# Patient Record
Sex: MALE | Race: WHITE | NOT HISPANIC OR LATINO | Employment: OTHER | URBAN - METROPOLITAN AREA
[De-identification: names, ages, dates, MRNs, and addresses within clinical notes are randomized per-mention and may not be internally consistent; named-entity substitution may affect disease eponyms.]

---

## 2017-12-19 ENCOUNTER — OFFICE VISIT (OUTPATIENT)
Dept: SURGERY | Facility: CLINIC | Age: 57
End: 2017-12-19
Payer: MEDICARE

## 2017-12-19 VITALS
HEIGHT: 67 IN | DIASTOLIC BLOOD PRESSURE: 89 MMHG | BODY MASS INDEX: 36.09 KG/M2 | SYSTOLIC BLOOD PRESSURE: 137 MMHG | WEIGHT: 229.94 LBS

## 2017-12-19 DIAGNOSIS — K40.90 LEFT INGUINAL HERNIA: ICD-10-CM

## 2017-12-19 DIAGNOSIS — K43.2 INCISIONAL HERNIA, WITHOUT OBSTRUCTION OR GANGRENE: Primary | ICD-10-CM

## 2017-12-19 PROCEDURE — 99203 OFFICE O/P NEW LOW 30 MIN: CPT | Mod: ,,, | Performed by: SURGERY

## 2017-12-19 RX ORDER — HYDROCHLOROTHIAZIDE 25 MG/1
TABLET ORAL DAILY
COMMUNITY
Start: 2017-09-12 | End: 2020-01-27 | Stop reason: SDUPTHER

## 2017-12-19 RX ORDER — TAMSULOSIN HYDROCHLORIDE 0.4 MG/1
CAPSULE ORAL
COMMUNITY
Start: 2017-10-06 | End: 2018-07-06 | Stop reason: SDUPTHER

## 2017-12-19 RX ORDER — CYCLOBENZAPRINE HCL 5 MG
5 TABLET ORAL 3 TIMES DAILY PRN
COMMUNITY
Start: 2017-09-12 | End: 2020-09-17 | Stop reason: SDUPTHER

## 2017-12-19 RX ORDER — FLUTICASONE PROPIONATE 50 MCG
1 SPRAY, SUSPENSION (ML) NASAL
COMMUNITY
Start: 2017-09-12 | End: 2021-02-15 | Stop reason: SDUPTHER

## 2017-12-19 RX ORDER — AMLODIPINE BESYLATE 10 MG/1
10 TABLET ORAL DAILY
COMMUNITY
Start: 2017-11-17 | End: 2020-01-30 | Stop reason: SDUPTHER

## 2017-12-19 RX ORDER — FINASTERIDE 5 MG/1
TABLET, FILM COATED ORAL
COMMUNITY
Start: 2017-12-06 | End: 2018-09-11

## 2017-12-19 RX ORDER — ALPRAZOLAM 1 MG/1
TABLET ORAL DAILY
COMMUNITY
Start: 2017-12-15 | End: 2019-11-15 | Stop reason: SDUPTHER

## 2017-12-19 NOTE — LETTER
December 21, 2017      Jonathan Chacon MD  [address]  [address]  [facility] [address]           [facility]  [address]  [address]  [facility] [address]  Phone: [phone]  Fax: [phone]          Patient: Patrick Wilson   MR Number: [id number]   YOB: 1960   Date of Visit: 12/19/2017       Dear Dr. Jonathan Chacon:    Thank you for referring Patrick Wilson to me for evaluation. Attached you will find relevant portions of my assessment and plan of care.    If you have questions, please do not hesitate to call me. I look forward to following Patrick Wilson along with you.    Sincerely,    Vladimir Multani III, MD    Enclosure  CC:  No Recipients    If you would like to receive this communication electronically, please contact [email or web address] or [phone] to request more information on [employer] Link access.    For providers and/or their staff who would like to refer a patient to [facility], please contact us through our one-stop-shop provider referral line, [facility], at [phone].    If you feel you have received this communication in error or would no longer like to receive these types of communications, please e-mail [email or web address]

## 2017-12-19 NOTE — PROGRESS NOTES
Subjective:       Patient ID: Patrick Wilson is a 57 y.o. male.    Chief Complaint: Other (Referred by Dr Chacon to Evaluate Bilateral Hernia)      HPI:  Patient presents to the office with recurrent incisional hernia and also left inguinal. He has had multiple back surgeries through what appears to be an anterior retroperitoneal approach.  All the surgeries performed at outside facility and records are not available. The incision is in the left lateral abdomen. He states he had hernia repaired at the incision several years ago. He noticed a pop with discomfort there several weeks ago. He now has a bulge in that area. During exam with primary care physician also found to have a left inguinal hernia. He has no obstructive symptoms.      Past Medical History:   Diagnosis Date    Anticoagulant long-term use     Anxiety     Arthritis     Cerebral palsy     Depression     GERD (gastroesophageal reflux disease)     Hypertension      Past Surgical History:   Procedure Laterality Date    BACK SURGERY      LEG SURGERY Left     SHOULDER SURGERY Right      Review of patient's allergies indicates:   Allergen Reactions    Tylox [oxycodone-acetaminophen] Nausea And Vomiting     Medication List with Changes/Refills   Current Medications    ALPRAZOLAM (XANAX) 1 MG TABLET        AMLODIPINE (NORVASC) 10 MG TABLET        ASPIRIN (ECOTRIN) 81 MG EC TABLET    Take 81 mg by mouth once daily.    ATORVASTATIN (LIPITOR) 20 MG TABLET    Take 20 mg by mouth once daily.    CYCLOBENZAPRINE (FLEXERIL) 5 MG TABLET        DULOXETINE (CYMBALTA) 60 MG CAPSULE    Take 60 mg by mouth once daily.    FINASTERIDE (PROSCAR) 5 MG TABLET        FLUTICASONE (FLONASE) 50 MCG/ACTUATION NASAL SPRAY        GABAPENTIN (NEURONTIN) 300 MG CAPSULE    Take 900 mg by mouth 3 (three) times daily.    HYDROCHLOROTHIAZIDE (HYDRODIURIL) 25 MG TABLET        HYDROCODONE-ACETAMINOPHEN 10-325MG (NORCO)  MG TAB    Take by mouth.    PANTOPRAZOLE  (PROTONIX) 40 MG TABLET    Take 40 mg by mouth once daily.    TAMSULOSIN (FLOMAX) 0.4 MG CP24       Discontinued Medications    ALPRAZOLAM (XANAX) 2 MG TAB    Take by mouth nightly as needed.     History reviewed. No pertinent family history.  Social History     Social History    Marital status:      Spouse name: N/A    Number of children: N/A    Years of education: N/A     Social History Main Topics    Smoking status: Current Some Day Smoker     Types: Cigars    Smokeless tobacco: None    Alcohol use Yes      Comment: occasional    Drug use: No    Sexual activity: Not Asked     Other Topics Concern    None     Social History Narrative    None         Review of Systems   Constitutional: Negative for appetite change, chills, fever and unexpected weight change.   HENT: Negative for hearing loss, rhinorrhea, sore throat and voice change.    Eyes: Negative for photophobia and visual disturbance.   Respiratory: Negative for cough, choking and shortness of breath.    Cardiovascular: Negative for chest pain, palpitations and leg swelling.   Gastrointestinal: Positive for abdominal pain and diarrhea. Negative for blood in stool, constipation, nausea and vomiting.   Endocrine: Negative for cold intolerance, heat intolerance and polyphagia.   Genitourinary: Negative for dysuria.   Musculoskeletal: Positive for back pain. Negative for arthralgias.   Skin: Negative for color change.   Neurological: Negative for dizziness, seizures, syncope and headaches.   Hematological: Negative for adenopathy. Does not bruise/bleed easily.       Objective:      Physical Exam   Constitutional: He is oriented to person, place, and time. He appears well-developed and well-nourished.  Non-toxic appearance. No distress.   HENT:   Head: Normocephalic and atraumatic. Head is without abrasion and without laceration.   Right Ear: External ear normal.   Left Ear: External ear normal.   Nose: Nose normal.   Mouth/Throat: Oropharynx is  clear and moist.   Eyes: EOM are normal. Pupils are equal, round, and reactive to light.   Neck: Trachea normal. Neck supple. No tracheal deviation and normal range of motion present.   Cardiovascular: Normal rate and regular rhythm.    Pulmonary/Chest: Effort normal. No accessory muscle usage. No tachypnea. No respiratory distress.   Abdominal: Soft. Normal appearance and bowel sounds are normal. He exhibits no distension and no mass. There is no hepatosplenomegaly. There is no tenderness. A hernia is present. Hernia confirmed positive in the ventral area and confirmed positive in the left inguinal area.       He has a long incision in the left lateral abdomen that appears to be a retroperitoneal approach incision. He has bulge with hernia coming through the incision. It is reducible.    Also has a reducible left inguinal hernia.     Lymphadenopathy:        Right: No inguinal adenopathy present.        Left: No inguinal adenopathy present.   Neurological: He is alert and oriented to person, place, and time. Coordination and gait normal.   Skin: Skin is warm and intact.   Psychiatric: He has a normal mood and affect. His speech is normal and behavior is normal.       Assessment/Plan:   Patrick was seen today for other.    Diagnoses and all orders for this visit:    Incisional hernia, without obstruction or gangrene  -     CT Abdomen Pelvis With Contrast; Future    Left inguinal hernia            Has a complicated recurrent incisional hernia through a retroperitoneal left lateral incision. Also has a left inguinal hernia. We'll order a CT scan to review the extent of the hernia. He will return to the office after CT scan to discuss possible surgical options.

## 2018-03-06 ENCOUNTER — OFFICE VISIT (OUTPATIENT)
Dept: SURGERY | Facility: CLINIC | Age: 58
End: 2018-03-06
Payer: MEDICARE

## 2018-03-06 VITALS
SYSTOLIC BLOOD PRESSURE: 137 MMHG | WEIGHT: 229 LBS | BODY MASS INDEX: 35.94 KG/M2 | DIASTOLIC BLOOD PRESSURE: 89 MMHG | HEIGHT: 67 IN

## 2018-03-06 DIAGNOSIS — K40.90 LEFT INGUINAL HERNIA: Primary | ICD-10-CM

## 2018-03-06 PROCEDURE — 99213 OFFICE O/P EST LOW 20 MIN: CPT | Mod: ,,, | Performed by: SURGERY

## 2018-03-06 NOTE — PROGRESS NOTES
Subjective:       Patient ID: Patrick Wilson is a 57 y.o. male.    Chief Complaint: Other (ReEval Incisional Hernia & Left Inguinal Hernia)      HPI:  Patient returns to the office after his CT scan to evaluate his abdominal wall.  CT showed no evidence of ventral incisional hernia recurrence. It did show the moderate size left inguinal hernia.  The left abdominal wall appeared more lax and was asymmetric but hernia was not identified.  He has no new complaints.  He was originally seen two months ago with concerns about a recurrent incisional hernia and also left inguinal. He has had multiple back surgeries through what appears to be an anterior retroperitoneal approach.  All the surgeries performed at outside facility and records are not available. The incision is in the left lateral abdomen. He states he had hernia repaired at the incision several years ago. He noticed a pop with discomfort there. He noticed that he has a bigger bulge in that area. During exam with primary care physician also found to have a left inguinal hernia. He has no obstructive symptoms.      Past Medical History:   Diagnosis Date    Anticoagulant long-term use     Anxiety     Arthritis     Cerebral palsy     Depression     GERD (gastroesophageal reflux disease)     Hypertension      Past Surgical History:   Procedure Laterality Date    BACK SURGERY      LEG SURGERY Left     SHOULDER SURGERY Right      Review of patient's allergies indicates:   Allergen Reactions    Tylox [oxycodone-acetaminophen] Nausea And Vomiting     Medication List with Changes/Refills   Current Medications    ALPRAZOLAM (XANAX) 1 MG TABLET        AMLODIPINE (NORVASC) 10 MG TABLET        ASPIRIN (ECOTRIN) 81 MG EC TABLET    Take 81 mg by mouth once daily.    ATORVASTATIN (LIPITOR) 20 MG TABLET    Take 20 mg by mouth once daily.    CYCLOBENZAPRINE (FLEXERIL) 5 MG TABLET        DULOXETINE (CYMBALTA) 60 MG CAPSULE    Take 60 mg by mouth once daily.     FINASTERIDE (PROSCAR) 5 MG TABLET        FLUTICASONE (FLONASE) 50 MCG/ACTUATION NASAL SPRAY        GABAPENTIN (NEURONTIN) 300 MG CAPSULE    Take 900 mg by mouth 3 (three) times daily.    HYDROCHLOROTHIAZIDE (HYDRODIURIL) 25 MG TABLET        HYDROCODONE-ACETAMINOPHEN 10-325MG (NORCO)  MG TAB    Take by mouth.    PANTOPRAZOLE (PROTONIX) 40 MG TABLET    Take 40 mg by mouth once daily.    TAMSULOSIN (FLOMAX) 0.4 MG CP24         History reviewed. No pertinent family history.  Social History     Social History    Marital status:      Spouse name: N/A    Number of children: N/A    Years of education: N/A     Social History Main Topics    Smoking status: Current Some Day Smoker     Types: Cigars    Smokeless tobacco: Never Used    Alcohol use Yes      Comment: occasional    Drug use: No    Sexual activity: Not Asked     Other Topics Concern    None     Social History Narrative    None         Review of Systems   Constitutional: Negative for appetite change, chills, fever and unexpected weight change.   HENT: Negative for hearing loss, rhinorrhea, sore throat and voice change.    Eyes: Negative for photophobia and visual disturbance.   Respiratory: Negative for cough, choking and shortness of breath.    Cardiovascular: Negative for chest pain, palpitations and leg swelling.   Gastrointestinal: Positive for abdominal pain and diarrhea. Negative for blood in stool, constipation, nausea and vomiting.   Endocrine: Negative for cold intolerance, heat intolerance and polyphagia.   Genitourinary: Negative for dysuria.   Musculoskeletal: Positive for back pain. Negative for arthralgias.   Skin: Negative for color change.   Neurological: Negative for dizziness, seizures, syncope and headaches.   Hematological: Negative for adenopathy. Does not bruise/bleed easily.       Objective:      Physical Exam   Constitutional: He is oriented to person, place, and time. He appears well-developed and well-nourished.   Non-toxic appearance. No distress.   HENT:   Head: Normocephalic and atraumatic. Head is without abrasion and without laceration.   Right Ear: External ear normal.   Left Ear: External ear normal.   Nose: Nose normal.   Mouth/Throat: Oropharynx is clear and moist.   Eyes: EOM are normal. Pupils are equal, round, and reactive to light.   Neck: Trachea normal. Neck supple. No tracheal deviation and normal range of motion present.   Cardiovascular: Normal rate and regular rhythm.    Pulmonary/Chest: Effort normal. No accessory muscle usage. No tachypnea. No respiratory distress.   Abdominal: Soft. Normal appearance and bowel sounds are normal. He exhibits no distension and no mass. There is no hepatosplenomegaly. There is no tenderness. A hernia ( ) is present. Hernia confirmed positive in the left inguinal area. Hernia confirmed negative in the ventral area.       He has a long incision in the left lateral abdomen that appears to be a retroperitoneal approach incision.  His abdomen is bulging on the left.  No hernia     reducible left inguinal hernia appreciated.       Lymphadenopathy:        Right: No inguinal adenopathy present.        Left: No inguinal adenopathy present.   Neurological: He is alert and oriented to person, place, and time. Coordination and gait normal.   Skin: Skin is warm and intact.   Psychiatric: He has a normal mood and affect. His speech is normal and behavior is normal.       Assessment/Plan:   Left inguinal hernia  -     Ambulatory Referral to External Surgery  -     CBC Without Differential; Future; Expected date: 03/06/2018  -     Basic metabolic panel; Future; Expected date: 03/06/2018  -     X-Ray Chest PA And Lateral  -     SCHEDULED EKG 12-LEAD (to Muse); Future    No evidence of recurrent incisional hernia.  He would liek the inguinal hernia repaired.  He will be scheduled for repair in one month.     Planned procedure: left inguinal hernia repair.      Ancef 2 gm IV on call to  OR    NPO past midnight    Stephen cloth scrub per protocol    SCD's Bilateral Lower Extremities    I discussed the proposed procedures the patient including risks, benefits, indications, alternatives and special concerns.  The patient appears to understand and agrees to go ahead with surgery.  I have made no promises, warranties or verbal agreements beyond what was discussed above.

## 2018-06-25 ENCOUNTER — OFFICE VISIT (OUTPATIENT)
Dept: UROLOGY | Facility: CLINIC | Age: 58
End: 2018-06-25
Payer: MEDICARE

## 2018-06-25 VITALS
BODY MASS INDEX: 34.37 KG/M2 | RESPIRATION RATE: 18 BRPM | SYSTOLIC BLOOD PRESSURE: 154 MMHG | DIASTOLIC BLOOD PRESSURE: 97 MMHG | HEART RATE: 103 BPM | HEIGHT: 67 IN | WEIGHT: 219 LBS

## 2018-06-25 DIAGNOSIS — R39.15 URINARY URGENCY: ICD-10-CM

## 2018-06-25 DIAGNOSIS — N13.8 BPH WITH OBSTRUCTION/LOWER URINARY TRACT SYMPTOMS: Primary | ICD-10-CM

## 2018-06-25 DIAGNOSIS — Z86.39 HISTORY OF HYPOGONADISM: ICD-10-CM

## 2018-06-25 DIAGNOSIS — N40.1 BPH WITH OBSTRUCTION/LOWER URINARY TRACT SYMPTOMS: Primary | ICD-10-CM

## 2018-06-25 PROCEDURE — 99999 PR PBB SHADOW E&M-EST. PATIENT-LVL IV: CPT | Mod: PBBFAC,,, | Performed by: UROLOGY

## 2018-06-25 PROCEDURE — 99204 OFFICE O/P NEW MOD 45 MIN: CPT | Mod: S$GLB,,, | Performed by: UROLOGY

## 2018-06-25 PROCEDURE — 3008F BODY MASS INDEX DOCD: CPT | Mod: CPTII,S$GLB,, | Performed by: UROLOGY

## 2018-06-25 RX ORDER — LIDOCAINE HYDROCHLORIDE 20 MG/ML
JELLY TOPICAL ONCE
Status: CANCELLED | OUTPATIENT
Start: 2018-06-25 | End: 2018-06-25

## 2018-06-25 NOTE — LETTER
June 26, 2018      Yaya Nick PA-C  [address]  [address]  [facility] [address]           [place] [facility]  [address] [place] [address] [address]  [place] [address]  Phone: [phone]  Fax: [phone]          Patient: Patrick Wilson   MR Number: [id number]   YOB: 1960   Date of Visit: 6/25/2018       Dear Yaya Nick:    Thank you for referring Patrick Wilson to me for evaluation. Attached you will find relevant portions of my assessment and plan of care.    If you have questions, please do not hesitate to call me. I look forward to following Patrick Wilson along with you.    Sincerely,    Jonathan Jacinto MD    Enclosure  CC:  Jonathan Chacon MD    If you would like to receive this communication electronically, please contact [email or web address] or [phone] to request more information on [facility] Link access.    For providers and/or their staff who would like to refer a patient to [facility], please contact us through our one-stop-shop provider referral line, [facility], at [phone].    If you feel you have received this communication in error or would no longer like to receive these types of communications, please e-mail [email or web address]

## 2018-06-25 NOTE — PATIENT INSTRUCTIONS
Discussed conservative measures to control urgency and frequency including   1. avoiding bladder irritants (see below) - especially in evening!    2. timed voiding - empty on a schedule (approx ~2 hours)    3. dont postponing voiding - dont hold it on purpose     4. bowel regimen as distended bowel has extrinsic compressive effect on bladder.   - any or all of the following in any combination, titrate to soft daily bowel movement without pushing or straining  - colace/stool softener capsule - once to twice daily  - miralax - 1 capful daily to start, can increase to 2x daily (or decrease to 1/2 cap daily)  - increase dietary fibery   - fiber supplements, such as metamucil    Discussed bladder irritants include coffe (even decaf), tea, alcohol, soda, spicy foods, acidic juices (orange, tomato), vinegar, and artificial sweeteners/sugary beverages.    Discontinue fluid intake 2 hours before bed.    Can try taking 2 flomax at once (2 capsules, 0.8mg at one time) until returning for cystoscopy      Cystoscopy    Cystoscopy is a procedure that lets your doctor look directly inside your urethra and bladder. It can be used to:  · Help diagnose a problem with your urethra, bladder, or kidneys.  · Take a sample (biopsy) of bladder or urethral tissue.  · Treat certain problems (such as removing kidney stones).  · Place a stent to bypass an obstruction.  · Take special X-rays of the kidneys.  Based on the findings, your doctor may recommend other tests or treatments.  What is a cystoscope?  A cystoscope is a telescope-like instrument that contains lenses and fiberoptics (small glass wires that make bright light). The cystoscope may be straight and rigid, or flexible to bend around curves in the urethra. The doctor may look directly into the cystoscope, or project the image onto a monitor.  Getting ready  · Ask your doctor if you should stop taking any medicines before the procedure.  · Ask whether you should avoid eating or  drinking anything after midnight before the procedure.  · Follow any other instructions your doctor gives you.  Tell your doctor before the exam if you:  · Take any medicines, such as aspirin or blood thinners  · Have allergies to any medicines  · Are pregnant   The procedure  Cystoscopy is done in the doctors office, surgery center, or hospital. The doctor and a nurse are present during the procedure. It takes only a few minutes, longer if a biopsy, X-ray, or treatment needs to be done.  During the procedure:  · You lie on an exam table on your back, knees bent and legs apart. You are covered with a drape.  · Your urethra and the area around it are washed. Anesthetic jelly may be applied to numb the urethra. Other pain medicine is usually not needed. In some cases, you may be offered a mild sedative to help you relax. If a more extensive procedure is to be done, such as a biopsy or kidney stone removal, general anesthesia may be needed.  · The cystoscope is inserted. A sterile fluid is put into the bladder to expand it. You may feel pressure from this fluid.  · When the procedure is done, the cystoscope is removed.  After the procedure  If you had a sedative, general anesthesia, or spinal anesthesia, you must have someone drive you home. Once youre home:  · Drink plenty of fluids.  · You may have burning or light bleeding when you urinate--this is normal.  · Medicines may be prescribed to ease any discomfort or prevent infection. Take these as directed.  · Call your doctor if you have heavy bleeding or blood clots, burning that lasts more than a day, a fever over 100°F  (38° C), or trouble urinating.  Date Last Reviewed: 1/1/2017 © 2000-2017 [employer]. [address] [place] [address], [place] [address]. All rights reserved. This information is not intended as a substitute for professional medical care. Always follow your healthcare professional's instructions.

## 2018-07-08 RX ORDER — TAMSULOSIN HYDROCHLORIDE 0.4 MG/1
0.8 CAPSULE ORAL DAILY
Qty: 180 CAPSULE | Refills: 3 | Status: SHIPPED | OUTPATIENT
Start: 2018-07-08 | End: 2018-09-11

## 2018-07-16 ENCOUNTER — TELEPHONE (OUTPATIENT)
Dept: UROLOGY | Facility: CLINIC | Age: 58
End: 2018-07-16

## 2018-07-16 NOTE — TELEPHONE ENCOUNTER
----- Message from Ambreen Carrillo sent at 7/16/2018 10:06 AM CDT -----  Contact: self [phone]  He said he has not received instructions for his procedure tomorrow.  Please call him.  Thank you!

## 2018-07-17 ENCOUNTER — SURGERY (OUTPATIENT)
Age: 58
End: 2018-07-17

## 2018-07-17 ENCOUNTER — HOSPITAL ENCOUNTER (OUTPATIENT)
Facility: AMBULARY SURGERY CENTER | Age: 58
Discharge: HOME | End: 2018-07-17
Attending: UROLOGY | Admitting: UROLOGY
Payer: MEDICARE

## 2018-07-17 DIAGNOSIS — N13.8 BPH WITH OBSTRUCTION/LOWER URINARY TRACT SYMPTOMS: ICD-10-CM

## 2018-07-17 DIAGNOSIS — N40.1 BPH WITH OBSTRUCTION/LOWER URINARY TRACT SYMPTOMS: ICD-10-CM

## 2018-07-17 PROCEDURE — 76872 US TRANSRECTAL: CPT | Performed by: UROLOGY

## 2018-07-17 PROCEDURE — 76872 US TRANSRECTAL: CPT | Mod: 26,,, | Performed by: UROLOGY

## 2018-07-17 PROCEDURE — 52000 CYSTOURETHROSCOPY: CPT | Mod: 59,,, | Performed by: UROLOGY

## 2018-07-17 PROCEDURE — 52000 CYSTOURETHROSCOPY: CPT | Performed by: UROLOGY

## 2018-07-17 RX ORDER — CIPROFLOXACIN 500 MG/1
500 TABLET ORAL 2 TIMES DAILY
Qty: 6 TABLET | Refills: 0 | Status: SHIPPED | OUTPATIENT
Start: 2018-07-17 | End: 2018-07-23

## 2018-07-17 RX ORDER — LIDOCAINE HYDROCHLORIDE 20 MG/ML
JELLY TOPICAL
Status: DISCONTINUED
Start: 2018-07-17 | End: 2018-07-17 | Stop reason: HOSPADM

## 2018-07-17 RX ORDER — LIDOCAINE HYDROCHLORIDE 20 MG/ML
JELLY TOPICAL ONCE
Status: COMPLETED | OUTPATIENT
Start: 2018-07-17 | End: 2018-07-17

## 2018-07-17 RX ORDER — WATER 1000 ML/1000ML
INJECTION, SOLUTION INTRAVENOUS
Status: DISCONTINUED | OUTPATIENT
Start: 2018-07-17 | End: 2018-07-17 | Stop reason: HOSPADM

## 2018-07-17 RX ADMIN — WATER 500 ML: 1000 INJECTION, SOLUTION INTRAVENOUS at 02:07

## 2018-07-17 RX ADMIN — LIDOCAINE HYDROCHLORIDE 5 ML: 20 JELLY TOPICAL at 02:07

## 2018-07-17 NOTE — INTERVAL H&P NOTE
The patient has been examined and the H&P has been reviewed:    No changes to above proceed with trus.cysto. Pt is acceptable candidate for procedure at [facility]     Anesthesia/Surgery risks, benefits and alternative options discussed and understood by patient/family.          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

## 2018-07-17 NOTE — H&P (VIEW-ONLY)
[facility] Urology Consult:  Consult from: Yaya CASTILLO  Consult for: BPH    Mr. Patrick Wilson is a 58-year-old male referred by ANNA Nick/ Dr Chacon for evaluation of BPH with LUTS    He was last seen by primary care on 05/03/2018 and noted to be on Flomax 0.4 mg daily and finasteride 5 mg daily without adequate symptomatic control on dual medical therapy.  Noted to have worsening nocturia 4-5 times.  He requested new urologist.  He was previously seeing Dr. Swanson.  He was noted to be a chronically ill individual, as he has chronic pain syndrome and had been doing better lately and was noted to be the best he had been seen in quite some time.  Takes chronic hydrocodone, Xanax, Cymbalta.  Did have lumbar spine surgery in 1995, and 2005 by anterior approach.  He also has a history of hypogonadism and testosterone replacement.  This has been complicated by polycythemia and on 12/19/2017 H/H were noted to be 58.6/19.1.  He was advised to go to the blood Center at that time and donate 1 unit of blood and reduce his testosterone dose to 1 cc every 2 weeks instead of 2 cc.  A repeat H/H on 04/17/2018 was noted to be 52.1/17.  Total testosterone 241 on 02/28/2018.  Unclear about timing of last injection.  Recently had LIH pending repair with Dr Lux May 2018.    Record review from Dr. Swanson indicates that as of March 8, 2017 his PSA was 0.8, and had a documented testosterone of 406 (10/3/16).  He was noted to be on testosterone injection therapy at that time, and again timing of injections to labs unclear.  He was noted at that visit to have a 40 g prostate with no nodules.  He took Cialis 20 mg dose with significant improvement in his ED.  This was prescribed.  February 2016:  BPH on Flomax, PSA 0.8 January 2016.  Free testosterone low at 19.  On testosterone 1.5 cc every 2 weeks.  September 2015:  Poor results with monthly testosterone injection, changed to 1.5 cc every 2 weeks.  BPH, still on Flomax,  "60% improvement.  ED:  Still with decreased libido, uses 100 mg Viagra.  May 2015:  Testosterone injection teaching.  2 cc IM given.  Trial of Rapaflo for BPH with 50-70% improvement.  Prescribed Flomax.  Given a trial of Cialis for ED.    PSA:  04/10/2015 1.0, 01/06/2016 0.8 (25% free), 4/11/16 0.9, 3/6/17 0.8.  Creatinine 0.98. H/H 17/54.  TSH 1.44 normal  Total testosterone:  162 04/10/2015, 512 06/11/15, 09/22/2015 114 (19 free), 172 04/11/2016, 332 06/22/2016, 406 10/03/2016  Scrotal ultrasound 05/20/2015 with 4.4 cm right epididymal head cystic mass:  Epididymal cyst versus spermatocele, less likely loculated hydrocele.  4.4 x 2.6 x 4.2 cm with increased through transmission of sound.    He reports today noting that at 54 got very depressed and tired all the time and his T went down to 156.  Reports no point in taking his testosterone as wasn't helping so hasnt taken it in one year  Having urgency with occ drop UUI. Urgency x 3 years.  AUA SS: 26/6 (5: frequency, urgency; 4: emptying, nocturia; 3: intermittency, weak stream; 2: straining) - meds helped only 1 of 10.  Urgent with all nighttime voids, with nocturia 3-4x, is sometimes drinking up until bedtime  Water throughout day, 2 beers per day and a highball or mimosa in the evening. Doesn't get "loaded, just relaxed"  Minimal tea and soda. ++ spicy foods. ++ alcohol a few drinks per day  2 cups of coffee per AM  Night constant stream, during day has intermittency.  With bowel movement will urinate before, then after BM will need to urinate again with dribbling.  Had easy brusing and stomach ulcers from daypro which he has stopped since endoscopy/colonoscopy.  Urinated right before leaving the house. Couldn't void on arrival with PVR 147cc.  Feeling of being able to urinate comes and goes.   Can drink 4-6 beers and not need to urinate, but once goes, goes every 5 minutes  No hematuria, dysuria    Past Medical History:   Diagnosis Date    Anticoagulant " long-term use     Anxiety     Arthritis     Cerebral palsy     Depression     GERD (gastroesophageal reflux disease)     Hypertension        Past Surgical History:   Procedure Laterality Date    BACK SURGERY      LEG SURGERY Left     SHOULDER SURGERY Right        History reviewed. No pertinent family history.    Social History     Social History    Marital status:      Spouse name: N/A    Number of children: N/A    Years of education: N/A     Occupational History    Not on file.     Social History Main Topics    Smoking status: Current Some Day Smoker     Types: Cigars, Pipe    Smokeless tobacco: Never Used      Comment: occasional.    Alcohol use Yes      Comment: occasional    Drug use: No    Sexual activity: Not on file     Other Topics Concern    Not on file     Social History Narrative    No narrative on file       Review of patient's allergies indicates:   Allergen Reactions    Tylox [oxycodone-acetaminophen] Nausea And Vomiting       Medications Reviewed: see MAR    ROS:    Constitutional: denies fevers, chills, night sweats, fatigue, malaise  Respiratory: negative for cough, shortness of breath, wheezing, dyspnea.  Cardiovascular: + for high blood pressure, negative for chest pain, varicose veins, ankle swelling, palpitations, syncope.  GI: negative for abdominal pain, heartburn, indigestion, nausea, vomiting, constipation, diarrhea, blood in stool.   Urology: as noted above in HPI  Endocrinology: negative for cold intolerance, excessive thirst, not feeling tired/sluggish, no heat intolerance.   Hematology/Lymph: negative for easy bleeding, easy bruising, swollen glands.  Musculoskeletal: negative for back pain, joint pain, joint swelling, neck pain.  Allergy-Immunology: negative for seasonal allergies, negative for unusual infections.   Skin: negative for boils, breast lumps, hives, itching, rash.   Neurology: negative for, dizziness, headache, tingling/numbness, tremors.  "  Psych: satisfied with life; negative for, anxiety, depression, suicidal thoughts.     PHYSICAL EXAM:    Vitals:    06/25/18 0858   BP: (!) 154/97   Pulse: 103   Resp: 18     Body mass index is 34.3 kg/m². Weight: 99.3 kg (219 lb) Height: 5' 7" (170.2 cm)       General: Alert, cooperative, no distress, appears stated age  Head: Normocephalic, without obvious abnormality, atraumatic  Neck: no masses, no thyromegaly, no lymphadenopathy  Eyes: PERRL, conjunctiva/corneas clear  Lungs: Respirations unlabored, normal effort, no accessory muscle use  CV: Warm and well perfused extremities  Abdomen: Soft, non-tender, no CVA tenderness, no hepatosplenomegaly, 1+ pannus, LLQ scar, + LIH reducible  Back: surgical scar midline lumbar with central dimple within surgical scar  Penis: phallus normal, circumcised, well cared for, no plaques or lesions.   Scrotum: no cysts, no lesions, no rash, no hydrocele.   Epididymes: normal, nontender, symmetrical, large R epdidymal cyst  Testes: normal, both descended, no masses.   Urethra: palpably normal with orthotopic meatus of normal size    AMBER: normal sphincter tone, no masses, no hemmorrhoids   PROSTATE: 30g, no nodules, non-tender, symmetrical.   Extremities: Extremities normal, atraumatic, no cyanosis or edema  Skin: Normal color, texture, and turgor, no rashes or lesions  Psych: Appropriate, well oriented, normal affect, normal mood  Neuro: Non-focal      Assessment/Diagnosis:    1. BPH with obstruction/lower urinary tract symptoms  Case Request Operating Room: CYSTOSCOPY, ULTRASOUND, TRANSRECTAL    Place in Outpatient   2. Urinary urgency     3. History of hypogonadism         Plans:  Discussed conservative measures to control urgency and frequency including avoiding bladder irritants, timed voiding, not postponing voiding, and bowel regimen (as distended bowel has extrinsic compressive effect on bladder. Discussed bladder irritants include coffe (even decaf), tea, alcohol, soda, " spicy foods, acidic juices (orange, tomato), vinegar, and artificial sweeteners.  Since he has mildly enlarged prostate with significant obstructive symptoms, in addition to conservative measures as above urgency, we also discussed discontinuing fluid intake 2 hr before bed to help control nocturia.  As his symptoms are persistent despite alpha blockers, we did discuss formal lower urinary tract evaluation for obstruction to help guide further recommendations.  We discussed cystourethroscopy to evaluate for prostatic obstruction or any other lower urinary tract obstruction, with concurrent transrectal ultrasound guided accurate volumetric measurement of the prostate.  He would be interested in minimally invasive BPH therapies to help discontinue medication, though in the interim until evaluation he should continue Flomax at 0.8 mg dose.  Cystoscopy and trus 7/17/18   May need anticholinergic vs uds if no sig obstruction present  All questions patient had were answered.  He was agreeable to treatment plan.

## 2018-07-17 NOTE — DISCHARGE INSTRUCTIONS
Prostate Ultrasound    An ultrasound is a type of imaging test. It can be used to look at the prostate. Its a safe procedure that does not use radiation. It uses high-frequency sound waves.  When ultrasound is used  You may need ultrasound on your prostate if your health care provider thinks you may have prostate cancer. An ultrasound is most often done after a digital rectal exam (AMBER) or a PSA blood test. These are screening tests for prostate cancer.  How ultrasound helps  Ultrasound uses sound waves to create an image of the prostate gland. It helps your health care provider see if the gland looks abnormal. It can also be used to help guide a biopsy. This is a procedure that removes tiny pieces of tissue to test. A prostate biopsy is done with a needle. Ultrasound helps your health care provider see where to put the needle.  Before the procedure  You may be told to use an enema or suppository the night or morning before. This is to clear your rectum of stool. The test is often done in your health care provider's office. It usually takes less than 15 minutes. If a biopsy may be done, youll be given antibiotics before and after the test.  During the procedure  What happens during the procedure:  · You lie on your side with your knees bent or with your feet in stirrups.   · A disposable cover is put on the ultrasound probe. The probe is tube-shaped and a bit larger than a thumb. A jelly is put on the probe to lubricate it.  · Your health care provider gently inserts the probe into your rectum. This may cause some discomfort.  · The probe emits sound waves. These create an image of your prostate on a computer screen. Your health care provider looks at the image. The size and shape of your prostate are checked for problems.  · When the test is done, the probe is removed.  · You can go home the same day, and go back to your normal activities.  Date Last Reviewed: 3/24/2015  © 9330-1700 [employer]  [address] [place] [address]. All rights reserved. This information is not intended as a substitute for professional medical care. Always follow your healthcare professional's instructions.        Cystoscopy    Cystoscopy is a procedure that lets your doctor look directly inside your urethra and bladder. It can be used to:  · Help diagnose a problem with your urethra, bladder, or kidneys.  · Take a sample (biopsy) of bladder or urethral tissue.  · Treat certain problems (such as removing kidney stones).  · Place a stent to bypass an obstruction.  · Take special X-rays of the kidneys.  Based on the findings, your doctor may recommend other tests or treatments.  What is a cystoscope?  A cystoscope is a telescope-like instrument that contains lenses and fiberoptics (small glass wires that make bright light). The cystoscope may be straight and rigid, or flexible to bend around curves in the urethra. The doctor may look directly into the cystoscope, or project the image onto a monitor.  Getting ready  · Ask your doctor if you should stop taking any medicines before the procedure.  · Ask whether you should avoid eating or drinking anything after midnight before the procedure.  · Follow any other instructions your doctor gives you.  Tell your doctor before the exam if you:  · Take any medicines, such as aspirin or blood thinners  · Have allergies to any medicines  · Are pregnant   The procedure  Cystoscopy is done in the doctors office, surgery center, or hospital. The doctor and a nurse are present during the procedure. It takes only a few minutes, longer if a biopsy, X-ray, or treatment needs to be done.  During the procedure:  · You lie on an exam table on your back, knees bent and legs apart. You are covered with a drape.  · Your urethra and the area around it are washed. Anesthetic jelly may be applied to numb the urethra. Other pain medicine is usually not needed. In some cases, you may be offered a  mild sedative to help you relax. If a more extensive procedure is to be done, such as a biopsy or kidney stone removal, general anesthesia may be needed.  · The cystoscope is inserted. A sterile fluid is put into the bladder to expand it. You may feel pressure from this fluid.  · When the procedure is done, the cystoscope is removed.  After the procedure  If you had a sedative, general anesthesia, or spinal anesthesia, you must have someone drive you home. Once youre home:  · Drink plenty of fluids.  · You may have burning or light bleeding when you urinate--this is normal.  · Medicines may be prescribed to ease any discomfort or prevent infection. Take these as directed.  · Call your doctor if you have heavy bleeding or blood clots, burning that lasts more than a day, a fever over 100°F  (38° C), or trouble urinating.  Date Last Reviewed: 1/1/2017  © 5171-0940 [employer]. [address] [place] [address]. All rights reserved. This information is not intended as a substitute for professional medical care. Always follow your healthcare professional's instructions.               After the procedure    · Drink plenty of fluids.  · You may have burning or light bleeding when you urinate--this is normal.  · Medications may be prescribed to ease any discomfort or prevent infection. Take these as directed.  · Call your doctor if you have heavy bleeding or blood clots, burning that lasts more than a day, a fever over 100°F  (38° C), or trouble urinating.

## 2018-07-17 NOTE — OP NOTE
[facility] U[facility] Operative Report/Brief Discharge Note     Date: 7/17/18     Staff Surgeon: Jonathan Jacinto MD     Pre-Op Diagnosis:   BPH with LUTS and incomplete emptying.     Post-Op Diagnosis: same     Procedure(s) Performed:   1. Cystoscopy, flexible  2. Transrectal ultrasound measurement of prostate volume     Specimen(s): none     Anesthesia: Local: 2% xylocaine jelly per urethra (urojet)     Findings:   US: Prostate volume 28.2cm3 (W 45.2mm, H 26.5mm, L 45.1mm), no median lobe  Cysto: Bilateral lateral lobe ingrowth causing significant obstruction especially when viewed from verumontanum. No median lobe, but on retroflexion can see ingrowth from lateral lobes encroaching       Estimated Blood Loss: none     Drains: none     Complications: none      Indications for procedure: Mr Wilson is a 59 yo M with longstanding history of BPH/LUTS refractory to alpha blockers. AUA SS 26/5. PVR 147cc. Here for lower tract evaluation to discuss options further     Procedure in detail:  After informed consent, the patient was first placed in left lateral position.    US probe inserted into rectum and prostate visualized on screen. No ultrasound abnormalities of the prostate. Measurements taken as above with total volume of 28.2cc. No SV dilation or other ultrasonic abnormality.     Ultrasound probe removed and he was then placed in  supine position and prepped and drapped in standard cystoscopic fashion and 2% xylocaine jelly was instilled into the urethra. A flexible cystoscope was passed into the bladder via the urethra.      Anterior urethra appeared normal without any lesions or narrowings. The prostatic urethra demonstrated significant obstructing ingrowth of bilateral lateral lobes with visual obstruction, especially when viewed from verumontanum, as well as centrally.     Bladder otherwise systematically inspected and no mucosal lesions or tumors seen. He did have some grade 1 trabeculations. Bilateral ureteral  orifices seen in orthotopic position on trigone bilaterally with clear efflux. On retroflexion, no median lobe,  but can see ingrowth from lateral lobes encroaching         Patient tolerated the procedure well. No complications     Disposition:  We did discuss options for his obstructing prostate despite medical management.  As his prostate is < 40g, dual medical therapy not indicated, and he has failed alpha blocker even at 0.8mg dose of flomax. Given his desire to be free of medical management and have symptomatic relief, also discussed options for intervention such as Urolift vs TURP. After extensive discussion of both procedures and medical therapy, he elected to proceed with Urolift.      Procedure in detail discussed, including risks benefits and alternatives (such as TURP and continued medical management). Discussed risks including but not limited to hematuria, postop retention, pain, infection, injury to bladder or urethra, and worsening urgency, latent pelvic pain, no guarantee of symptomatic relief, prostate regrowth, need for future procedures. We did discuss the non-incidence of ejaculatory dysfunction, as well as the 1-2% retreatment rate in 5 year studies. Also discussed about 20% of people may need a catheter after (due to retention or hematuria) but not required if voiding postop. All questions answered, and appropriate informed consent obtained.      UroLift scheduled in OR for 8/2/18.  Will need PAT visit prior at least 2 week before for updated labs, Ucx.   In the interim, he will continue flomax        Discharge home today status post uncomplicated procedure as above  Diet - resume home diet  Follow up: 8/2/18 for UroLift  Instructions: drink plenty of water, may see blood in urine, take abx as directed  Meds:     Medication List      START taking these medications    ciprofloxacin HCl 500 MG tablet  Commonly known as:  CIPRO  Take 1 tablet (500 mg total) by mouth 2 (two) times daily.         CONTINUE taking these medications    ALPRAZolam 1 MG tablet  Commonly known as:  XANAX     amLODIPine 10 MG tablet  Commonly known as:  NORVASC     aspirin 81 MG EC tablet  Commonly known as:  ECOTRIN     atorvastatin 20 MG tablet  Commonly known as:  LIPITOR     cyclobenzaprine 5 MG tablet  Commonly known as:  FLEXERIL     DULoxetine 60 MG capsule  Commonly known as:  CYMBALTA     finasteride 5 mg tablet  Commonly known as:  PROSCAR     fluticasone 50 mcg/actuation nasal spray  Commonly known as:  FLONASE     gabapentin 300 MG capsule  Commonly known as:  NEURONTIN     hydroCHLOROthiazide 25 MG tablet  Commonly known as:  HYDRODIURIL     HYDROcodone-acetaminophen  mg per tablet  Commonly known as:  NORCO     pantoprazole 40 MG tablet  Commonly known as:  PROTONIX     tamsulosin 0.4 mg Cap  Commonly known as:  FLOMAX  Take 2 capsules (0.8 mg total) by mouth once daily.           Where to Get Your Medications      These medications were sent to [id number] - [place], [place] - [address] [address]    Phone:  [phone]   · ciprofloxacin HCl 500 MG tablet

## 2018-07-18 VITALS
WEIGHT: 218.94 LBS | HEART RATE: 81 BPM | HEIGHT: 67 IN | BODY MASS INDEX: 34.36 KG/M2 | SYSTOLIC BLOOD PRESSURE: 131 MMHG | OXYGEN SATURATION: 95 % | RESPIRATION RATE: 20 BRPM | TEMPERATURE: 99 F | DIASTOLIC BLOOD PRESSURE: 91 MMHG

## 2018-07-19 DIAGNOSIS — N13.8 BPH WITH URINARY OBSTRUCTION: Primary | ICD-10-CM

## 2018-07-19 DIAGNOSIS — N40.1 BPH WITH URINARY OBSTRUCTION: Primary | ICD-10-CM

## 2018-07-19 DIAGNOSIS — R58 BLEEDING: ICD-10-CM

## 2018-07-20 PROCEDURE — 93010 ELECTROCARDIOGRAM REPORT: CPT | Mod: ,,, | Performed by: INTERNAL MEDICINE

## 2018-07-20 PROCEDURE — 93005 ELECTROCARDIOGRAM TRACING: CPT

## 2018-07-23 ENCOUNTER — HOSPITAL ENCOUNTER (OUTPATIENT)
Dept: RADIOLOGY | Facility: HOSPITAL | Age: 58
Discharge: HOME | End: 2018-07-23
Attending: UROLOGY
Payer: MEDICARE

## 2018-07-23 ENCOUNTER — HOSPITAL ENCOUNTER (OUTPATIENT)
Dept: PREADMISSION TESTING | Facility: HOSPITAL | Age: 58
Discharge: HOME | End: 2018-07-23
Attending: UROLOGY
Payer: MEDICARE

## 2018-07-23 VITALS — BODY MASS INDEX: 34.37 KG/M2 | HEIGHT: 67 IN | WEIGHT: 219 LBS

## 2018-07-23 DIAGNOSIS — N13.8 BPH WITH URINARY OBSTRUCTION: ICD-10-CM

## 2018-07-23 DIAGNOSIS — N40.1 BPH WITH URINARY OBSTRUCTION: ICD-10-CM

## 2018-07-23 PROCEDURE — 71046 X-RAY EXAM CHEST 2 VIEWS: CPT | Mod: TC,FY

## 2018-07-23 PROCEDURE — 71046 X-RAY EXAM CHEST 2 VIEWS: CPT | Mod: 26,,, | Performed by: RADIOLOGY

## 2018-07-23 PROCEDURE — 99900103 DSU ONLY-NO CHARGE-INITIAL HR (STAT)

## 2018-07-23 PROCEDURE — 99900104 DSU ONLY-NO CHARGE-EA ADD'L HR (STAT)

## 2018-08-01 ENCOUNTER — ANESTHESIA EVENT (OUTPATIENT)
Dept: SURGERY | Facility: HOSPITAL | Age: 58
End: 2018-08-01
Payer: MEDICARE

## 2018-08-02 ENCOUNTER — HOSPITAL ENCOUNTER (OUTPATIENT)
Facility: HOSPITAL | Age: 58
Discharge: HOME | End: 2018-08-02
Attending: UROLOGY | Admitting: UROLOGY
Payer: MEDICARE

## 2018-08-02 ENCOUNTER — SURGERY (OUTPATIENT)
Age: 58
End: 2018-08-02

## 2018-08-02 ENCOUNTER — ANESTHESIA (OUTPATIENT)
Dept: SURGERY | Facility: HOSPITAL | Age: 58
End: 2018-08-02
Payer: MEDICARE

## 2018-08-02 VITALS
TEMPERATURE: 98 F | HEART RATE: 86 BPM | OXYGEN SATURATION: 96 % | HEIGHT: 67 IN | WEIGHT: 233 LBS | BODY MASS INDEX: 36.57 KG/M2 | RESPIRATION RATE: 18 BRPM | DIASTOLIC BLOOD PRESSURE: 88 MMHG | SYSTOLIC BLOOD PRESSURE: 128 MMHG

## 2018-08-02 DIAGNOSIS — N40.1 BPH WITH URINARY OBSTRUCTION: ICD-10-CM

## 2018-08-02 DIAGNOSIS — N13.8 BPH WITH URINARY OBSTRUCTION: ICD-10-CM

## 2018-08-02 PROCEDURE — D9220A PRA ANESTHESIA: Mod: CRNA,,, | Performed by: NURSE ANESTHETIST, CERTIFIED REGISTERED

## 2018-08-02 PROCEDURE — 99900104 DSU ONLY-NO CHARGE-EA ADD'L HR (STAT): Performed by: UROLOGY

## 2018-08-02 PROCEDURE — 52442 CYSTO INS TRNSPRSTC IMPLT EA: CPT | Mod: ,,, | Performed by: UROLOGY

## 2018-08-02 PROCEDURE — 36000706: Performed by: UROLOGY

## 2018-08-02 PROCEDURE — 25000003 PHARM REV CODE 250: Performed by: UROLOGY

## 2018-08-02 PROCEDURE — 52441 CYSTO INSJ TRNSPRSTC 1 IMPLT: CPT | Mod: ,,, | Performed by: UROLOGY

## 2018-08-02 PROCEDURE — 71000033 HC RECOVERY, INTIAL HOUR: Performed by: UROLOGY

## 2018-08-02 PROCEDURE — D9220A PRA ANESTHESIA: Mod: ANES,,, | Performed by: ANESTHESIOLOGY

## 2018-08-02 PROCEDURE — 63600175 PHARM REV CODE 636 W HCPCS: Performed by: ANESTHESIOLOGY

## 2018-08-02 PROCEDURE — 71000039 HC RECOVERY, EACH ADD'L HOUR: Performed by: UROLOGY

## 2018-08-02 PROCEDURE — L8699 PROSTHETIC IMPLANT NOS: HCPCS | Performed by: UROLOGY

## 2018-08-02 PROCEDURE — 27200651 HC AIRWAY, LMA: Performed by: NURSE ANESTHETIST, CERTIFIED REGISTERED

## 2018-08-02 PROCEDURE — 63600175 PHARM REV CODE 636 W HCPCS: Performed by: NURSE ANESTHETIST, CERTIFIED REGISTERED

## 2018-08-02 PROCEDURE — 63600175 PHARM REV CODE 636 W HCPCS: Performed by: UROLOGY

## 2018-08-02 PROCEDURE — 25000003 PHARM REV CODE 250: Performed by: ANESTHESIOLOGY

## 2018-08-02 PROCEDURE — 71000015 HC POSTOP RECOV 1ST HR: Performed by: UROLOGY

## 2018-08-02 PROCEDURE — 99900103 DSU ONLY-NO CHARGE-INITIAL HR (STAT): Performed by: UROLOGY

## 2018-08-02 PROCEDURE — 37000008 HC ANESTHESIA 1ST 15 MINUTES: Performed by: UROLOGY

## 2018-08-02 PROCEDURE — 37000009 HC ANESTHESIA EA ADD 15 MINS: Performed by: UROLOGY

## 2018-08-02 PROCEDURE — 36000707: Performed by: UROLOGY

## 2018-08-02 DEVICE — SYS UROLIFT: Type: IMPLANTABLE DEVICE | Site: PROSTATE | Status: FUNCTIONAL

## 2018-08-02 RX ORDER — SODIUM CHLORIDE, SODIUM LACTATE, POTASSIUM CHLORIDE, CALCIUM CHLORIDE 600; 310; 30; 20 MG/100ML; MG/100ML; MG/100ML; MG/100ML
INJECTION, SOLUTION INTRAVENOUS CONTINUOUS
Status: DISCONTINUED | OUTPATIENT
Start: 2018-08-02 | End: 2018-08-02 | Stop reason: HOSPADM

## 2018-08-02 RX ORDER — HYDROMORPHONE HYDROCHLORIDE 1 MG/ML
0.2 INJECTION, SOLUTION INTRAMUSCULAR; INTRAVENOUS; SUBCUTANEOUS EVERY 5 MIN PRN
Status: DISCONTINUED | OUTPATIENT
Start: 2018-08-02 | End: 2018-08-02 | Stop reason: HOSPADM

## 2018-08-02 RX ORDER — LIDOCAINE HCL/PF 100 MG/5ML
SYRINGE (ML) INTRAVENOUS
Status: DISCONTINUED | OUTPATIENT
Start: 2018-08-02 | End: 2018-08-02

## 2018-08-02 RX ORDER — MEPERIDINE HYDROCHLORIDE 25 MG/ML
12.5 INJECTION INTRAMUSCULAR; INTRAVENOUS; SUBCUTANEOUS ONCE AS NEEDED
Status: DISCONTINUED | OUTPATIENT
Start: 2018-08-02 | End: 2018-08-02 | Stop reason: HOSPADM

## 2018-08-02 RX ORDER — PHENAZOPYRIDINE HYDROCHLORIDE 200 MG/1
200 TABLET, FILM COATED ORAL ONCE AS NEEDED
Status: COMPLETED | OUTPATIENT
Start: 2018-08-02 | End: 2018-08-02

## 2018-08-02 RX ORDER — CEFAZOLIN SODIUM 2 G/50ML
2 SOLUTION INTRAVENOUS
Status: COMPLETED | OUTPATIENT
Start: 2018-08-02 | End: 2018-08-02

## 2018-08-02 RX ORDER — SODIUM CHLORIDE 0.9 % (FLUSH) 0.9 %
3 SYRINGE (ML) INJECTION
Status: DISCONTINUED | OUTPATIENT
Start: 2018-08-02 | End: 2018-08-02 | Stop reason: HOSPADM

## 2018-08-02 RX ORDER — MIDAZOLAM HYDROCHLORIDE 1 MG/ML
INJECTION, SOLUTION INTRAMUSCULAR; INTRAVENOUS
Status: DISCONTINUED | OUTPATIENT
Start: 2018-08-02 | End: 2018-08-02

## 2018-08-02 RX ORDER — SULFAMETHOXAZOLE AND TRIMETHOPRIM 800; 160 MG/1; MG/1
1 TABLET ORAL 2 TIMES DAILY
Qty: 10 TABLET | Refills: 0 | Status: SHIPPED | OUTPATIENT
Start: 2018-08-02 | End: 2018-08-07

## 2018-08-02 RX ORDER — ONDANSETRON 2 MG/ML
4 INJECTION INTRAMUSCULAR; INTRAVENOUS ONCE
Status: DISCONTINUED | OUTPATIENT
Start: 2018-08-02 | End: 2018-08-02 | Stop reason: HOSPADM

## 2018-08-02 RX ORDER — KETOROLAC TROMETHAMINE 10 MG/1
10 TABLET, FILM COATED ORAL EVERY 6 HOURS PRN
Qty: 15 TABLET | Refills: 0 | Status: SHIPPED | OUTPATIENT
Start: 2018-08-02 | End: 2018-09-11

## 2018-08-02 RX ORDER — LIDOCAINE HYDROCHLORIDE 10 MG/ML
1 INJECTION, SOLUTION EPIDURAL; INFILTRATION; INTRACAUDAL; PERINEURAL ONCE
Status: DISCONTINUED | OUTPATIENT
Start: 2018-08-02 | End: 2018-08-02 | Stop reason: HOSPADM

## 2018-08-02 RX ORDER — PROPOFOL 10 MG/ML
VIAL (ML) INTRAVENOUS
Status: DISCONTINUED | OUTPATIENT
Start: 2018-08-02 | End: 2018-08-02

## 2018-08-02 RX ORDER — SODIUM CHLORIDE, SODIUM LACTATE, POTASSIUM CHLORIDE, CALCIUM CHLORIDE 600; 310; 30; 20 MG/100ML; MG/100ML; MG/100ML; MG/100ML
75 INJECTION, SOLUTION INTRAVENOUS CONTINUOUS
Status: DISCONTINUED | OUTPATIENT
Start: 2018-08-02 | End: 2018-08-02 | Stop reason: HOSPADM

## 2018-08-02 RX ORDER — FENTANYL CITRATE 50 UG/ML
INJECTION, SOLUTION INTRAMUSCULAR; INTRAVENOUS
Status: DISCONTINUED | OUTPATIENT
Start: 2018-08-02 | End: 2018-08-02

## 2018-08-02 RX ORDER — PHENAZOPYRIDINE HYDROCHLORIDE 200 MG/1
200 TABLET, FILM COATED ORAL 3 TIMES DAILY PRN
Qty: 15 TABLET | Refills: 0 | Status: SHIPPED | OUTPATIENT
Start: 2018-08-02 | End: 2018-08-12

## 2018-08-02 RX ORDER — KETOROLAC TROMETHAMINE 30 MG/ML
30 INJECTION, SOLUTION INTRAMUSCULAR; INTRAVENOUS ONCE
Status: COMPLETED | OUTPATIENT
Start: 2018-08-02 | End: 2018-08-02

## 2018-08-02 RX ORDER — TRAMADOL HYDROCHLORIDE 50 MG/1
50 TABLET ORAL EVERY 6 HOURS PRN
Qty: 10 TABLET | Refills: 0 | Status: SHIPPED | OUTPATIENT
Start: 2018-08-02 | End: 2018-08-12

## 2018-08-02 RX ORDER — DIPHENHYDRAMINE HYDROCHLORIDE 50 MG/ML
25 INJECTION INTRAMUSCULAR; INTRAVENOUS EVERY 6 HOURS PRN
Status: DISCONTINUED | OUTPATIENT
Start: 2018-08-02 | End: 2018-08-02 | Stop reason: HOSPADM

## 2018-08-02 RX ORDER — OXYCODONE HYDROCHLORIDE 5 MG/1
5 TABLET ORAL
Status: DISCONTINUED | OUTPATIENT
Start: 2018-08-02 | End: 2018-08-02 | Stop reason: HOSPADM

## 2018-08-02 RX ORDER — FENTANYL CITRATE 50 UG/ML
25 INJECTION, SOLUTION INTRAMUSCULAR; INTRAVENOUS EVERY 5 MIN PRN
Status: DISCONTINUED | OUTPATIENT
Start: 2018-08-02 | End: 2018-08-02 | Stop reason: HOSPADM

## 2018-08-02 RX ADMIN — FENTANYL CITRATE 25 MCG: 50 INJECTION INTRAMUSCULAR; INTRAVENOUS at 10:08

## 2018-08-02 RX ADMIN — KETOROLAC TROMETHAMINE 30 MG: 30 INJECTION, SOLUTION INTRAMUSCULAR at 10:08

## 2018-08-02 RX ADMIN — CEFAZOLIN SODIUM 2 G: 2 SOLUTION INTRAVENOUS at 08:08

## 2018-08-02 RX ADMIN — SODIUM CHLORIDE, SODIUM LACTATE, POTASSIUM CHLORIDE, AND CALCIUM CHLORIDE: .6; .31; .03; .02 INJECTION, SOLUTION INTRAVENOUS at 07:08

## 2018-08-02 RX ADMIN — PROPOFOL 150 MG: 10 INJECTION, EMULSION INTRAVENOUS at 08:08

## 2018-08-02 RX ADMIN — LIDOCAINE HYDROCHLORIDE 100 MG: 20 INJECTION, SOLUTION INTRAVENOUS at 08:08

## 2018-08-02 RX ADMIN — FENTANYL CITRATE 25 MCG: 50 INJECTION INTRAMUSCULAR; INTRAVENOUS at 09:08

## 2018-08-02 RX ADMIN — FENTANYL CITRATE 100 MCG: 50 INJECTION, SOLUTION INTRAMUSCULAR; INTRAVENOUS at 08:08

## 2018-08-02 RX ADMIN — PHENAZOPYRIDINE HYDROCHLORIDE 200 MG: 200 TABLET ORAL at 09:08

## 2018-08-02 RX ADMIN — MIDAZOLAM 2 MG: 1 INJECTION INTRAMUSCULAR; INTRAVENOUS at 08:08

## 2018-08-02 NOTE — TRANSFER OF CARE
"Anesthesia Transfer of Care Note    Patient: Patrick Wilson    Procedure(s) Performed: Procedure(s) (LRB):  CYSTOSCOPY, WITH INSERTION OF UROLIFT IMPLANT (N/A)    Patient location: PACU    Anesthesia Type: general    Transport from OR: Transported from OR on 2-3 L/min O2 by NC with adequate spontaneous ventilation    Post pain: adequate analgesia    Post assessment: no apparent anesthetic complications    Post vital signs: stable    Level of consciousness: awake    Nausea/Vomiting: no nausea/vomiting    Complications: none    Transfer of care protocol was followed      Last vitals:   Visit Vitals  BP (!) 159/78 (BP Location: Left arm, Patient Position: Lying)   Pulse 90   Temp 36.8 °C (98.2 °F) (Skin)   Resp 14   Ht 5' 7" (1.702 m)   Wt 105.7 kg (233 lb)   SpO2 95%   BMI 36.49 kg/m²     "

## 2018-08-02 NOTE — H&P
[facility] Urology Consult:  Consult from: Yaya CASTILLO  Consult for: BPH     Mr. Patrick Wilson is a 58-year-old male referred by ANNA Nick/ Dr Chacon for evaluation of BPH with LUTS     He was last seen by primary care on 05/03/2018 and noted to be on Flomax 0.4 mg daily and finasteride 5 mg daily without adequate symptomatic control on dual medical therapy.  Noted to have worsening nocturia 4-5 times.  He requested new urologist.  He was previously seeing Dr. Swanson.  He was noted to be a chronically ill individual, as he has chronic pain syndrome and had been doing better lately and was noted to be the best he had been seen in quite some time.  Takes chronic hydrocodone, Xanax, Cymbalta.  Did have lumbar spine surgery in 1995, and 2005 by anterior approach.  He also has a history of hypogonadism and testosterone replacement.  This has been complicated by polycythemia and on 12/19/2017 H/H were noted to be 58.6/19.1.  He was advised to go to the blood Center at that time and donate 1 unit of blood and reduce his testosterone dose to 1 cc every 2 weeks instead of 2 cc.  A repeat H/H on 04/17/2018 was noted to be 52.1/17.  Total testosterone 241 on 02/28/2018.  Unclear about timing of last injection.  Recently had LIH pending repair with Dr Lux May 2018.     Record review from Dr. Swanson indicates that as of March 8, 2017 his PSA was 0.8, and had a documented testosterone of 406 (10/3/16).  He was noted to be on testosterone injection therapy at that time, and again timing of injections to labs unclear.  He was noted at that visit to have a 40 g prostate with no nodules.  He took Cialis 20 mg dose with significant improvement in his ED.  This was prescribed.  February 2016:  BPH on Flomax, PSA 0.8 January 2016.  Free testosterone low at 19.  On testosterone 1.5 cc every 2 weeks.  September 2015:  Poor results with monthly testosterone injection, changed to 1.5 cc every 2 weeks.  BPH, still on  "Flomax, 60% improvement.  ED:  Still with decreased libido, uses 100 mg Viagra.  May 2015:  Testosterone injection teaching.  2 cc IM given.  Trial of Rapaflo for BPH with 50-70% improvement.  Prescribed Flomax.  Given a trial of Cialis for ED.     PSA:  04/10/2015 1.0, 01/06/2016 0.8 (25% free), 4/11/16 0.9, 3/6/17 0.8.  Creatinine 0.98. H/H 17/54.  TSH 1.44 normal  Total testosterone:  162 04/10/2015, 512 06/11/15, 09/22/2015 114 (19 free), 172 04/11/2016, 332 06/22/2016, 406 10/03/2016  Scrotal ultrasound 05/20/2015 with 4.4 cm right epididymal head cystic mass:  Epididymal cyst versus spermatocele, less likely loculated hydrocele.  4.4 x 2.6 x 4.2 cm with increased through transmission of sound.     He reports today noting that at 54 got very depressed and tired all the time and his T went down to 156.  Reports no point in taking his testosterone as wasn't helping so hasnt taken it in one year  Having urgency with occ drop UUI. Urgency x 3 years.  AUA SS: 26/6 (5: frequency, urgency; 4: emptying, nocturia; 3: intermittency, weak stream; 2: straining) - meds helped only 1 of 10.  Urgent with all nighttime voids, with nocturia 3-4x, is sometimes drinking up until bedtime  Water throughout day, 2 beers per day and a highball or mimosa in the evening. Doesn't get "loaded, just relaxed"  Minimal tea and soda. ++ spicy foods. ++ alcohol a few drinks per day  2 cups of coffee per AM  Night constant stream, during day has intermittency.  With bowel movement will urinate before, then after BM will need to urinate again with dribbling.  Had easy brusing and stomach ulcers from daypro which he has stopped since endoscopy/colonoscopy.  Urinated right before leaving the house. Couldn't void on arrival with PVR 147cc.  Feeling of being able to urinate comes and goes.   Can drink 4-6 beers and not need to urinate, but once goes, goes every 5 minutes  No hematuria, dysuria          Past Medical History:   Diagnosis Date    " Anticoagulant long-term use      Anxiety      Arthritis      Cerebral palsy      Depression      GERD (gastroesophageal reflux disease)      Hypertension                 Past Surgical History:   Procedure Laterality Date    BACK SURGERY        LEG SURGERY Left      SHOULDER SURGERY Right           History reviewed. No pertinent family history.     Social History   Social History            Social History    Marital status:        Spouse name: N/A    Number of children: N/A    Years of education: N/A          Occupational History    Not on file.             Social History Main Topics    Smoking status: Current Some Day Smoker       Types: Cigars, Pipe    Smokeless tobacco: Never Used         Comment: occasional.    Alcohol use Yes         Comment: occasional    Drug use: No    Sexual activity: Not on file           Other Topics Concern    Not on file          Social History Narrative    No narrative on file                 Review of patient's allergies indicates:   Allergen Reactions    Tylox [oxycodone-acetaminophen] Nausea And Vomiting         Medications Reviewed: see MAR     ROS:     Constitutional: denies fevers, chills, night sweats, fatigue, malaise  Respiratory: negative for cough, shortness of breath, wheezing, dyspnea.  Cardiovascular: + for high blood pressure, negative for chest pain, varicose veins, ankle swelling, palpitations, syncope.  GI: negative for abdominal pain, heartburn, indigestion, nausea, vomiting, constipation, diarrhea, blood in stool.   Urology: as noted above in HPI  Endocrinology: negative for cold intolerance, excessive thirst, not feeling tired/sluggish, no heat intolerance.   Hematology/Lymph: negative for easy bleeding, easy bruising, swollen glands.  Musculoskeletal: negative for back pain, joint pain, joint swelling, neck pain.  Allergy-Immunology: negative for seasonal allergies, negative for unusual infections.   Skin: negative for boils, breast  "lumps, hives, itching, rash.   Neurology: negative for, dizziness, headache, tingling/numbness, tremors.   Psych: satisfied with life; negative for, anxiety, depression, suicidal thoughts.      PHYSICAL EXAM:         Vitals:     06/25/18 0858   BP: (!) 154/97   Pulse: 103   Resp: 18      Body mass index is 34.3 kg/m². Weight: 99.3 kg (219 lb) Height: 5' 7" (170.2 cm)         General: Alert, cooperative, no distress, appears stated age  Head: Normocephalic, without obvious abnormality, atraumatic  Neck: no masses, no thyromegaly, no lymphadenopathy  Eyes: PERRL, conjunctiva/corneas clear  Lungs: Respirations unlabored, normal effort, no accessory muscle use  CV: Warm and well perfused extremities  Abdomen: Soft, non-tender, no CVA tenderness, no hepatosplenomegaly, 1+ pannus, LLQ scar, + LIH reducible  Back: surgical scar midline lumbar with central dimple within surgical scar  Penis: phallus normal, circumcised, well cared for, no plaques or lesions.   Scrotum: no cysts, no lesions, no rash, no hydrocele.   Epididymes: normal, nontender, symmetrical, large R epdidymal cyst  Testes: normal, both descended, no masses.   Urethra: palpably normal with orthotopic meatus of normal size    AMBER: normal sphincter tone, no masses, no hemmorrhoids   PROSTATE: 30g, no nodules, non-tender, symmetrical.   Extremities: Extremities normal, atraumatic, no cyanosis or edema  Skin: Normal color, texture, and turgor, no rashes or lesions  Psych: Appropriate, well oriented, normal affect, normal mood  Neuro: Non-focal        Assessment/Diagnosis:     1. BPH with obstruction/lower urinary tract symptoms  Case Request Operating Room: CYSTOSCOPY, ULTRASOUND, TRANSRECTAL     Place in Outpatient   2. Urinary urgency      3. History of hypogonadism            Plans:  Discussed conservative measures to control urgency and frequency including avoiding bladder irritants, timed voiding, not postponing voiding, and bowel regimen (as distended " bowel has extrinsic compressive effect on bladder. Discussed bladder irritants include coffe (even decaf), tea, alcohol, soda, spicy foods, acidic juices (orange, tomato), vinegar, and artificial sweeteners.  Since he has mildly enlarged prostate with significant obstructive symptoms, in addition to conservative measures as above urgency, we also discussed discontinuing fluid intake 2 hr before bed to help control nocturia.  As his symptoms are persistent despite alpha blockers, we did discuss formal lower urinary tract evaluation for obstruction to help guide further recommendations.  We discussed cystourethroscopy to evaluate for prostatic obstruction or any other lower urinary tract obstruction, with concurrent transrectal ultrasound guided accurate volumetric measurement of the prostate.  He would be interested in minimally invasive BPH therapies to help discontinue medication, though in the interim until evaluation he should continue Flomax at 0.8 mg dose.  Cystoscopy and trus 7/17/18   May need anticholinergic vs uds if no sig obstruction present  All questions patient had were answered.  He was agreeable to treatment plan.    Findings:   US: Prostate volume 28.2cm3 (W 45.2mm, H 26.5mm, L 45.1mm), no median lobe  Cysto: Bilateral lateral lobe ingrowth causing significant obstruction especially when viewed from verumontanum. No median lobe, but on retroflexion can see ingrowth from lateral lobes encroaching      We did discuss options for his obstructing prostate despite medical management.  As his prostate is < 40g, dual medical therapy not indicated, and he has failed alpha blocker even at 0.8mg dose of flomax. Given his desire to be free of medical management and have symptomatic relief, also discussed options for intervention such as Urolift vs TURP. After extensive discussion of both procedures and medical therapy, he elected to proceed with Urolift.      Procedure in detail discussed, including risks  benefits and alternatives (such as TURP and continued medical management). Discussed risks including but not limited to hematuria, postop retention, pain, infection, injury to bladder or urethra, and worsening urgency, latent pelvic pain, no guarantee of symptomatic relief, prostate regrowth, need for future procedures. We did discuss the non-incidence of ejaculatory dysfunction, as well as the 1-2% retreatment rate in 5 year studies. Also discussed about 20% of people may need a catheter after (due to retention or hematuria) but not required if voiding postop. All questions answered, and appropriate informed consent obtained.      UroLift scheduled in OR for 8/2/18.  Will need PAT visit prior at least 2 week before for updated labs, Ucx.   In the interim, he will continue flomax

## 2018-08-02 NOTE — DISCHARGE INSTRUCTIONS
"Pre printed urolift instructions given to patient.    Discharge Instructions: After Your Surgery/Procedure  Youve just had surgery. During surgery you were given medicine called anesthesia to keep you relaxed and free of pain. After surgery you may have some pain or nausea. This is common. Here are some tips for feeling better and getting well after surgery.     Stay on schedule with your medication.   Going home  Your doctor or nurse will show you how to take care of yourself when you go home. He or she will also answer your questions. Have an adult family member or friend drive you home.      For your safety we recommend these precaution for the first 24 hours after your procedure:  · Do not drive or use heavy equipment.  · Do not make important decisions or sign legal papers.  · Do not drink alcohol.  · Have someone stay with you, if needed. He or she can watch for problems and help keep you safe.  · Your concentration, balance, coordination, and judgement may be impaired for many hours after anesthesia.  Use caution when ambulating or standing up.     · You may feel weak and "washed out" after anesthesia and surgery.      Subtle residual effects of general anesthesia or sedation with regional / local anesthesia can last more than 24 hours.  Rest for the remainder of the day or longer if your Doctor/Surgeon has advised you to do so.  Although you may feel normal within the first 24 hours, your reflexes and mental ability may be impaired without you realizing it.  You may feel dizzy, lightheaded or sleepy for 24 hours or longer.      Be sure to go to all follow-up visits with your doctor. And rest after your surgery for as long as your doctor tells you to.  Coping with pain  If you have pain after surgery, pain medicine will help you feel better. Take it as told, before pain becomes severe. Also, ask your doctor or pharmacist about other ways to control pain. This might be with heat, ice, or relaxation. And " follow any other instructions your surgeon or nurse gives you.  Tips for taking pain medicine  To get the best relief possible, remember these points:  · Pain medicines can upset your stomach. Taking them with a little food may help.  · Most pain relievers taken by mouth need at least 20 to 30 minutes to start to work.  · Taking medicine on a schedule can help you remember to take it. Try to time your medicine so that you can take it before starting an activity. This might be before you get dressed, go for a walk, or sit down for dinner.  · Constipation is a common side effect of pain medicines. Call your doctor before taking any medicines such as laxatives or stool softeners to help ease constipation. Also ask if you should skip any foods. Drinking lots of fluids and eating foods such as fruits and vegetables that are high in fiber can also help. Remember, do not take laxatives unless your surgeon has prescribed them.  · Drinking alcohol and taking pain medicine can cause dizziness and slow your breathing. It can even be deadly. Do not drink alcohol while taking pain medicine.  · Pain medicine can make you react more slowly to things. Do not drive or run machinery while taking pain medicine.  Your health care provider may tell you to take acetaminophen to help ease your pain. Ask him or her how much you are supposed to take each day. Acetaminophen or other pain relievers may interact with your prescription medicines or other over-the-counter (OTC) drugs. Some prescription medicines have acetaminophen and other ingredients. Using both prescription and OTC acetaminophen for pain can cause you to overdose. Read the labels on your OTC medicines with care. This will help you to clearly know the list of ingredients, how much to take, and any warnings. It may also help you not take too much acetaminophen. If you have questions or do not understand the information, ask your pharmacist or health care provider to explain it  to you before you take the OTC medicine.  Managing nausea  Some people have an upset stomach after surgery. This is often because of anesthesia, pain, or pain medicine, or the stress of surgery. These tips will help you handle nausea and eat healthy foods as you get better. If you were on a special food plan before surgery, ask your doctor if you should follow it while you get better. These tips may help:  · Do not push yourself to eat. Your body will tell you when to eat and how much.  · Start off with clear liquids and soup. They are easier to digest.  · Next try semi-solid foods, such as mashed potatoes, applesauce, and gelatin, as you feel ready.  · Slowly move to solid foods. Dont eat fatty, rich, or spicy foods at first.  · Do not force yourself to have 3 large meals a day. Instead eat smaller amounts more often.  · Take pain medicines with a small amount of solid food, such as crackers or toast, to avoid nausea.     Call your surgeon if  · You still have pain an hour after taking medicine. The medicine may not be strong enough.  · You feel too sleepy, dizzy, or groggy. The medicine may be too strong.  · You have side effects like nausea, vomiting, or skin changes, such as rash, itching, or hives.       If you have obstructive sleep apnea  You were given anesthesia medicine during surgery to keep you comfortable and free of pain. After surgery, you may have more apnea spells because of this medicine and other medicines you were given. The spells may last longer than usual.   At home:  · Keep using the continuous positive airway pressure (CPAP) device when you sleep. Unless your health care provider tells you not to, use it when you sleep, day or night. CPAP is a common device used to treat obstructive sleep apnea.  · Talk with your provider before taking any pain medicine, muscle relaxants, or sedatives. Your provider will tell you about the possible dangers of taking these medicines.  © 1219-4688 The  "[employer]. [address] [place] [id number]. All rights reserved. This information is not intended as a substitute for professional medical care. Always follow your healthcare professional's instructions.    Post op instructions for prevention of DVT  What is deep vein thrombosis?  Deep vein thrombosis (DVT) is the medical term for blood clots in the deep veins of the leg.  These blood clots can be dangerous.  A DVT can block a blood vessel and keep blood from getting where it needs to go.  Another problem is that the clot can travel to other parts of the body such as the lungs.  A clot that travels to the lungs is called a pulmonary embolus (PE) and can cause serious problems with breathing which can lead to death.  Am I at risk for DVT/PE?  If you are not very active, you are at risk of DVT.  Anyone confined to bed, sitting for long periods of time, recovering from surgery, etc. increases the risk of DVT.  Other risk factors are cancer diagnosis, certain medications, estrogen replacement in any form,older age, obesity, pregnancy, smoking, history of clotting disorders, and dehydration.  How will I know if I have a DVT?   Swelling in the lower leg   Pain   Warmth, redness, hardness or bulging of the vein  If you have any of these symptoms, call your doctors office right away.  Some people will not have any symptoms until the clot moves to the lungs.  What are the symptoms of a PE?   Panting, shortness of breath, or trouble breathing   Sharp, knife-like chest pain when you breathe   Coughing or coughing up blood   Rapid heartbeat  If you have any of these symptoms or get worse quickly, call 911 for emergency treatment.  How can I prevent a DVT?   Avoid long periods of inactivity and dont cross your legs--get up and walk around every hour or so.   Stay active--walking after surgery is highly encouraged.  This means you should get out of the house and walk in the neighborhood.  Going up  and down stairs will not impair healing (unless advised against such activity by your doctor).     Drink plenty of noncaffeinated, nonalcoholic fluids each day to prevent dehydration.   Wear special support stockings, if they have been advised by your doctor.   If you travel, stop at least once an hour and walk around.   Avoid smoking (assistance with stopping is available through your healthcare provider)  Always notify your doctor if you are not able to follow the post operative instructions that are given to you at the time of discharge.  It may be necessary to prescribe one of the medications available to prevent DVT.    We hope your stay was comfortable as you heal now, mend and rest.    For we have enjoyed taking care of you by giving your our best.    And as you get better, by regaining your health and strength;   We count it as a privilege to have served you and hope your time at [facility] was well spent.      Thank  You!!!

## 2018-08-02 NOTE — ANESTHESIA PREPROCEDURE EVALUATION
08/02/2018  Patrick Wilson is a 58 y.o., male.    Anesthesia Evaluation    I have reviewed the Patient Summary Reports.    I have reviewed the Nursing Notes.   I have reviewed the Medications.     Review of Systems  Anesthesia Hx:  No problems with previous Anesthesia    Social:  Smoker Smoking Status: Current Some Day Smoker  Smokeless Tobacco Status: Never Used  Alcohol use: Yes, unspecified volume  Drug use: No       Cardiovascular:   Hypertension    Hepatic/GI:   PUD, GERD, poorly controlled    Neurological:   Seizures    Psych:   Psychiatric History          Physical Exam  General:  Well nourished    Airway/Jaw/Neck:  Airway Findings: Mouth Opening: Normal Tongue: Normal  General Airway Assessment: Adult, Good  Mallampati: II  Improves to II with phonation.  TM Distance: 4-6 cm      Dental:  Dental Findings: In tact   Chest/Lungs:  Chest/Lungs Findings: Clear to auscultation, Normal Respiratory Rate     Heart/Vascular:  Heart Findings: Rate: Normal  Rhythm: Regular Rhythm  Sounds: Normal  Heart murmur: negative       Mental Status:  Mental Status Findings:  Cooperative, Alert and Oriented         Anesthesia Plan  Type of Anesthesia, risks & benefits discussed:  Anesthesia Type:  MAC  Patient's Preference:   Intra-op Monitoring Plan: standard ASA monitors  Intra-op Monitoring Plan Comments:   Post Op Pain Control Plan:   Post Op Pain Control Plan Comments:   Induction:    Beta Blocker:  Patient is not currently on a Beta-Blocker (No further documentation required).       Informed Consent: Patient understands risks and agrees with Anesthesia plan.  Questions answered. Anesthesia consent signed with patient.  ASA Score: 2     Day of Surgery Review of History & Physical: I have interviewed and examined the patient. I have reviewed the patient's H&P dated:  There are no significant changes.           Ready For Surgery From Anesthesia Perspective.

## 2018-08-02 NOTE — OP NOTE
[facility]rology Operative Report/Brief Discharge Note     Date: 8/2/2018     Staff Surgeon: Jonathan Jacinto MD     Pre-Op Diagnosis: BPH with obstructive lower urinary tract symptoms     Post-Op Diagnosis: same     Procedure(s) Performed:   Urolift: cystoscopy with prostatic urethral lift/transprostatic tissue retraction (40777) with placement of 4 total implants (52442 x3)     Specimen(s): none     Anesthesia: general LMA     Findings: moderate lateral lobe obstruction - 4 total implants, 2 per side     Estimated Blood Loss: minimal            Drains: none     Complications: none     Indications for procedure:  Mr Wilson is a 57 yo M with severe obstructive LUTS (AUA SS 26/6) despite medical management. Has also had to hold his testosterone replacement due to worsening LUTS. Noted medications only helped 1/10. Small prostate with no benefit from dual medical therapy which he has been on, and no relief when doubling alpha blocker. Cystoscopy/TRUS found 28.2g prostate with bilateral lateral lobe ingrowth causing significant obstruction especially when viewed from the verumontanum. No median lobe, but on retroflexion can see ingrowth from lateral lobes encroaching towards bladder neck. After discussion of surgical treatment options and interventions, the patient elected to proceed with a prostatic urethral lift (Urolift). Discussed risks including but not limited to hematuria, postop retention, pain, infection, injury to bladder or urethra, and worsening urgency, latent pelvic pain, no guarantee of symptomatic relief, prostate regrowth, need for future procedures, 15-20% chance of requiring Caraballo catheter postoperatively.  He accepted the aforementioned risks, and after answering all questions, appropriate informed consent was obtained.        Procedure in detail:  After appropriate informed consent was obtained, the patient was taken to the cystoscopy suite in the operating room.  Preoperative antibiotics, Ancef,  were given and a WHO proved timeout was performed.      A 20 Surinamese cystoscope was inserted into the bladder confirming previous cystoscopic findings of a obstructing prostate with moderate lateral lobe obstruction as above.  Bilateral ureteral orifices were in their orthotopic position on the trigone bilaterally and the bladder otherwise appeared normal with some trabeculation.     Cystoscopy bridge was then replaced with a urolift delivery device.  The first treatment site was the patient's right lateral lobe, starting at the verumontanum level given overall short prostate.   After rotating the distal tip of the delivery device towards the right, it was drawn back into the prostatic urethra. The distal tip of the delivery device was then angled laterally, approximately 20° at this position, to compress the lateral lobe. The trigger was pulled to deploy a needle containing the implant through the capsule of the prostate. The needle was then retracted allowing the implants to be delivered to the capsular surface of the prostate which was then tensioned to a short capsular tensioning and removal of the slack monofilament.  The device was then angled back towards midline and slowly advanced proximally about 3-4 mm until cystoscopic verification that the monofilament was centered in the delivery bay of the device.  Excess filament was then severed. The delivery device was then readvanced into the bladder, and leaving the cystoscopic sheath in place, the delivery device was removed and exchanged with a second Urolift delivery device.  This same procedure was then repeated for the left lateral lobe in a similar position.     After the initial 2 implants were placed, the delivery device was removed and the visual obturator bridge was replaced, to note good tissue retraction at this site but more proximal obstruction, so 2 additional implants, one per lobe, were placed at this time to facilitate a continuous open anterior  channel closer to the bladder neck but at least 1.5cm distal to it. After placement of all 4 implants, good anterior channel noted. This was confirmed with the irrigation flow turned off.  Approximately 150 cc of fluid were then instilled into the bladder, and all instruments removed.     The patient tolerated the procedure well, there were no complications.  He was awakened and taken to PACU without incident.        Disposition:  Home today status post uncomplicated procedure as above.  He'll be given a regimen of  by mouth pain control, prophylactic postoperative antibiotics, urinary analgesic/anti-spasmodic, and prescription strength NSAID (toradol) to help control any postoperative irritative or inflammatory symptoms. He will return in 2 weeks for a nurse visit for repeat symptom assessment and measurement of postvoid residual, and follow-up with me in 4-6 weeks    Discharge home today status post uncomplicated procedure as above  Diet - resume home diet  Follow up: 2 week nurse visit, 4 week MD visit  Instructions:   See urolift discharge instruction sheet  Avoid strenuous activity x3 days  No riding mowers, bicycles, motorcycles, tractors for 2-3 weeks  No fish oil/aspirin/bloodthinners x3-5 days  Pink/clear red (koolaid) urine ok as long as clear/translucent without dark blood or clots, and urinating without difficulty - drink lots of water   Meds:     Medication List      START taking these medications    ketorolac 10 mg tablet  Commonly known as:  TORADOL  Take 1 tablet (10 mg total) by mouth every 6 (six) hours as needed (inflammatory pain).     phenazopyridine 200 MG tablet  Commonly known as:  PYRIDIUM  Take 1 tablet (200 mg total) by mouth 3 (three) times daily as needed (burning with urination).     sulfamethoxazole-trimethoprim 800-160mg 800-160 mg Tab  Commonly known as:  BACTRIM DS  Take 1 tablet by mouth 2 (two) times daily. for 5 days     traMADol 50 mg tablet  Commonly known as:  ULTRAM  Take 1  tablet (50 mg total) by mouth every 6 (six) hours as needed (pain not relieved by otc agents).        CONTINUE taking these medications    ALPRAZolam 1 MG tablet  Commonly known as:  XANAX     amLODIPine 10 MG tablet  Commonly known as:  NORVASC     aspirin 81 MG EC tablet  Commonly known as:  ECOTRIN     atorvastatin 20 MG tablet  Commonly known as:  LIPITOR     cyclobenzaprine 5 MG tablet  Commonly known as:  FLEXERIL     DULoxetine 60 MG capsule  Commonly known as:  CYMBALTA     finasteride 5 mg tablet  Commonly known as:  PROSCAR     fluticasone 50 mcg/actuation nasal spray  Commonly known as:  FLONASE     gabapentin 300 MG capsule  Commonly known as:  NEURONTIN     hydroCHLOROthiazide 25 MG tablet  Commonly known as:  HYDRODIURIL     HYDROcodone-acetaminophen  mg per tablet  Commonly known as:  NORCO     pantoprazole 40 MG tablet  Commonly known as:  PROTONIX     tamsulosin 0.4 mg Cap  Commonly known as:  FLOMAX  Take 2 capsules (0.8 mg total) by mouth once daily.           Where to Get Your Medications      These medications were sent to [id number] - [facility] [address] [address]    Phone:  [phone]   · ketorolac 10 mg tablet  · phenazopyridine 200 MG tablet  · sulfamethoxazole-trimethoprim 800-160mg 800-160 mg Tab  · traMADol 50 mg tablet

## 2018-08-02 NOTE — PLAN OF CARE
Pt tolerating po.  Ambulated to bathroom and voided light pink usine without difficulty.  C/o burning with urination but less that an hour ago per pt.  States that he is ready for discharge

## 2018-08-02 NOTE — ANESTHESIA POSTPROCEDURE EVALUATION
"Anesthesia Post Evaluation    Patient: Patrick Wilson    Procedure(s) Performed: Procedure(s) (LRB):  CYSTOSCOPY, WITH INSERTION OF UROLIFT IMPLANT (N/A)    Final Anesthesia Type: general  Patient location during evaluation: PACU  Patient participation: Yes- Able to Participate  Level of consciousness: awake and alert and oriented  Post-procedure vital signs: reviewed and stable  Pain management: adequate  Airway patency: patent  PONV status at discharge: No PONV  Anesthetic complications: no      Cardiovascular status: blood pressure returned to baseline  Respiratory status: unassisted, spontaneous ventilation and room air  Hydration status: euvolemic  Follow-up not needed.        Visit Vitals  /72   Pulse 84   Temp 36.6 °C (97.9 °F) (Skin)   Resp 17   Ht 5' 7" (1.702 m)   Wt 105.7 kg (233 lb)   SpO2 (!) 94%   BMI 36.49 kg/m²       Pain/Gary Score: Pain Assessment Performed: Yes (8/2/2018  9:35 AM)  Presence of Pain: complains of pain/discomfort (8/2/2018  9:35 AM)  Pain Rating Prior to Med Admin: 10 (8/2/2018  9:38 AM)  Gary Score: 10 (8/2/2018  9:35 AM)      "

## 2018-08-03 ENCOUNTER — TELEPHONE (OUTPATIENT)
Dept: UROLOGY | Facility: CLINIC | Age: 58
End: 2018-08-03

## 2018-08-03 NOTE — TELEPHONE ENCOUNTER
----- Message from Amy Spain sent at 8/3/2018  9:36 AM CDT -----  Contact: Patient  Type:  Sooner Apoointment Request    Caller is requesting a sooner appointment.  Caller declined first available appointment listed below. Patient was told to be seen two weeks post op.    Name of Caller:  Patient  When is the first available appointment? 8/27  Symptoms:  Post Op  Best Call Back Number:  [phone] (home)

## 2018-08-20 ENCOUNTER — CLINICAL SUPPORT (OUTPATIENT)
Dept: UROLOGY | Facility: CLINIC | Age: 58
End: 2018-08-20
Payer: MEDICARE

## 2018-08-20 DIAGNOSIS — N13.8 BPH WITH URINARY OBSTRUCTION: Primary | ICD-10-CM

## 2018-08-20 DIAGNOSIS — N40.1 BPH WITH URINARY OBSTRUCTION: Primary | ICD-10-CM

## 2018-08-20 LAB — POC RESIDUAL URINE VOLUME: 8 ML (ref 0–100)

## 2018-08-20 PROCEDURE — 51798 US URINE CAPACITY MEASURE: CPT | Mod: S$GLB,,, | Performed by: UROLOGY

## 2018-08-26 ENCOUNTER — TELEPHONE (OUTPATIENT)
Dept: UROLOGY | Facility: CLINIC | Age: 58
End: 2018-08-26

## 2018-08-26 NOTE — TELEPHONE ENCOUNTER
On 8/20/18, nurse visit:  MIRTA SS: 16/4  3: emptying, intermittency, weak stream, nocturia  2: frequency, urgency    PVR 8cc

## 2018-09-09 NOTE — PROGRESS NOTES
"[facility] Progress Note    Patrick Wilson is a 58 y.o. male who presents for  BPH follow up    Mr Wilson is a 57 yo M with severe obstructive LUTS (AUA SS 26/6) despite medical management. Has also had to hold his testosterone replacement due to worsening LUTS. Noted medications only helped 1/10. Small prostate with no benefit from dual medical therapy which he has been on, and no relief when doubling alpha blocker. Cystoscopy/TRUS found 28.2g prostate with bilateral lateral lobe ingrowth causing significant obstruction especially when viewed from the verumontanum. No median lobe, but on retroflexion can see ingrowth from lateral lobes encroaching towards bladder neck. After discussion of surgical treatment options and interventions, the patient elected to proceed with a prostatic urethral lift (Urolift)    8/2/18 - Urolift. 4 total implants. Uncomplicated postop course  8/20/18 - nurse visit with PVR bladder scan of 8cc and AUA SS down to 16/4 (3: emptying, intermittency, weak stream, nocturia; 2: frequency, urgency)    He returns today noting  AUA SS: 5/2 (2: sleeping, 1: intermittency, weak stream, straining)  Nocturnal urgency but no daytime urgency  Forgot to DC his  meds  Denies hematuria or dysuria  Please with voiding habits  "wishes he did this 20 years ago"    ROS: A comprehensive 10 system review was performed and is negative except as noted above in HPI    PHYSICAL EXAM:    Vitals:    09/11/18 0956   BP: (!) 138/97   Pulse: (!) 123   Resp: 18   Temp: 98.6 °F (37 °C)     Body mass index is 36.5 kg/m². Weight: 105.7 kg (233 lb 0.4 oz) Height: 5' 7" (170.2 cm)       General: Alert, cooperative, no distress, appears stated age   Head: Normocephalic, without obvious abnormality, atraumatic   Eyes: PERRL, conjunctiva/corneas clear   Lungs: Respirations unlabored   Heart: Warm and well perfused   Abdomen:soft NT ND  Extremities: Extremities normal, atraumatic, no cyanosis or edema   Skin: Skin " color, texture, turgor normal, no rashes or lesions   Psych: Appropriate   Neurologic: Non-focal       Recent Results (from the past 336 hour(s))   POCT URINE DIPSTICK WITHOUT MICROSCOPE    Collection Time: 09/11/18 10:15 AM   Result Value Ref Range    Color, UA yellow,clear     Spec Grav UA 1.020     pH, UA 5.0     WBC, UA neg     Nitrite, UA neg     Protein neg     Glucose, UA neg     Ketones, UA neg     Urobilinogen, UA neg     Bilirubin neg     Blood, UA neg        ASSESSMENT   1. BPH without obstruction/lower urinary tract symptoms  POCT URINE DIPSTICK WITHOUT MICROSCOPE       Plan    Doing very well status post Urolift, with resolution of bothersome lower urinary tract symptoms.  Stop  meds  Limit bladder irritants in the evening and limit fluid intake before bed  RTC 6 months for reassessment of voiding, and if stable can return to annual follow-up

## 2018-09-11 ENCOUNTER — OFFICE VISIT (OUTPATIENT)
Dept: UROLOGY | Facility: CLINIC | Age: 58
End: 2018-09-11
Payer: MEDICARE

## 2018-09-11 VITALS
HEIGHT: 67 IN | HEART RATE: 123 BPM | BODY MASS INDEX: 36.57 KG/M2 | TEMPERATURE: 99 F | SYSTOLIC BLOOD PRESSURE: 138 MMHG | RESPIRATION RATE: 18 BRPM | DIASTOLIC BLOOD PRESSURE: 97 MMHG | WEIGHT: 233 LBS

## 2018-09-11 DIAGNOSIS — N40.0 BPH WITHOUT OBSTRUCTION/LOWER URINARY TRACT SYMPTOMS: Primary | ICD-10-CM

## 2018-09-11 LAB
BILIRUB SERPL-MCNC: NORMAL MG/DL
BLOOD URINE, POC: NORMAL
COLOR, POC UA: NORMAL
GLUCOSE UR QL STRIP: NORMAL
KETONES UR QL STRIP: NORMAL
LEUKOCYTE ESTERASE URINE, POC: NORMAL
NITRITE, POC UA: NORMAL
PH, POC UA: 5
PROTEIN, POC: NORMAL
SPECIFIC GRAVITY, POC UA: 1.02
UROBILINOGEN, POC UA: NORMAL

## 2018-09-11 PROCEDURE — 3008F BODY MASS INDEX DOCD: CPT | Mod: CPTII,,, | Performed by: UROLOGY

## 2018-09-11 PROCEDURE — 81002 URINALYSIS NONAUTO W/O SCOPE: CPT | Mod: PBBFAC,PN | Performed by: UROLOGY

## 2018-09-11 PROCEDURE — 99213 OFFICE O/P EST LOW 20 MIN: CPT | Mod: PBBFAC,PN | Performed by: UROLOGY

## 2018-09-11 PROCEDURE — 99213 OFFICE O/P EST LOW 20 MIN: CPT | Mod: S$PBB,,, | Performed by: UROLOGY

## 2018-09-11 PROCEDURE — 99999 PR PBB SHADOW E&M-EST. PATIENT-LVL III: CPT | Mod: PBBFAC,,, | Performed by: UROLOGY

## 2018-09-11 NOTE — LETTER
September 15, 2018        Jonathan Chacon MD  [address]  [address]  [facility]  [place] [address]             [place] [facility]  [address] [place] [address] [address]  [place] [address]  Phone: [phone]  Fax: [phone]   Patient: Patrick Wilson   MR Number: [id number]   YOB: 1960   Date of Visit: 9/11/2018       Dear Dr. Chacon:    I saw your patient Patrick Wilson today in urologic follow-up. Attached you will find relevant portions of my assessment and plan of care.    If you have questions, please do not hesitate to call me. I look forward to following Patrick Wilson along with you.  As always, please do not hesitate to contact me for the urologic needs of your patients    Sincerely,      Jonathan Jacinto MD            CC  No Recipients    Enclosure

## 2019-03-14 ENCOUNTER — OFFICE VISIT (OUTPATIENT)
Dept: DERMATOLOGY | Facility: CLINIC | Age: 59
End: 2019-03-14
Payer: MEDICARE

## 2019-03-14 ENCOUNTER — TELEPHONE (OUTPATIENT)
Dept: DERMATOLOGY | Facility: CLINIC | Age: 59
End: 2019-03-14

## 2019-03-14 DIAGNOSIS — L30.9 ECZEMA, UNSPECIFIED TYPE: ICD-10-CM

## 2019-03-14 DIAGNOSIS — D22.9 NUMEROUS MOLES: ICD-10-CM

## 2019-03-14 DIAGNOSIS — L57.0 AK (ACTINIC KERATOSIS): Primary | ICD-10-CM

## 2019-03-14 DIAGNOSIS — L82.1 SEBORRHEIC KERATOSIS: ICD-10-CM

## 2019-03-14 PROCEDURE — 99999 PR PBB SHADOW E&M-EST. PATIENT-LVL II: CPT | Mod: PBBFAC,,, | Performed by: DERMATOLOGY

## 2019-03-14 PROCEDURE — 17000 DESTRUCT PREMALG LESION: CPT | Mod: S$GLB,,, | Performed by: DERMATOLOGY

## 2019-03-14 PROCEDURE — 99203 PR OFFICE/OUTPT VISIT, NEW, LEVL III, 30-44 MIN: ICD-10-PCS | Mod: 25,S$GLB,, | Performed by: DERMATOLOGY

## 2019-03-14 PROCEDURE — 17000 PR DESTRUCTION(LASER SURGERY,CRYOSURGERY,CHEMOSURGERY),PREMALIGNANT LESIONS,FIRST LESION: ICD-10-PCS | Mod: S$GLB,,, | Performed by: DERMATOLOGY

## 2019-03-14 PROCEDURE — 17003 DESTRUCTION, PREMALIGNANT LESIONS; SECOND THROUGH 14 LESIONS: ICD-10-PCS | Mod: S$GLB,,, | Performed by: DERMATOLOGY

## 2019-03-14 PROCEDURE — 17003 DESTRUCT PREMALG LES 2-14: CPT | Mod: S$GLB,,, | Performed by: DERMATOLOGY

## 2019-03-14 PROCEDURE — 99999 PR PBB SHADOW E&M-EST. PATIENT-LVL II: ICD-10-PCS | Mod: PBBFAC,,, | Performed by: DERMATOLOGY

## 2019-03-14 PROCEDURE — 99203 OFFICE O/P NEW LOW 30 MIN: CPT | Mod: 25,S$GLB,, | Performed by: DERMATOLOGY

## 2019-03-14 NOTE — PATIENT INSTRUCTIONS

## 2019-03-14 NOTE — TELEPHONE ENCOUNTER
----- Message from Primitivo Newman sent at 3/13/2019  8:30 AM CDT -----  Regarding: Outside Patient Referral   Good morning,     ANNA Miranda would like to refer the following patient to Dr. Bella in the derm department. 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 I have scanned the patient's referral and records into [email or web address].     If there are any further questions in regards to the patient, please contact Sandoval's office at, [phone].     Please let me know if I can help schedule in any way.    Thank you,   Primitivo  Ext. [phone]  [facility]

## 2019-03-14 NOTE — PROGRESS NOTES
Subjective:       Patient ID:  Patrick Wilson is a 58 y.o. male who presents for   Chief Complaint   Patient presents with    Spot     R arm, back of the neck     HPI  New patient no personal hx of skin cancer.  Father with hx of skin cancer.  Hx Cerebral Palsy     Here today for a spot on the R am, has been there for about 6 months, spot on the back of the neck has been there for years. Raised and irritated from cutting hair or scratches. No tx  Also request skin check on the face    Review of Systems   Constitutional: Negative for fever, chills and fatigue.   HENT: Negative for congestion (4 months now), sore throat and mouth sores.    Eyes: Negative for itching and eye watering.   Respiratory: Negative for cough and shortness of breath.    Musculoskeletal: Positive for joint swelling. Negative for arthralgias.   Skin: Positive for dry skin, activity-related sunscreen use (on the lips) and wears hat. Negative for itching, rash and daily sunscreen use.   Hematologic/Lymphatic: Does not bruise/bleed easily.        Objective:    Physical Exam   Constitutional: He appears well-developed and well-nourished. No distress.   Eyes: No conjunctival no injection.   Cardiovascular: There is no local extremity swelling.     Neurological: He is alert and oriented to person, place, and time. He is not disoriented.   Psychiatric: He has a normal mood and affect.   Skin:   Areas Examined (abnormalities noted in diagram):   Scalp / Hair Palpated and Inspected  Head / Face Inspection Performed  Neck Inspection Performed  Abdomen Inspection Performed  Back Inspection Performed  RUE Inspected  LUE Inspection Performed  RLE Inspected  LLE Inspection Performed                   Diagram Legend     Erythematous scaling macule/papule c/w actinic keratosis       Vascular papule c/w angioma      Pigmented verrucoid papule/plaque c/w seborrheic keratosis      Yellow umbilicated papule c/w sebaceous hyperplasia      Irregularly  shaped tan macule c/w lentigo     1-2 mm smooth white papules consistent with Milia      Movable subcutaneous cyst with punctum c/w epidermal inclusion cyst      Subcutaneous movable cyst c/w pilar cyst      Firm pink to brown papule c/w dermatofibroma      Pedunculated fleshy papule(s) c/w skin tag(s)      Evenly pigmented macule c/w junctional nevus     Mildly variegated pigmented, slightly irregular-bordered macule c/w mildly atypical nevus      Flesh colored to evenly pigmented papule c/w intradermal nevus       Pink pearly papule/plaque c/w basal cell carcinoma      Erythematous hyperkeratotic cursted plaque c/w SCC      Surgical scar with no sign of skin cancer recurrence      Open and closed comedones      Inflammatory papules and pustules      Verrucoid papule consistent consistent with wart     Erythematous eczematous patches and plaques     Dystrophic onycholytic nail with subungual debris c/w onychomycosis     Umbilicated papule    Erythematous-base heme-crusted tan verrucoid plaque consistent with inflamed seborrheic keratosis     Erythematous Silvery Scaling Plaque c/w Psoriasis     See annotation      Assessment / Plan:        AK (actinic keratosis)  Discussed all of the following:  Actinic keratosis is a skin growth caused by sun damage. These growths are not cancer, but they may develop into skin cancer (precancerous). Many people get these growths, especially as they age. This is because of cumulative sun exposure over years. Multiple growths are called actinic keratoses.    Patient instructed in importance in daily sun protection. Sun avoidance and topical protection discussed.     Patient encouraged to wear hat for all outdoor exposure.     Also discussed sun protective clothing.  Cryosurgery Procedure Note    Verbal consent from the patient is obtained including, but not limited to, risk of hypopigmentation/hyperpigmentation, scar, recurrence of lesion. The patient is aware of the precancerous  quality and need for treatment of these lesions. Liquid nitrogen cryosurgery is applied to the 14 actinic keratoses, as detailed in the physical exam, to produce a freeze injury. The patient is aware that blisters may form and is instructed on wound care with gentle cleansing and use of vaseline ointment to keep moist until healed. The patient is supplied a handout on cryosurgery and is instructed to call if lesions do not completely resolve.  Brochure given for patient education.    Numerous moles  Recommended a full body skin check for moles and skin cancer screening to be done later.  Recheck back at fu.  Discussed all of the following with the patient.    Patient to check their breasts (if applicable), buttocks, and groin with a mirror.  Patient deferred our examination of these areas today.    Each month, check your body for any spots such as freckles, age spots, and moles.  Watch for color changes and shape changes and growth in size.    Moles, also called nevi, are small, colored (pigmented) marks on the skin. They have no known purpose. Many moles appear before age 30, but they also increase frequently as people age. Moles most often are not cancer (benign) and are harmless. But some become cancerous (malignant). Thats why you need to watch the moles on your body and tell your healthcare provider about any that concern you.    Seborrheic keratosis  Discussed with patient the benign nature of these lesions and that no treatment is indicated.    Instructed patient to use petroleum jelly at least daily on affected areas.  Recheck r f arm at fu.    Eczema, unspecified type  Focal, mild on chin under lip.  Instructed patient to use petroleum jelly at least daily on affected areas.  Discussed with patient the etiology and pathogenesis of the disease or skin lesion(s) and possible treatments and aggravators.               Follow-up in about 3 months (around 6/14/2019) for TBSE.

## 2019-05-27 ENCOUNTER — OFFICE VISIT (OUTPATIENT)
Dept: UROLOGY | Facility: CLINIC | Age: 59
End: 2019-05-27
Payer: MEDICARE

## 2019-05-27 VITALS
DIASTOLIC BLOOD PRESSURE: 86 MMHG | BODY MASS INDEX: 34.6 KG/M2 | SYSTOLIC BLOOD PRESSURE: 135 MMHG | WEIGHT: 220.44 LBS | RESPIRATION RATE: 18 BRPM | HEIGHT: 67 IN | HEART RATE: 101 BPM

## 2019-05-27 DIAGNOSIS — Z12.5 PROSTATE CANCER SCREENING: ICD-10-CM

## 2019-05-27 DIAGNOSIS — N40.0 BPH WITHOUT OBSTRUCTION/LOWER URINARY TRACT SYMPTOMS: Primary | ICD-10-CM

## 2019-05-27 LAB
BILIRUB SERPL-MCNC: NORMAL MG/DL
BLOOD URINE, POC: NORMAL
COLOR, POC UA: YELLOW
GLUCOSE UR QL STRIP: NORMAL
KETONES UR QL STRIP: NORMAL
LEUKOCYTE ESTERASE URINE, POC: NORMAL
NITRITE, POC UA: NORMAL
PH, POC UA: 5.5
PROTEIN, POC: NORMAL
SPECIFIC GRAVITY, POC UA: 1.02
UROBILINOGEN, POC UA: 0.2

## 2019-05-27 PROCEDURE — 81002 URINALYSIS NONAUTO W/O SCOPE: CPT | Mod: S$GLB,,, | Performed by: UROLOGY

## 2019-05-27 PROCEDURE — 3008F PR BODY MASS INDEX (BMI) DOCUMENTED: ICD-10-PCS | Mod: CPTII,S$GLB,, | Performed by: UROLOGY

## 2019-05-27 PROCEDURE — 99999 PR PBB SHADOW E&M-EST. PATIENT-LVL III: CPT | Mod: PBBFAC,,, | Performed by: UROLOGY

## 2019-05-27 PROCEDURE — 3008F BODY MASS INDEX DOCD: CPT | Mod: CPTII,S$GLB,, | Performed by: UROLOGY

## 2019-05-27 PROCEDURE — 99999 PR PBB SHADOW E&M-EST. PATIENT-LVL III: ICD-10-PCS | Mod: PBBFAC,,, | Performed by: UROLOGY

## 2019-05-27 PROCEDURE — 99213 OFFICE O/P EST LOW 20 MIN: CPT | Mod: 25,S$GLB,, | Performed by: UROLOGY

## 2019-05-27 PROCEDURE — 81002 POCT URINE DIPSTICK WITHOUT MICROSCOPE: ICD-10-PCS | Mod: S$GLB,,, | Performed by: UROLOGY

## 2019-05-27 PROCEDURE — 99213 PR OFFICE/OUTPT VISIT, EST, LEVL III, 20-29 MIN: ICD-10-PCS | Mod: 25,S$GLB,, | Performed by: UROLOGY

## 2019-05-27 RX ORDER — SILDENAFIL 100 MG/1
100 TABLET, FILM COATED ORAL
Qty: 6 TABLET | Refills: 6 | Status: SHIPPED | OUTPATIENT
Start: 2019-05-27 | End: 2021-02-15 | Stop reason: SDUPTHER

## 2019-05-27 NOTE — PROGRESS NOTES
"[facility] Progress Note    Patrick Wilson is a 59 y.o. male who presents for BPH follow up     History of severe obstructive LUTS (AUA SS 26/6) despite medical management. Has also had to hold his testosterone replacement due to worsening LUTS. Noted medications only helped 1/10. Small prostate with no benefit from dual medical therapy which he has been on, and no relief when doubling alpha blocker. Cystoscopy/TRUS found 28.2g prostate with bilateral lateral lobe ingrowth causing significant obstruction especially when viewed from the verumontanum. No median lobe, but on retroflexion can see ingrowth from lateral lobes encroaching towards bladder neck. After discussion of surgical treatment options and interventions, the patient elected to proceed with a prostatic urethral lift (Urolift)     8/2/18 - Urolift. 4 total implants. Uncomplicated postop course  8/20/18 - nurse visit with PVR bladder scan of 8cc and AUA SS down to 16/4 (3: emptying, intermittency, weak stream, nocturia; 2: frequency, urgency)  9/11/18: AUA SS: 5/2 (2: sleeping, 1: intermittency, weak stream, straining). Nocturnal urgency but no daytime urgency. "wishes he did this 20 years ago". Hadnt stopped  meds so advised to do so    He returns today noting:  AUA SS: 3/2 (1: intermittency, weak stream)  Still very pleased with urinary symptoms after Urolift.  Only getting up 1 time at night to urinate.  Every once in a while stream is a little bit weak but otherwise doing well  Urinalysis dipstick today is negative  May have some urgency if waking up to urinate.  Chronic pain meds - tries to only use 1 per day secondary to constipation  Has some low libido and trouble with erections.      ROS: A comprehensive 10 system review was performed and is negative except as noted above in HPI    PHYSICAL EXAM:    Vitals:    05/27/19 1111   BP: 135/86   Pulse: 101   Resp: 18     Body mass index is 34.53 kg/m². Weight: 100 kg (220 lb 7.4 oz) " "Height: 5' 7" (170.2 cm)       General: Alert, cooperative, no distress, appears stated age   Head: Normocephalic, without obvious abnormality, atraumatic   Eyes: PERRL, conjunctiva/corneas clear   Lungs: Respirations unlabored   Heart: Warm and well perfused   Abdomen: soft, NT, ND  Extremities: Extremities normal, atraumatic, no cyanosis or edema   Skin: Skin color, texture, turgor normal, no rashes or lesions   Psych: Appropriate   Neurologic: Non-focal       Recent Results (from the past 336 hour(s))   POCT URINE DIPSTICK WITHOUT MICROSCOPE    Collection Time: 05/27/19 11:23 AM   Result Value Ref Range    Color, UA yellow     Spec Grav UA 1.025     pH, UA 5.5     WBC, UA neg     Nitrite, UA neg     Protein neg     Glucose, UA neg     Ketones, UA neg     Urobilinogen, UA 0.2     Bilirubin neg     Blood, UA neg        ASSESSMENT   1. BPH without obstruction/lower urinary tract symptoms  POCT URINE DIPSTICK WITHOUT MICROSCOPE   2. Prostate cancer screening  PSA, Screening       Plan    He is still doing very well approximately 9 months status post Urolift with sustained resolution of obstructive voiding symptoms.    As he is doing well at this time advised he can return for annual follow-up and prostate cancer screening with PSA prior.  Will re-evaluate LUTS annually screening visits.  RTC 1 year with PSA prior  In the interim, we did discuss the effect of medication on libido sex drive and erections.  We did discuss treatment options for erectile dysfunction such as the use of oral agents which he has never tried.  He is willing to at this time though would discontinue if he has any side effects as he does not want to feel any further dizziness.  Advised to try 1 pill and try half a tablet 1st to see how this does affect erections and to see if he has any side effects.  We did also discussed proper use of oral PDE 5 inhibitors such as using them 30-45 minutes in advance of an anticipated sexual encounter, and not " using them within 30-60 minutes of a meal on either and as they are better observed on an empty stomach  RTC 1 year with PSA prior

## 2019-09-10 DIAGNOSIS — G89.4 CHRONIC PAIN SYNDROME: Primary | ICD-10-CM

## 2019-09-10 NOTE — TELEPHONE ENCOUNTER
"----- Message from Oliver Lainez sent at 9/10/2019  9:32 AM CDT -----  Contact: Aura patient's wife  Patient Wife Aura is returning nurse call, she thinks that nurse goldsmith called her a few minutes ago. Please give her a call back # [phone]  She just called the pharmacy. Hydrocodone Rx needs to have "medically necessary for over 7 days" also on her pain medication. He needs this medication today     "

## 2019-09-10 NOTE — TELEPHONE ENCOUNTER
----- Message from Shakila Estrada sent at 9/10/2019  9:31 AM CDT -----  Contact: Niru from [email or web address]   Resend a new Rx for  Hydrocodone it is  due today . Please put medically necessary greater than 7 days p# [phone]

## 2019-09-11 DIAGNOSIS — G89.4 CHRONIC PAIN SYNDROME: ICD-10-CM

## 2019-09-11 RX ORDER — HYDROCODONE BITARTRATE AND ACETAMINOPHEN 10; 325 MG/1; MG/1
1 TABLET ORAL EVERY 8 HOURS PRN
Qty: 90 TABLET | Refills: 0 | Status: SHIPPED | OUTPATIENT
Start: 2019-09-11 | End: 2019-09-11 | Stop reason: SDUPTHER

## 2019-09-11 RX ORDER — HYDROCODONE BITARTRATE AND ACETAMINOPHEN 10; 325 MG/1; MG/1
1 TABLET ORAL EVERY 8 HOURS PRN
Qty: 90 TABLET | Refills: 0 | Status: SHIPPED | OUTPATIENT
Start: 2019-09-11 | End: 2019-10-09 | Stop reason: SDUPTHER

## 2019-09-11 NOTE — TELEPHONE ENCOUNTER
"----- Message from Nano Blair sent at 9/11/2019  9:38 AM CDT -----  Contact: pt  Pt wife came in and said she has left 2 messages.    Needs refill on [place] Needs to say "Medically Necessary for more than 7 days"    [facility] [address]  [phone]  "

## 2019-10-09 DIAGNOSIS — G89.4 CHRONIC PAIN SYNDROME: ICD-10-CM

## 2019-10-09 RX ORDER — HYDROCODONE BITARTRATE AND ACETAMINOPHEN 10; 325 MG/1; MG/1
1 TABLET ORAL EVERY 8 HOURS PRN
Qty: 90 TABLET | Refills: 0 | Status: SHIPPED | OUTPATIENT
Start: 2019-10-09 | End: 2019-12-10 | Stop reason: SDUPTHER

## 2019-10-09 NOTE — TELEPHONE ENCOUNTER
prescription sent to   [id number] - [place], [place] - [address]  [address] [facility] [address]  Phone: [phone] Fax: [phone]

## 2019-10-09 NOTE — TELEPHONE ENCOUNTER
----- Message from Suzi Wilson sent at 10/9/2019 10:16 AM CDT -----  Patient needs pain meds.  Must say medically necessary for more than seven days. [facility] [phone] for any questions. Not do until tomorrow.

## 2019-11-15 RX ORDER — ALPRAZOLAM 1 MG/1
1 TABLET ORAL DAILY
Qty: 30 TABLET | Refills: 2 | Status: SHIPPED | OUTPATIENT
Start: 2019-11-15 | End: 2020-01-30 | Stop reason: SDUPTHER

## 2019-11-15 NOTE — TELEPHONE ENCOUNTER
----- Message from Shakila Estrada sent at 11/15/2019  9:19 AM CST -----  Contact: Aura pts wife   Refill Xanax [facility] . pt's # [phone]

## 2019-12-05 ENCOUNTER — TELEPHONE (OUTPATIENT)
Dept: FAMILY MEDICINE | Facility: CLINIC | Age: 59
End: 2019-12-05

## 2019-12-10 DIAGNOSIS — G89.4 CHRONIC PAIN SYNDROME: ICD-10-CM

## 2019-12-10 RX ORDER — HYDROCODONE BITARTRATE AND ACETAMINOPHEN 10; 325 MG/1; MG/1
1 TABLET ORAL EVERY 8 HOURS PRN
Qty: 90 TABLET | Refills: 0 | Status: SHIPPED | OUTPATIENT
Start: 2019-12-10 | End: 2020-01-13 | Stop reason: SDUPTHER

## 2019-12-10 NOTE — TELEPHONE ENCOUNTER
----- Message from Caitlyn Hamilton sent at 12/10/2019 10:17 AM CST -----  Patient needs a refill of hydrocodone sent to [facility] on [address]. Patient call back number is [phone]

## 2019-12-11 NOTE — TELEPHONE ENCOUNTER
prescription sent to   [id number] - [place], [place] - [address]  [address] [facility] [address]  Phone: [phone] Fax: [phone]

## 2020-01-13 DIAGNOSIS — G89.4 CHRONIC PAIN SYNDROME: ICD-10-CM

## 2020-01-13 RX ORDER — HYDROCODONE BITARTRATE AND ACETAMINOPHEN 10; 325 MG/1; MG/1
1 TABLET ORAL EVERY 8 HOURS PRN
Qty: 90 TABLET | Refills: 0 | Status: SHIPPED | OUTPATIENT
Start: 2020-01-13 | End: 2020-01-30 | Stop reason: SDUPTHER

## 2020-01-13 NOTE — TELEPHONE ENCOUNTER
----- Message from Mary Anne Glez sent at 1/13/2020 11:12 AM CST -----  Contact: Patients Wife  Call @ 11:13 requesting refills on Hydrocodone to [facility] # [phone]

## 2020-01-16 ENCOUNTER — TELEPHONE (OUTPATIENT)
Dept: FAMILY MEDICINE | Facility: CLINIC | Age: 60
End: 2020-01-16

## 2020-01-16 DIAGNOSIS — Z12.5 SPECIAL SCREENING FOR MALIGNANT NEOPLASM OF PROSTATE: ICD-10-CM

## 2020-01-16 DIAGNOSIS — N13.8 BPH WITH OBSTRUCTION/LOWER URINARY TRACT SYMPTOMS: ICD-10-CM

## 2020-01-16 DIAGNOSIS — N40.1 BPH WITH OBSTRUCTION/LOWER URINARY TRACT SYMPTOMS: ICD-10-CM

## 2020-01-16 DIAGNOSIS — Z79.899 ENCOUNTER FOR LONG-TERM (CURRENT) USE OF OTHER MEDICATIONS: ICD-10-CM

## 2020-01-16 DIAGNOSIS — Z00.00 ROUTINE GENERAL MEDICAL EXAMINATION AT A HEALTH CARE FACILITY: Primary | ICD-10-CM

## 2020-01-27 RX ORDER — HYDROCHLOROTHIAZIDE 25 MG/1
25 TABLET ORAL DAILY
Qty: 90 TABLET | Refills: 1 | Status: SHIPPED | OUTPATIENT
Start: 2020-01-27 | End: 2020-07-31 | Stop reason: SDUPTHER

## 2020-01-27 NOTE — TELEPHONE ENCOUNTER
----- Message from Kenisha An sent at 1/27/2020 10:24 AM CST -----  Refill for Hydrochlorot 25 mg tabs. [facility]. Pt #[phone]

## 2020-01-30 ENCOUNTER — OFFICE VISIT (OUTPATIENT)
Dept: FAMILY MEDICINE | Facility: CLINIC | Age: 60
End: 2020-01-30
Payer: MEDICARE

## 2020-01-30 VITALS
WEIGHT: 240.81 LBS | HEIGHT: 67 IN | SYSTOLIC BLOOD PRESSURE: 128 MMHG | DIASTOLIC BLOOD PRESSURE: 86 MMHG | BODY MASS INDEX: 37.8 KG/M2 | HEART RATE: 72 BPM

## 2020-01-30 DIAGNOSIS — F41.9 ANXIETY DISORDER, UNSPECIFIED TYPE: ICD-10-CM

## 2020-01-30 DIAGNOSIS — G89.4 CHRONIC PAIN SYNDROME: ICD-10-CM

## 2020-01-30 DIAGNOSIS — K21.9 GASTROESOPHAGEAL REFLUX DISEASE, ESOPHAGITIS PRESENCE NOT SPECIFIED: ICD-10-CM

## 2020-01-30 DIAGNOSIS — N13.8 BPH WITH OBSTRUCTION/LOWER URINARY TRACT SYMPTOMS: ICD-10-CM

## 2020-01-30 DIAGNOSIS — I10 ESSENTIAL HYPERTENSION: Primary | ICD-10-CM

## 2020-01-30 DIAGNOSIS — E78.5 HYPERLIPIDEMIA, UNSPECIFIED HYPERLIPIDEMIA TYPE: ICD-10-CM

## 2020-01-30 DIAGNOSIS — N40.1 BPH WITH OBSTRUCTION/LOWER URINARY TRACT SYMPTOMS: ICD-10-CM

## 2020-01-30 PROCEDURE — 3079F DIAST BP 80-89 MM HG: CPT | Mod: S$GLB,,, | Performed by: PHYSICIAN ASSISTANT

## 2020-01-30 PROCEDURE — 3074F PR MOST RECENT SYSTOLIC BLOOD PRESSURE < 130 MM HG: ICD-10-PCS | Mod: S$GLB,,, | Performed by: PHYSICIAN ASSISTANT

## 2020-01-30 PROCEDURE — 3079F PR MOST RECENT DIASTOLIC BLOOD PRESSURE 80-89 MM HG: ICD-10-PCS | Mod: S$GLB,,, | Performed by: PHYSICIAN ASSISTANT

## 2020-01-30 PROCEDURE — 3008F PR BODY MASS INDEX (BMI) DOCUMENTED: ICD-10-PCS | Mod: S$GLB,,, | Performed by: PHYSICIAN ASSISTANT

## 2020-01-30 PROCEDURE — 3008F BODY MASS INDEX DOCD: CPT | Mod: S$GLB,,, | Performed by: PHYSICIAN ASSISTANT

## 2020-01-30 PROCEDURE — 3074F SYST BP LT 130 MM HG: CPT | Mod: S$GLB,,, | Performed by: PHYSICIAN ASSISTANT

## 2020-01-30 PROCEDURE — 99214 PR OFFICE/OUTPT VISIT, EST, LEVL IV, 30-39 MIN: ICD-10-PCS | Mod: S$GLB,,, | Performed by: PHYSICIAN ASSISTANT

## 2020-01-30 PROCEDURE — 99214 OFFICE O/P EST MOD 30 MIN: CPT | Mod: S$GLB,,, | Performed by: PHYSICIAN ASSISTANT

## 2020-01-30 RX ORDER — LOSARTAN POTASSIUM 25 MG/1
50 TABLET ORAL DAILY
Qty: 180 TABLET | Refills: 1 | Status: SHIPPED | OUTPATIENT
Start: 2020-01-30 | End: 2020-07-28

## 2020-01-30 RX ORDER — GABAPENTIN 300 MG/1
900 CAPSULE ORAL 3 TIMES DAILY
Qty: 810 CAPSULE | Refills: 1 | Status: SHIPPED | OUTPATIENT
Start: 2020-01-30 | End: 2020-08-06 | Stop reason: SDUPTHER

## 2020-01-30 RX ORDER — LOSARTAN POTASSIUM 25 MG/1
50 TABLET ORAL DAILY
COMMUNITY
Start: 2020-01-20 | End: 2020-01-30 | Stop reason: SDUPTHER

## 2020-01-30 RX ORDER — AMLODIPINE BESYLATE 10 MG/1
10 TABLET ORAL DAILY
Qty: 90 TABLET | Refills: 1 | Status: SHIPPED | OUTPATIENT
Start: 2020-01-30 | End: 2020-07-31 | Stop reason: SDUPTHER

## 2020-01-30 RX ORDER — HYDROCODONE BITARTRATE AND ACETAMINOPHEN 10; 325 MG/1; MG/1
1 TABLET ORAL EVERY 8 HOURS PRN
Qty: 90 TABLET | Refills: 0 | Status: SHIPPED | OUTPATIENT
Start: 2020-04-13 | End: 2020-05-11 | Stop reason: SDUPTHER

## 2020-01-30 RX ORDER — HYDROCODONE BITARTRATE AND ACETAMINOPHEN 10; 325 MG/1; MG/1
1 TABLET ORAL EVERY 8 HOURS PRN
Qty: 90 TABLET | Refills: 0 | Status: SHIPPED | OUTPATIENT
Start: 2020-03-13 | End: 2020-04-12

## 2020-01-30 RX ORDER — ATORVASTATIN CALCIUM 40 MG/1
40 TABLET, FILM COATED ORAL DAILY
Qty: 90 TABLET | Refills: 1 | Status: SHIPPED | OUTPATIENT
Start: 2020-01-30 | End: 2020-08-06 | Stop reason: SDUPTHER

## 2020-01-30 RX ORDER — PANTOPRAZOLE SODIUM 40 MG/1
40 TABLET, DELAYED RELEASE ORAL DAILY
Qty: 90 TABLET | Refills: 1 | Status: SHIPPED | OUTPATIENT
Start: 2020-01-30 | End: 2020-08-06 | Stop reason: SDUPTHER

## 2020-01-30 RX ORDER — ALPRAZOLAM 1 MG/1
1 TABLET ORAL DAILY
Qty: 30 TABLET | Refills: 2 | Status: SHIPPED | OUTPATIENT
Start: 2020-01-30 | End: 2020-04-30 | Stop reason: SDUPTHER

## 2020-01-30 RX ORDER — HYDROCODONE BITARTRATE AND ACETAMINOPHEN 10; 325 MG/1; MG/1
1 TABLET ORAL EVERY 8 HOURS PRN
Qty: 90 TABLET | Refills: 0 | Status: SHIPPED | OUTPATIENT
Start: 2020-02-13 | End: 2020-03-14

## 2020-01-30 NOTE — PATIENT INSTRUCTIONS
Back Pain (Acute or Chronic)    Back pain is one of the most common problems. The good news is that most people feel better in 1 to 2 weeks, and most of the rest in 1 to 2 months. Most people can remain active.  People experience and describe pain differently; not everyone is the same.  · The pain can be sharp, stabbing, shooting, aching, cramping or burning.  · Movement, standing, bending, lifting, sitting, or walking may worsen pain.  · It can be localized to one spot or area, or it can be more generalized.  · It can spread or radiate upwards, to the front, or go down your arms or legs (sciatica).  · It can cause muscle spasm.  Most of the time, mechanical problems with the muscles or spine cause the pain. Mechanical problems are usually caused by an injury to the muscles or ligaments. While illness can cause back pain, it is usually not caused by a serious illness. Mechanical problems include:   · Physical activity such as sports, exercise, work, or normal activity  · Overexertion, lifting, pushing, pulling incorrectly or too aggressively  · Sudden twisting, bending, or stretching from an accident, or accidental movement  · Poor posture  · Stretching or moving wrong, without noticing pain at the time  · Poor coordination, lack of regular exercise (check with your doctor about this)  · Spinal disc disease or arthritis  · Stress  Pain can also be related to pregnancy, or illness like appendicitis, bladder or kidney infections, pelvic infections, and many other things.  Acute back pain usually gets better in 1 to 2 weeks. Back pain related to disk disease, arthritis in the spinal joints or spinal stenosis (narrowing of the spinal canal) can become chronic and last for months or years.  Unless you had a physical injury (for example, a car accident or fall) X-rays are usually not needed for the initial evaluation of back pain. If pain continues and does not respond to medical treatment, X-rays and other tests may be  needed.  Home care  Try these home care recommendations:  · When in bed, try to find a position of comfort. A firm mattress is best. Try lying flat on your back with pillows under your knees. You can also try lying on your side with your knees bent up towards your chest and a pillow between your knees.  · At first, do not try to stretch out the sore spots. If there is a strain, it is not like the good soreness you get after exercising without an injury. In this case, stretching may make it worse.  · Avoid prolong sitting, long car rides, or travel. This puts more stress on the lower back than standing or walking.  · During the first 24 to 72 hours after an acute injury or flare up of chronic back pain, apply an ice pack to the painful area for 20 minutes and then remove it for 20 minutes. Do this over a period of 60 to 90 minutes or several times a day. This will reduce swelling and pain. Wrap the ice pack in a thin towel or plastic to protect your skin.  · You can start with ice, then switch to heat. Heat (hot shower, hot bath, or heating pad) reduces pain and works well for muscle spasms. Heat can be applied to the painful area for 20 minutes then remove it for 20 minutes. Do this over a period of 60 to 90 minutes or several times a day. Do not sleep on a heating pad. It can lead to skin burns or tissue damage.  · You can alternate ice and heat therapy. Talk with your doctor about the best treatment for your back pain.  · Therapeutic massage can help relax the back muscles without stretching them.  · Be aware of safe lifting methods and do not lift anything without stretching first.  Medicines  Talk to your doctor before using medicine, especially if you have other medical problems or are taking other medicines.  · You may use over-the-counter medicine as directed on the bottle to control pain, unless another pain medicine was prescribed. If you have chronic conditions like diabetes, liver or kidney disease,  stomach ulcers, or gastrointestinal bleeding, or are taking blood thinners, talk to your doctor before taking any medicine.  · Be careful if you are given a prescription medicines, narcotics, or medicine for muscle spasms. They can cause drowsiness, affect your coordination, reflexes, and judgement. Do not drive or operate heavy machinery.  Follow-up care  Follow up with your healthcare provider, or as advised.   A radiologist will review any X-rays that were taken. Your provide will notify you of any new findings that may affect your care.  Call 911  Call emergency services if any of the following occur:  · Trouble breathing  · Confusion  · Very drowsy or trouble awakening  · Fainting or loss of consciousness  · Rapid or very slow heart rate  · Loss of bowel or bladder control  When to seek medical advice  Call your healthcare provider right away if any of these occur:   · Pain becomes worse or spreads to your legs  · Weakness or numbness in one or both legs  · Numbness in the groin or genital area  Date Last Reviewed: 7/1/2016  © 6145-3272 [employer], [employer] [address] [place] [address], [place] [address]. All rights reserved. This information is not intended as a substitute for professional medical care. Always follow your healthcare professional's instructions.

## 2020-01-30 NOTE — PROGRESS NOTES
SUBJECTIVE:    Patient ID: Patrick Wilson is a 59 y.o. male.    Chief Complaint: Follow-up (6 month follow up.....mlr) and Medication Refill (Med bottles present and reconciled....pharmacy verified.....mlr)    59-year-old white male presents for regular checkup and refills.  He reports that he has been doing pretty well overall.  Still suffers from chronic back and neck pain and managed for his pain with Dr. Chacon. Dealing with most of the pain. RT shoulder has flared up on him recently as well. Straining to use the arm. Planning to go on cruise upcoming.      No visits with results within 6 Month(s) from this visit.   Latest known visit with results is:   Office Visit on 05/27/2019   Component Date Value Ref Range Status    Color, UA 05/27/2019 yellow   Final    Spec Grav UA 05/27/2019 1.025   Final    pH, UA 05/27/2019 5.5   Final    WBC, UA 05/27/2019 neg   Final    Nitrite, UA 05/27/2019 neg   Final    Protein 05/27/2019 neg   Final    Glucose, UA 05/27/2019 neg   Final    Ketones, UA 05/27/2019 neg   Final    Urobilinogen, UA 05/27/2019 0.2   Final    Bilirubin 05/27/2019 neg   Final    Blood, UA 05/27/2019 neg   Final       Past Medical History:   Diagnosis Date    Anticoagulant long-term use     Anxiety     Arthritis     Back pain     Cerebral palsy     Depression     GERD (gastroesophageal reflux disease)     Hypertension     Peptic ulcer of stomach     Seizures     AS A CHILD    Wears glasses     CONTACS     Past Surgical History:   Procedure Laterality Date    ANKLE SURGERY Left     BACK SURGERY      CYSTOSCOPY N/A 7/17/2018    Procedure: CYSTOSCOPY;  Surgeon: Jonathan Jacinto MD;  Location: [facility] OR;  Service: Urology;  Laterality: N/A;    CYSTOSCOPY WITH INSERTION OF MINIMALLY INVASIVE IMPLANT TO ENLARGE PROSTATIC URETHRA N/A 8/2/2018    Procedure: CYSTOSCOPY, WITH INSERTION OF UROLIFT IMPLANT;  Surgeon: Jonathan Jacinto MD;  Location: [facility] OR;  Service: Urology;   Laterality: N/A;    LEG SURGERY Left     SHOULDER SURGERY Right     TRANSRECTAL ULTRASOUND EXAMINATION N/A 7/17/2018    Procedure: ULTRASOUND, TRANSRECTAL;  Surgeon: Jonathan Jacinto MD;  Location: [facility] OR;  Service: Urology;  Laterality: N/A;     History reviewed. No pertinent family history.    Marital Status:   Alcohol History:  reports that he drinks alcohol.  Tobacco History:  reports that he has been smoking pipe. He has never used smokeless tobacco.  Drug History:  reports that he does not use drugs.    Review of patient's allergies indicates:   Allergen Reactions    Tylox [oxycodone-acetaminophen] Nausea And Vomiting       Current Outpatient Medications:     ALPRAZolam (XANAX) 1 MG tablet, Take 1 tablet (1 mg total) by mouth once daily., Disp: 30 tablet, Rfl: 2    amLODIPine (NORVASC) 10 MG tablet, Take 1 tablet (10 mg total) by mouth once daily., Disp: 90 tablet, Rfl: 1    aspirin (ECOTRIN) 81 MG EC tablet, Take 81 mg by mouth once daily., Disp: , Rfl:     atorvastatin (LIPITOR) 40 MG tablet, Take 1 tablet (40 mg total) by mouth once daily., Disp: 90 tablet, Rfl: 1    cyclobenzaprine (FLEXERIL) 5 MG tablet, Take 5 mg by mouth 3 (three) times daily as needed. , Disp: , Rfl:     duloxetine (CYMBALTA) 60 MG capsule, Take 60 mg by mouth once daily., Disp: , Rfl:     fluticasone (FLONASE) 50 mcg/actuation nasal spray, 1 spray by Each Nostril route as needed. , Disp: , Rfl:     gabapentin (NEURONTIN) 300 MG capsule, Take 3 capsules (900 mg total) by mouth 3 (three) times daily., Disp: 810 capsule, Rfl: 1    hydroCHLOROthiazide (HYDRODIURIL) 25 MG tablet, Take 1 tablet (25 mg total) by mouth once daily., Disp: 90 tablet, Rfl: 1    HYDROcodone-acetaminophen (NORCO)  mg per tablet, Take 1 tablet by mouth every 8 (eight) hours as needed for Pain., Disp: 90 tablet, Rfl: 0    losartan (COZAAR) 25 MG tablet, Take 2 tablets (50 mg total) by mouth once daily., Disp: 180 tablet, Rfl: 1     "multivit-min/folic/vit K/lycop (MEN'S 50 PLUS MULTIVITAMIN ORAL), Take 1 tablet by mouth once daily., Disp: , Rfl:     pantoprazole (PROTONIX) 40 MG tablet, Take 1 tablet (40 mg total) by mouth once daily., Disp: 90 tablet, Rfl: 1    sildenafil (VIAGRA) 100 MG tablet, Take 1 tablet (100 mg total) by mouth as needed for Erectile Dysfunction., Disp: 6 tablet, Rfl: 6    Review of Systems   Constitutional: Negative for activity change, fatigue, fever and unexpected weight change.   HENT: Negative for congestion.    Respiratory: Negative for apnea, cough, chest tightness and shortness of breath.    Cardiovascular: Negative for chest pain and palpitations.   Gastrointestinal: Negative for abdominal distention and abdominal pain.   Genitourinary: Negative for difficulty urinating and dysuria.   Musculoskeletal: Positive for arthralgias, back pain (chronic) and myalgias.   Neurological: Negative for dizziness and weakness.          Objective:      Vitals:    01/30/20 0859 01/30/20 0913 01/30/20 0939   BP: (!) 118/92 (!) 118/92 128/86   Pulse: 72     Weight: 109.2 kg (240 lb 12.8 oz)     Height: 5' 7" (1.702 m)       Physical Exam   Constitutional: He is oriented to person, place, and time. He appears well-developed and well-nourished. No distress.   HENT:   Head: Normocephalic and atraumatic.   Eyes: Pupils are equal, round, and reactive to light.   Neck: Normal range of motion. Neck supple. No thyromegaly present.   Cardiovascular: Normal rate, regular rhythm, normal heart sounds and intact distal pulses.   Pulmonary/Chest: Effort normal and breath sounds normal.   Abdominal: Soft. Bowel sounds are normal. He exhibits no distension. There is no tenderness.   Musculoskeletal: Normal range of motion.   Neurological: He is alert and oriented to person, place, and time. No cranial nerve deficit.   Skin: Skin is warm and dry. No rash noted. No erythema.         Assessment:       1. Essential hypertension    2. 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 Chronic pain syndrome    4. Anxiety disorder, unspecified type    5. Gastroesophageal reflux disease, esophagitis presence not specified    6. Hyperlipidemia, unspecified hyperlipidemia type         Plan:       Essential hypertension  Comments:  Doing well. rechecked by me. refills today.  Orders:  -     losartan (COZAAR) 25 MG tablet; Take 2 tablets (50 mg total) by mouth once daily.  Dispense: 180 tablet; Refill: 1  -     amLODIPine (NORVASC) 10 MG tablet; Take 1 tablet (10 mg total) by mouth once daily.  Dispense: 90 tablet; Refill: 1    BPH with obstruction/lower urinary tract symptoms  Comments:  stable, still follows with Dr. Jacinto. Had urolift procedure done and says that sxs did improve.    Chronic pain syndrome  Comments:  Managed pretty well. Refills today through Dr. Chacon  Orders:  -     gabapentin (NEURONTIN) 300 MG capsule; Take 3 capsules (900 mg total) by mouth 3 (three) times daily.  Dispense: 810 capsule; Refill: 1    Anxiety disorder, unspecified type  Comments:  mood and anxiety appear to be stable at this time. continue as is.  Orders:  -     ALPRAZolam (XANAX) 1 MG tablet; Take 1 tablet (1 mg total) by mouth once daily.  Dispense: 30 tablet; Refill: 2    Gastroesophageal reflux disease, esophagitis presence not specified  -     pantoprazole (PROTONIX) 40 MG tablet; Take 1 tablet (40 mg total) by mouth once daily.  Dispense: 90 tablet; Refill: 1    Hyperlipidemia, unspecified hyperlipidemia type  -     atorvastatin (LIPITOR) 40 MG tablet; Take 1 tablet (40 mg total) by mouth once daily.  Dispense: 90 tablet; Refill: 1      Follow up in about 3 months (around 4/30/2020) for BP Check-Up.        1/30/2020 Yaya Nick PA-C

## 2020-01-31 LAB
ALBUMIN SERPL-MCNC: 4.7 G/DL (ref 3.6–5.1)
ALBUMIN/GLOB SERPL: 1.8 (CALC) (ref 1–2.5)
ALP SERPL-CCNC: 164 U/L (ref 40–115)
ALT SERPL-CCNC: 62 U/L (ref 9–46)
APPEARANCE UR: CLEAR
AST SERPL-CCNC: 21 U/L (ref 10–35)
BACTERIA #/AREA URNS HPF: NORMAL /HPF
BACTERIA UR CULT: NORMAL
BASOPHILS # BLD AUTO: 50 CELLS/UL (ref 0–200)
BASOPHILS NFR BLD AUTO: 0.6 %
BILIRUB SERPL-MCNC: 0.6 MG/DL (ref 0.2–1.2)
BILIRUB UR QL STRIP: NEGATIVE
BUN SERPL-MCNC: 12 MG/DL (ref 7–25)
BUN/CREAT SERPL: ABNORMAL (CALC) (ref 6–22)
CALCIUM SERPL-MCNC: 9.9 MG/DL (ref 8.6–10.3)
CHLORIDE SERPL-SCNC: 99 MMOL/L (ref 98–110)
CHOLEST SERPL-MCNC: 196 MG/DL
CHOLEST/HDLC SERPL: 4 (CALC)
CO2 SERPL-SCNC: 27 MMOL/L (ref 20–32)
COLOR UR: YELLOW
CREAT SERPL-MCNC: 0.87 MG/DL (ref 0.7–1.33)
EOSINOPHIL # BLD AUTO: 59 CELLS/UL (ref 15–500)
EOSINOPHIL NFR BLD AUTO: 0.7 %
ERYTHROCYTE [DISTWIDTH] IN BLOOD BY AUTOMATED COUNT: 12.7 % (ref 11–15)
GFRSERPLBLD MDRD-ARVRAT: 96 ML/MIN/1.73M2
GLOBULIN SER CALC-MCNC: 2.6 G/DL (CALC) (ref 1.9–3.7)
GLUCOSE SERPL-MCNC: 99 MG/DL (ref 65–99)
GLUCOSE UR QL STRIP: NEGATIVE
HCT VFR BLD AUTO: 53.1 % (ref 38.5–50)
HDLC SERPL-MCNC: 49 MG/DL
HGB BLD-MCNC: 17.3 G/DL (ref 13.2–17.1)
HGB UR QL STRIP: NEGATIVE
HYALINE CASTS #/AREA URNS LPF: NORMAL /LPF
KETONES UR QL STRIP: NEGATIVE
LDLC SERPL CALC-MCNC: 122 MG/DL (CALC)
LEUKOCYTE ESTERASE UR QL STRIP: NEGATIVE
LYMPHOCYTES # BLD AUTO: 1831 CELLS/UL (ref 850–3900)
LYMPHOCYTES NFR BLD AUTO: 21.8 %
MCH RBC QN AUTO: 27.9 PG (ref 27–33)
MCHC RBC AUTO-ENTMCNC: 32.6 G/DL (ref 32–36)
MCV RBC AUTO: 85.5 FL (ref 80–100)
MONOCYTES # BLD AUTO: 722 CELLS/UL (ref 200–950)
MONOCYTES NFR BLD AUTO: 8.6 %
NEUTROPHILS # BLD AUTO: 5737 CELLS/UL (ref 1500–7800)
NEUTROPHILS NFR BLD AUTO: 68.3 %
NITRITE UR QL STRIP: NEGATIVE
NONHDLC SERPL-MCNC: 147 MG/DL (CALC)
PH UR STRIP: 7.5 [PH] (ref 5–8)
PLATELET # BLD AUTO: 334 THOUSAND/UL (ref 140–400)
PMV BLD REES-ECKER: 10.4 FL (ref 7.5–12.5)
POTASSIUM SERPL-SCNC: 4.3 MMOL/L (ref 3.5–5.3)
PROT SERPL-MCNC: 7.3 G/DL (ref 6.1–8.1)
PROT UR QL STRIP: NEGATIVE
PSA SERPL-MCNC: 2.5 NG/ML
RBC # BLD AUTO: 6.21 MILLION/UL (ref 4.2–5.8)
RBC #/AREA URNS HPF: NORMAL /HPF
SODIUM SERPL-SCNC: 138 MMOL/L (ref 135–146)
SP GR UR STRIP: 1.01 (ref 1–1.03)
SQUAMOUS #/AREA URNS HPF: NORMAL /HPF
TRIGL SERPL-MCNC: 139 MG/DL
TSH SERPL-ACNC: 1.89 MIU/L (ref 0.4–4.5)
WBC # BLD AUTO: 8.4 THOUSAND/UL (ref 3.8–10.8)
WBC #/AREA URNS HPF: NORMAL /HPF

## 2020-02-03 ENCOUNTER — TELEPHONE (OUTPATIENT)
Dept: FAMILY MEDICINE | Facility: CLINIC | Age: 60
End: 2020-02-03

## 2020-02-03 NOTE — TELEPHONE ENCOUNTER
----- Message from Yaya Nick PA-C sent at 2/2/2020  6:45 PM CST -----  Labs overall look pretty stable. Liver enzymes have been mildly elevated for over 14 years now. Will continue to monitor.

## 2020-02-19 RX ORDER — DULOXETIN HYDROCHLORIDE 60 MG/1
60 CAPSULE, DELAYED RELEASE ORAL DAILY
Qty: 90 CAPSULE | Refills: 1 | Status: SHIPPED | OUTPATIENT
Start: 2020-02-19 | End: 2020-08-06 | Stop reason: SDUPTHER

## 2020-02-19 NOTE — TELEPHONE ENCOUNTER
----- Message from Jamie Salcido sent at 2/19/2020 10:27 AM CST -----  Refills on cymbalta   [facility]   Pt [phone]

## 2020-04-30 ENCOUNTER — OFFICE VISIT (OUTPATIENT)
Dept: FAMILY MEDICINE | Facility: CLINIC | Age: 60
End: 2020-04-30
Payer: MEDICARE

## 2020-04-30 DIAGNOSIS — F41.9 ANXIETY DISORDER, UNSPECIFIED TYPE: ICD-10-CM

## 2020-04-30 DIAGNOSIS — G89.4 CHRONIC PAIN SYNDROME: Primary | ICD-10-CM

## 2020-04-30 DIAGNOSIS — I10 ESSENTIAL HYPERTENSION: ICD-10-CM

## 2020-04-30 DIAGNOSIS — N40.1 BPH WITH OBSTRUCTION/LOWER URINARY TRACT SYMPTOMS: ICD-10-CM

## 2020-04-30 DIAGNOSIS — N13.8 BPH WITH OBSTRUCTION/LOWER URINARY TRACT SYMPTOMS: ICD-10-CM

## 2020-04-30 PROCEDURE — 99214 OFFICE O/P EST MOD 30 MIN: CPT | Mod: 95,,, | Performed by: PHYSICIAN ASSISTANT

## 2020-04-30 PROCEDURE — 99214 PR OFFICE/OUTPT VISIT, EST, LEVL IV, 30-39 MIN: ICD-10-PCS | Mod: 95,,, | Performed by: PHYSICIAN ASSISTANT

## 2020-04-30 RX ORDER — ALPRAZOLAM 1 MG/1
1 TABLET ORAL DAILY
Qty: 30 TABLET | Refills: 2 | Status: SHIPPED | OUTPATIENT
Start: 2020-04-30 | End: 2020-08-06 | Stop reason: SDUPTHER

## 2020-04-30 NOTE — PROGRESS NOTES
Subjective:        The chief complaint leading to consultation is: 3month checkup  The patient location is:  Home  Visit type: Virtual visit with synchronous audio/video or audio only  This was a video visit in lieu of in-person visit due to the coronavirus emergency. Patient acknowledged and consented to the video visit encounter.     58 yo wm presents for regular checkup vis virtual telemedicine conference.  He reports that he has overall been doing very well.  Keeping himself isolated at home for much of the past few months.  He does have occasional seasonal allergies that flare up and he is just taking over-the-counter medication.  He suffers from chronic back and neck pain multiple back surgeries in the past.  He maintains with hydrocodone at the current dose.  This will be due in 2 weeks to refill he also uses alprazolam when needed for anxiety.  Aside from alprazolam he denies needing any other refills at the moment in he had full blood work done in January.  This revealed just a stable elevation in his liver enzymes that has been present for the past 14 years.      Past Surgical History:   Procedure Laterality Date    ANKLE SURGERY Left     BACK SURGERY      CYSTOSCOPY N/A 7/17/2018    Procedure: CYSTOSCOPY;  Surgeon: Jonathan Jacinto MD;  [facility]  Service: Urology;  Laterality: N/A;    CYSTOSCOPY WITH INSERTION OF MINIMALLY INVASIVE IMPLANT TO ENLARGE PROSTATIC URETHRA N/A 8/2/2018    Procedure: CYSTOSCOPY, WITH INSERTION OF UROLIFT IMPLANT;  Surgeon: Jonathan Jacinto MD;  Location[facility] OR;  Service: Urology;  Laterality: N/A;    LEG SURGERY Left     SHOULDER SURGERY Right     TRANSRECTAL ULTRASOUND EXAMINATION N/A 7/17/2018    Procedure: ULTRASOUND, TRANSRECTAL;  Surgeon: Jonathan Jacinto MD;  [facility];  Service: Urology;  Laterality: N/A;     Past Medical History:   Diagnosis Date    Anticoagulant long-term use     Anxiety     Arthritis     Back pain     Cerebral  palsy     Depression     GERD (gastroesophageal reflux disease)     Hypertension     Peptic ulcer of stomach     Seizures     AS A CHILD    Wears glasses     CONTACS     History reviewed. No pertinent family history.     Social History:   Marital Status:   Alcohol History:  reports that he drinks alcohol.  Tobacco History:  reports that he has been smoking pipe. He has never used smokeless tobacco.  Drug History:  reports that he does not use drugs.    Review of patient's allergies indicates:   Allergen Reactions    Tylox [oxycodone-acetaminophen] Nausea And Vomiting       Current Outpatient Medications   Medication Sig Dispense Refill    ALPRAZolam (XANAX) 1 MG tablet Take 1 tablet (1 mg total) by mouth once daily. 30 tablet 2    amLODIPine (NORVASC) 10 MG tablet Take 1 tablet (10 mg total) by mouth once daily. 90 tablet 1    aspirin (ECOTRIN) 81 MG EC tablet Take 81 mg by mouth once daily.      atorvastatin (LIPITOR) 40 MG tablet Take 1 tablet (40 mg total) by mouth once daily. 90 tablet 1    cyclobenzaprine (FLEXERIL) 5 MG tablet Take 5 mg by mouth 3 (three) times daily as needed.       DULoxetine (CYMBALTA) 60 MG capsule Take 1 capsule (60 mg total) by mouth once daily. 90 capsule 1    fluticasone (FLONASE) 50 mcg/actuation nasal spray 1 spray by Each Nostril route as needed.       gabapentin (NEURONTIN) 300 MG capsule Take 3 capsules (900 mg total) by mouth 3 (three) times daily. 810 capsule 1    hydroCHLOROthiazide (HYDRODIURIL) 25 MG tablet Take 1 tablet (25 mg total) by mouth once daily. 90 tablet 1    HYDROcodone-acetaminophen (NORCO)  mg per tablet Take 1 tablet by mouth every 8 (eight) hours as needed for Pain. 90 tablet 0    losartan (COZAAR) 25 MG tablet Take 2 tablets (50 mg total) by mouth once daily. 180 tablet 1    multivit-min/folic/vit K/lycop (MEN'S 50 PLUS MULTIVITAMIN ORAL) Take 1 tablet by mouth once daily.      pantoprazole (PROTONIX) 40 MG tablet Take 1  "tablet (40 mg total) by mouth once daily. 90 tablet 1    sildenafil (VIAGRA) 100 MG tablet Take 1 tablet (100 mg total) by mouth as needed for Erectile Dysfunction. 6 tablet 6     No current facility-administered medications for this visit.        Review of Systems   Constitutional: Negative for activity change, fatigue, fever and unexpected weight change.        Pretty sedentary lifestyle   HENT: Negative for congestion.    Respiratory: Negative for apnea, cough, chest tightness and shortness of breath.    Cardiovascular: Negative for chest pain and palpitations.   Gastrointestinal: Negative for abdominal distention and abdominal pain.   Genitourinary: Negative for difficulty urinating and dysuria.   Musculoskeletal: Negative for arthralgias and back pain.   Neurological: Negative for dizziness and weakness.         Objective:        Physical Exam:   Physical Exam   Constitutional: He appears well-developed and well-nourished. No distress.   HENT:   Head: Normocephalic and atraumatic.   Neck: Normal range of motion.   Pulmonary/Chest: Effort normal. No respiratory distress.            Assessment:       1. Chronic pain syndrome    2. Anxiety disorder, unspecified type    3. Essential hypertension    4. BPH with obstruction/lower urinary tract symptoms      Plan:   Chronic pain syndrome  Comments:  currently decently managed per DR. Chacon. will need refills loaded for 5/13    Anxiety disorder, unspecified type  Comments:  mood and anxiety appear to be stable at this time. continue as is.  Orders:  -     ALPRAZolam (XANAX) 1 MG tablet; Take 1 tablet (1 mg total) by mouth once daily.  Dispense: 30 tablet; Refill: 2    Essential hypertension  Comments:  Reports decent control at home. Unable to give me a reading today since his "machine is acting up". PLan to meet in person in 3 mos.    BPH with obstruction/lower urinary tract symptoms      Follow up in about 3 months (around 7/30/2020) for BP Check-Up.    Total time " spent with patient: 20min    Each patient to whom he or she provides medical services by telemedicine is:  (1) informed of the relationship between the physician and patient and the respective role of any other health care provider with respect to management of the patient; and (2) notified that he or she may decline to receive medical services by telemedicine and may withdraw from such care at any time.    This note was created using [employer] voice recognition software that occasionally misinterprets phrases or words.

## 2020-04-30 NOTE — PATIENT INSTRUCTIONS
Treating Anxiety Disorders with Medicine  An anxiety disorder can make you feel nervous or apprehensive, even without a clear reason. In people age 65 and older, generalized anxiety disorder is one of the most commonly diagnosed anxiety disorders. Many times it occurs with depression. Certain anxiety disorders can cause intense feelings of fear or panic. You may even have physical symptoms such as a racing heartbeat, sweating, or dizziness. If you have these feelings, you dont have to suffer anymore. Treatment to help you overcome your fears will likely include therapy (also called counseling). Medicine may also be prescribed to help control your symptoms.    Medicines  Certain medicines may be prescribed to help control your symptoms. So you may feel less anxious. You may also feel able to move forward with therapy. At first, medicines and dosages may need to be adjusted to find what works best for you. Try to be patient. Tell your healthcare provider how a medicine makes you feel. This way, you can work together to find the treatment thats best for you. Keep in mind that medicines can have side effects. Talk with your provider about any side effects that are bothering you. Changing the dose or type of medicine may help. Dont stop taking medicine on your own. That can cause symptoms to come back.  · Anti-anxiety medicine. This medicine eases symptoms and helps you relax. Your healthcare provider will explain when and how to use it. It may be prescribed for use before situations that make you anxious. You may also be told to take medicine on a regular schedule. Anti-anxiety medicine may make you feel a little sleepy or out of it. Dont drive a car or operate machinery while on this medicine, until you know how it affects you.  Caution  Never use alcohol or other drugs with anti-anxiety medicines. This could result in loss of muscular control, sedation, coma, or death. Also, use only the amount of medicine  prescribed for you. If you think you may have taken too much, get emergency care right away.   · Antidepressant medicine. This kind of medicine is often used to treat anxiety, even if you arent depressed. An antidepressant helps balance out brain chemicals. This helps keep anxiety under control. This medicine is taken on a schedule. It takes a few weeks to start working. If you dont notice a change at first, you may just need more time. But if you dont notice results after the first few weeks, tell your provider.  Keep taking medicines as prescribed  Never change your dosage, share or use another person's medicine, or stop taking your medicines without talking to your healthcare provider first. Keep the following in mind:  · Some medicines must be taken on a schedule. Make this part of your daily routine. For instance, always take your pill before brushing your teeth. A pillbox can help you remember if youve taken your medicine each day.  · Medicines are often taken for 6 to 12 months. Your healthcare provider will then evaluate whether you need to stay on them. Many people who have also had therapy may no longer need medicine to manage anxiety.  · You may need to stop taking medicine slowly to give your body time to adjust. When its time to stop, your healthcare provider will tell you more. Remember: Never stop taking your medicine without talking to your provider first.  · If symptoms return, you may need to start taking medicines again. This isnt your fault. Its just the nature of your anxiety disorder.  Special concerns  · Side effects. Medicines may cause side effects. Ask your healthcare provider or pharmacist what you can expect. They may have ideas for avoiding some side effects.  · Sexual problems. Some antidepressants can affect your desire for sex or your ability to have an orgasm. A change in dosage or medicine often solves the problem. If you have a sexual side effect that concerns you, tell your  healthcare provider.  · Addiction. If youve never had a problem with drugs or alcohol, you may not have a problem with medicines used to treat anxiety disorders. But always discuss the medicines with your healthcare provider before taking them. If you have a history of addiction, you may not be able to use certain medicines used to treat anxiety disorders.  · Medicine interactions. Always check with your pharmacist before using any over-the-counter medicines, including herbal supplements.   Date Last Reviewed: 5/1/2017 © 2000-2017 [employer]. [address] [place] [id number]. All rights reserved. This information is not intended as a substitute for professional medical care. Always follow your healthcare professional's instructions.        Taking Your Blood Pressure  Blood pressure is the force of blood against the artery wall as it moves from the heart through the blood vessels. You can take your own blood pressure reading using a digital monitor. Take your readings the same each time, using the same arm. Take readings as often as your healthcare provider instructs.  About blood pressure monitors  Blood pressure monitors are designed for certain ages and cases. You can find monitors for older adults, for pregnant women, and for children. Make sure the one you choose is the right one for your age and situation.  The American Heart Association recommends an automatic cuff monitor that fits on your upper arm (bicep). The cuff should fit your arm size. A cuff thats too large or too small will not give an accurate reading. Measure around your upper arm to find your size.  Monitors that attach to your finger or wrist are not as accurate as monitors for your upper arm.  Ask your healthcare provider for help in choosing a monitor. Bring your monitor to your next provider visit if you need help in using it the correct way.  The steps below are general instructions for using an automatic digital  monitor.  Step 1. Relax    · Take your blood pressure at the same time every day, such as in the morning or evening, or at the time your healthcare provider recommends.  · Wait at least a half-hour after smoking, eating, or exercising. Don't drink coffee, tea, soda, or other caffeinated beverages before checking your blood pressure.  · Sit comfortably at a table with both feet on the floor. Do not cross your legs or feet. Place the monitor near you.  · Rest for a few minutes before you begin.  Step 2. Wrap the cuff    · Place your arm on the table, palm up. Your arm should be at the level of your heart. Wrap the cuff around your upper arm, just above your elbow. Its best done on bare skin, not over clothing. Most cuffs will indicate where the brachial artery (the blood vessel in the middle of the arm at the inner side of the elbow) should line up with the cuff. Look in your monitor's instruction booklet for an illustration. You can also bring your cuff to your healthcare provider and have them show you how to correctly place the cuff.  Step 3. Inflate the cuff    · Push the button that starts the pump.  · The cuff will tighten, then loosen.  · The numbers will change. When they stop changing, your blood pressure reading will appear.  · Take 2 or 3 readings one minute apart.  Step 4. Write down the results of each reading    · Write down your blood pressure numbers for each reading. Note the date and time. Keep your results in one place, such as a notebook. Even if your monitor has a built-in memory, keep a hard copy of the readings.  · Remove the cuff from your arm. Turn off the machine.  · Bring your blood pressure records with your healthcare providers at each visit.  · If you start a new blood pressure medicine, note the day you started the new medicine. Also note the day if you change the dose of your medicine. This information goes on your blood pressure recording sheet. This will help your healthcare provider  monitor how well the medicine changes are working.  · Ask your healthcare provider what numbers should prompt you to call him or her. Also ask what numbers should prompt you to get help right away.  Date Last Reviewed: 11/1/2016  © 4669-7495 [employer]. [address] [place] [address], [place] [address]. All rights reserved. This information is not intended as a substitute for professional medical care. Always follow your healthcare professional's instructions.

## 2020-05-11 ENCOUNTER — PATIENT MESSAGE (OUTPATIENT)
Dept: FAMILY MEDICINE | Facility: CLINIC | Age: 60
End: 2020-05-11

## 2020-05-11 DIAGNOSIS — G89.4 CHRONIC PAIN SYNDROME: ICD-10-CM

## 2020-05-11 RX ORDER — HYDROCODONE BITARTRATE AND ACETAMINOPHEN 10; 325 MG/1; MG/1
1 TABLET ORAL EVERY 8 HOURS PRN
Qty: 90 TABLET | Refills: 0 | Status: SHIPPED | OUTPATIENT
Start: 2020-05-13 | End: 2020-06-12

## 2020-06-16 ENCOUNTER — PATIENT MESSAGE (OUTPATIENT)
Dept: FAMILY MEDICINE | Facility: CLINIC | Age: 60
End: 2020-06-16

## 2020-06-16 RX ORDER — HYDROCODONE BITARTRATE AND ACETAMINOPHEN 10; 325 MG/1; MG/1
1 TABLET ORAL EVERY 8 HOURS PRN
Qty: 90 TABLET | Refills: 0 | Status: SHIPPED | OUTPATIENT
Start: 2020-06-16 | End: 2020-07-20 | Stop reason: SDUPTHER

## 2020-07-20 RX ORDER — HYDROCODONE BITARTRATE AND ACETAMINOPHEN 10; 325 MG/1; MG/1
1 TABLET ORAL EVERY 8 HOURS PRN
Qty: 90 TABLET | Refills: 0 | Status: SHIPPED | OUTPATIENT
Start: 2020-07-20 | End: 2020-07-21 | Stop reason: SDUPTHER

## 2020-07-21 RX ORDER — HYDROCODONE BITARTRATE AND ACETAMINOPHEN 10; 325 MG/1; MG/1
1 TABLET ORAL EVERY 8 HOURS PRN
Qty: 90 TABLET | Refills: 0 | Status: SHIPPED | OUTPATIENT
Start: 2020-07-21 | End: 2020-09-17 | Stop reason: SDUPTHER

## 2020-07-21 NOTE — TELEPHONE ENCOUNTER
----- Message from Oliver Lainez sent at 7/21/2020  8:56 AM CDT -----  Regarding: Refill  Contact: Patrick Wilson  Pt needs refill on Hydrocodone  Send to medicine [facility]   Pt# [phone]

## 2020-07-30 DIAGNOSIS — I10 ESSENTIAL HYPERTENSION: ICD-10-CM

## 2020-07-30 NOTE — TELEPHONE ENCOUNTER
----- Message from Mary Anne Glez sent at 7/30/2020  9:35 AM CDT -----  Wife calling for refills on Amlodipine 10 mg and Hydrochlorothiazide 25 mg [phone]

## 2020-07-31 RX ORDER — HYDROCHLOROTHIAZIDE 25 MG/1
25 TABLET ORAL DAILY
Qty: 30 TABLET | Refills: 0 | Status: SHIPPED | OUTPATIENT
Start: 2020-07-31 | End: 2020-08-06 | Stop reason: SDUPTHER

## 2020-07-31 RX ORDER — AMLODIPINE BESYLATE 10 MG/1
10 TABLET ORAL DAILY
Qty: 30 TABLET | Refills: 0 | Status: SHIPPED | OUTPATIENT
Start: 2020-07-31 | End: 2020-08-06 | Stop reason: SDUPTHER

## 2020-08-06 ENCOUNTER — OFFICE VISIT (OUTPATIENT)
Dept: FAMILY MEDICINE | Facility: CLINIC | Age: 60
End: 2020-08-06
Payer: MEDICARE

## 2020-08-06 VITALS
SYSTOLIC BLOOD PRESSURE: 132 MMHG | HEART RATE: 74 BPM | BODY MASS INDEX: 36.1 KG/M2 | DIASTOLIC BLOOD PRESSURE: 80 MMHG | HEIGHT: 67 IN | WEIGHT: 230 LBS | TEMPERATURE: 98 F

## 2020-08-06 DIAGNOSIS — K21.9 GASTROESOPHAGEAL REFLUX DISEASE, ESOPHAGITIS PRESENCE NOT SPECIFIED: ICD-10-CM

## 2020-08-06 DIAGNOSIS — G89.4 CHRONIC PAIN SYNDROME: ICD-10-CM

## 2020-08-06 DIAGNOSIS — F41.9 ANXIETY DISORDER, UNSPECIFIED TYPE: ICD-10-CM

## 2020-08-06 DIAGNOSIS — I10 ESSENTIAL HYPERTENSION: ICD-10-CM

## 2020-08-06 DIAGNOSIS — E78.5 HYPERLIPIDEMIA, UNSPECIFIED HYPERLIPIDEMIA TYPE: ICD-10-CM

## 2020-08-06 PROCEDURE — 3079F PR MOST RECENT DIASTOLIC BLOOD PRESSURE 80-89 MM HG: ICD-10-PCS | Mod: S$GLB,,, | Performed by: PHYSICIAN ASSISTANT

## 2020-08-06 PROCEDURE — 3075F SYST BP GE 130 - 139MM HG: CPT | Mod: S$GLB,,, | Performed by: PHYSICIAN ASSISTANT

## 2020-08-06 PROCEDURE — 3075F PR MOST RECENT SYSTOLIC BLOOD PRESS GE 130-139MM HG: ICD-10-PCS | Mod: S$GLB,,, | Performed by: PHYSICIAN ASSISTANT

## 2020-08-06 PROCEDURE — 3008F BODY MASS INDEX DOCD: CPT | Mod: S$GLB,,, | Performed by: PHYSICIAN ASSISTANT

## 2020-08-06 PROCEDURE — 3008F PR BODY MASS INDEX (BMI) DOCUMENTED: ICD-10-PCS | Mod: S$GLB,,, | Performed by: PHYSICIAN ASSISTANT

## 2020-08-06 PROCEDURE — 99214 PR OFFICE/OUTPT VISIT, EST, LEVL IV, 30-39 MIN: ICD-10-PCS | Mod: S$GLB,,, | Performed by: PHYSICIAN ASSISTANT

## 2020-08-06 PROCEDURE — 3079F DIAST BP 80-89 MM HG: CPT | Mod: S$GLB,,, | Performed by: PHYSICIAN ASSISTANT

## 2020-08-06 PROCEDURE — 99214 OFFICE O/P EST MOD 30 MIN: CPT | Mod: S$GLB,,, | Performed by: PHYSICIAN ASSISTANT

## 2020-08-06 RX ORDER — LOSARTAN POTASSIUM 50 MG/1
TABLET, FILM COATED ORAL
COMMUNITY
Start: 2020-06-01 | End: 2020-09-14 | Stop reason: SDUPTHER

## 2020-08-06 RX ORDER — HYDROCHLOROTHIAZIDE 25 MG/1
25 TABLET ORAL DAILY
Qty: 90 TABLET | Refills: 1 | Status: SHIPPED | OUTPATIENT
Start: 2020-08-06 | End: 2021-02-15 | Stop reason: SDUPTHER

## 2020-08-06 RX ORDER — DULOXETIN HYDROCHLORIDE 60 MG/1
60 CAPSULE, DELAYED RELEASE ORAL DAILY
Qty: 90 CAPSULE | Refills: 1 | Status: SHIPPED | OUTPATIENT
Start: 2020-08-06 | End: 2021-02-15 | Stop reason: SDUPTHER

## 2020-08-06 RX ORDER — ALPRAZOLAM 1 MG/1
1 TABLET ORAL DAILY
Qty: 90 TABLET | Refills: 1 | Status: SHIPPED | OUTPATIENT
Start: 2020-08-06 | End: 2021-02-03 | Stop reason: SDUPTHER

## 2020-08-06 RX ORDER — ATORVASTATIN CALCIUM 40 MG/1
40 TABLET, FILM COATED ORAL DAILY
Qty: 90 TABLET | Refills: 1 | Status: SHIPPED | OUTPATIENT
Start: 2020-08-06 | End: 2021-02-15 | Stop reason: SDUPTHER

## 2020-08-06 RX ORDER — PANTOPRAZOLE SODIUM 40 MG/1
40 TABLET, DELAYED RELEASE ORAL DAILY
Qty: 90 TABLET | Refills: 1 | Status: SHIPPED | OUTPATIENT
Start: 2020-08-06 | End: 2021-02-15 | Stop reason: SDUPTHER

## 2020-08-06 RX ORDER — GABAPENTIN 300 MG/1
900 CAPSULE ORAL 3 TIMES DAILY
Qty: 810 CAPSULE | Refills: 1 | Status: SHIPPED | OUTPATIENT
Start: 2020-08-06 | End: 2021-05-13 | Stop reason: SDUPTHER

## 2020-08-06 RX ORDER — AMLODIPINE BESYLATE 10 MG/1
10 TABLET ORAL DAILY
Qty: 90 TABLET | Refills: 1 | Status: SHIPPED | OUTPATIENT
Start: 2020-08-06 | End: 2021-02-15 | Stop reason: SDUPTHER

## 2020-08-06 NOTE — PROGRESS NOTES
SUBJECTIVE:    Patient ID: Patrick Wilson is a 60 y.o. male.    Chief Complaint: Follow-up (3 month//brought bottles tb )    59 yo male presents for regular checkup. Reports that he has been managing. He and his wife have been trying to stay home as much as possible. Pain decently managed with the norco. Tries to limit it to a few per day at the most. Doesn't drink heavily at all. Liver enzymes very mildly elevated but have been this way for quite sometime.      No visits with results within 6 Month(s) from this visit.   Latest known visit with results is:   Telephone on 01/16/2020   Component Date Value Ref Range Status    WBC 01/30/2020 8.4  3.8 - 10.8 Thousand/uL Final    RBC 01/30/2020 6.21* 4.20 - 5.80 Million/uL Final    Hemoglobin 01/30/2020 17.3* 13.2 - 17.1 g/dL Final    Hematocrit 01/30/2020 53.1* 38.5 - 50.0 % Final    Mean Corpuscular Volume 01/30/2020 85.5  80.0 - 100.0 fL Final    Mean Corpuscular Hemoglobin 01/30/2020 27.9  27.0 - 33.0 pg Final    Mean Corpuscular Hemoglobin Conc 01/30/2020 32.6  32.0 - 36.0 g/dL Final    RDW 01/30/2020 12.7  11.0 - 15.0 % Final    Platelets 01/30/2020 334  140 - 400 Thousand/uL Final    MPV 01/30/2020 10.4  7.5 - 12.5 fL Final    Neutrophils Absolute 01/30/2020 5,737  1,500 - 7,800 cells/uL Final    Lymph # 01/30/2020 1,831  850 - 3,900 cells/uL Final    Mono # 01/30/2020 722  200 - 950 cells/uL Final    Eos # 01/30/2020 59  15 - 500 cells/uL Final    Baso # 01/30/2020 50  0 - 200 cells/uL Final    Neutrophils Relative 01/30/2020 68.3  % Final    Lymph% 01/30/2020 21.8  % Final    Mono% 01/30/2020 8.6  % Final    Eosinophil% 01/30/2020 0.7  % Final    Basophil% 01/30/2020 0.6  % Final    Glucose 01/30/2020 99  65 - 99 mg/dL Final    BUN, Bld 01/30/2020 12  7 - 25 mg/dL Final    Creatinine 01/30/2020 0.87  0.70 - 1.33 mg/dL Final    eGFR if non African American 01/30/2020 96  > OR = 60 mL/min/1.73m2 Final    eGFR if   01/30/2020 111  > OR = 60 mL/min/1.73m2 Final    BUN/Creatinine Ratio 01/30/2020 NOT APPLICABLE  6 - 22 (calc) Final    Sodium 01/30/2020 138  135 - 146 mmol/L Final    Potassium 01/30/2020 4.3  3.5 - 5.3 mmol/L Final    Chloride 01/30/2020 99  98 - 110 mmol/L Final    CO2 01/30/2020 27  20 - 32 mmol/L Final    Calcium 01/30/2020 9.9  8.6 - 10.3 mg/dL Final    Total Protein 01/30/2020 7.3  6.1 - 8.1 g/dL Final    Albumin 01/30/2020 4.7  3.6 - 5.1 g/dL Final    Globulin, Total 01/30/2020 2.6  1.9 - 3.7 g/dL (calc) Final    Albumin/Globulin Ratio 01/30/2020 1.8  1.0 - 2.5 (calc) Final    Total Bilirubin 01/30/2020 0.6  0.2 - 1.2 mg/dL Final    Alkaline Phosphatase 01/30/2020 164* 40 - 115 U/L Final    AST 01/30/2020 21  10 - 35 U/L Final    ALT 01/30/2020 62* 9 - 46 U/L Final    Cholesterol 01/30/2020 196  <200 mg/dL Final    HDL 01/30/2020 49  >40 mg/dL Final    Triglycerides 01/30/2020 139  <150 mg/dL Final    LDL Cholesterol 01/30/2020 122* mg/dL (calc) Final    Hdl/Cholesterol Ratio 01/30/2020 4.0  <5.0 (calc) Final    Non HDL Chol. (LDL+VLDL) 01/30/2020 147* <130 mg/dL (calc) Final    TSH 01/30/2020 1.89  0.40 - 4.50 mIU/L Final    Color, UA 01/30/2020 YELLOW  YELLOW Final    Appearance, UA 01/30/2020 CLEAR  CLEAR Final    Specific [place], UA 01/30/2020 1.010  1.001 - 1.035 Final    pH, UA 01/30/2020 7.5  5.0 - 8.0 Final    Glucose, UA 01/30/2020 NEGATIVE  NEGATIVE Final    Bilirubin, UA 01/30/2020 NEGATIVE  NEGATIVE Final    Ketones, UA 01/30/2020 NEGATIVE  NEGATIVE Final    Occult Blood UA 01/30/2020 NEGATIVE  NEGATIVE Final    Protein, UA 01/30/2020 NEGATIVE  NEGATIVE Final    Nitrite, UA 01/30/2020 NEGATIVE  NEGATIVE Final    Leukocytes, UA 01/30/2020 NEGATIVE  NEGATIVE Final    WBC Casts, UA 01/30/2020 NONE SEEN  < OR = 5 /HPF Final    RBC Casts, UA 01/30/2020 NONE SEEN  < OR = 2 /HPF Final    Squam Epithel, UA 01/30/2020 NONE SEEN  < OR = 5 /HPF Final    Bacteria, UA  01/30/2020 NONE SEEN  NONE SEEN /HPF Final    Hyaline Casts, UA 01/30/2020 NONE SEEN  NONE SEEN /LPF Final    Reflexive Urine Culture 01/30/2020 NO CULTURE INDICATED   Final    PROSTATE SPECIFIC ANTIGEN, SCR - Q* 01/30/2020 2.5  < OR = 4.0 ng/mL Final       Past Medical History:   Diagnosis Date    Anticoagulant long-term use     Anxiety     Arthritis     Back pain     Cerebral palsy     Depression     GERD (gastroesophageal reflux disease)     Hypertension     Peptic ulcer of stomach     Seizures     AS A CHILD    Wears glasses     CONTACS     Past Surgical History:   Procedure Laterality Date    ANKLE SURGERY Left     BACK SURGERY      CYSTOSCOPY N/A 7/17/2018    Procedure: CYSTOSCOPY;  Surgeon: Jonathan Jacinto MD;  Location: [facility] OR;  Service: Urology;  Laterality: N/A;    CYSTOSCOPY WITH INSERTION OF MINIMALLY INVASIVE IMPLANT TO ENLARGE PROSTATIC URETHRA N/A 8/2/2018    Procedure: CYSTOSCOPY, WITH INSERTION OF UROLIFT IMPLANT;  Surgeon: Jonathan Jacinto MD;  Location: [facility] OR;  Service: Urology;  Laterality: N/A;    LEG SURGERY Left     SHOULDER SURGERY Right     TRANSRECTAL ULTRASOUND EXAMINATION N/A 7/17/2018    Procedure: ULTRASOUND, TRANSRECTAL;  Surgeon: Jonathan Jacinto MD;  Location: [facility] OR;  Service: Urology;  Laterality: N/A;     History reviewed. No pertinent family history.    Marital Status:   Alcohol History:  reports current alcohol use.  Tobacco History:  reports that he has been smoking pipe. He has never used smokeless tobacco.  Drug History:  reports no history of drug use.    Review of patient's allergies indicates:   Allergen Reactions    Tylox [oxycodone-acetaminophen] Nausea And Vomiting       Current Outpatient Medications:     ALPRAZolam (XANAX) 1 MG tablet, Take 1 tablet (1 mg total) by mouth once daily., Disp: 90 tablet, Rfl: 1    amLODIPine (NORVASC) 10 MG tablet, Take 1 tablet (10 mg total) by mouth once daily., Disp: 90 tablet, Rfl: 1     aspirin (ECOTRIN) 81 MG EC tablet, Take 81 mg by mouth once daily., Disp: , Rfl:     atorvastatin (LIPITOR) 40 MG tablet, Take 1 tablet (40 mg total) by mouth once daily., Disp: 90 tablet, Rfl: 1    COZAAR 50 mg tablet, , Disp: , Rfl:     cyclobenzaprine (FLEXERIL) 5 MG tablet, Take 5 mg by mouth 3 (three) times daily as needed. , Disp: , Rfl:     DULoxetine (CYMBALTA) 60 MG capsule, Take 1 capsule (60 mg total) by mouth once daily., Disp: 90 capsule, Rfl: 1    fluticasone (FLONASE) 50 mcg/actuation nasal spray, 1 spray by Each Nostril route as needed. , Disp: , Rfl:     gabapentin (NEURONTIN) 300 MG capsule, Take 3 capsules (900 mg total) by mouth 3 (three) times daily., Disp: 810 capsule, Rfl: 1    hydroCHLOROthiazide (HYDRODIURIL) 25 MG tablet, Take 1 tablet (25 mg total) by mouth once daily., Disp: 90 tablet, Rfl: 1    HYDROcodone-acetaminophen (NORCO)  mg per tablet, Take 1 tablet by mouth every 8 (eight) hours as needed for Pain., Disp: 90 tablet, Rfl: 0    KRILL OIL ORAL, Take by mouth., Disp: , Rfl:     multivit-min/folic/vit K/lycop (MEN'S 50 PLUS MULTIVITAMIN ORAL), Take 1 tablet by mouth once daily., Disp: , Rfl:     pantoprazole (PROTONIX) 40 MG tablet, Take 1 tablet (40 mg total) by mouth once daily., Disp: 90 tablet, Rfl: 1    sildenafil (VIAGRA) 100 MG tablet, Take 1 tablet (100 mg total) by mouth as needed for Erectile Dysfunction., Disp: 6 tablet, Rfl: 6    Review of Systems   Constitutional: Negative for activity change, fatigue, fever and unexpected weight change.   HENT: Negative for congestion.    Respiratory: Negative for apnea, cough, chest tightness and shortness of breath.    Cardiovascular: Negative for chest pain and palpitations.   Gastrointestinal: Negative for abdominal distention and abdominal pain.   Genitourinary: Negative for difficulty urinating and dysuria.   Musculoskeletal: Positive for arthralgias, back pain (chronic) and myalgias.   Neurological: Negative for  "dizziness and weakness.          Objective:      Vitals:    08/06/20 1541   BP: 132/80   Pulse: 74   Temp: 98.2 °F (36.8 °C)   Weight: 104.3 kg (230 lb)   Height: 5' 7" (1.702 m)     Physical Exam  Constitutional:       General: He is not in acute distress.     Appearance: He is well-developed.   HENT:      Head: Normocephalic and atraumatic.   Eyes:      Pupils: Pupils are equal, round, and reactive to light.   Neck:      Musculoskeletal: Normal range of motion and neck supple.      Thyroid: No thyromegaly.   Cardiovascular:      Rate and Rhythm: Normal rate and regular rhythm.      Heart sounds: Normal heart sounds.   Pulmonary:      Effort: Pulmonary effort is normal.      Breath sounds: Normal breath sounds.   Abdominal:      General: Bowel sounds are normal. There is no distension.      Palpations: Abdomen is soft.      Tenderness: There is no abdominal tenderness.   Musculoskeletal: Normal range of motion.   Skin:     General: Skin is warm and dry.      Findings: No erythema or rash.   Neurological:      Mental Status: He is alert and oriented to person, place, and time.      Cranial Nerves: No cranial nerve deficit.           Assessment:       1. Anxiety disorder, unspecified type    2. Chronic pain syndrome    3. Essential hypertension    4. Gastroesophageal reflux disease, esophagitis presence not specified    5. Hyperlipidemia, unspecified hyperlipidemia type         Plan:       Anxiety disorder, unspecified type  Comments:  mood and anxiety appear to be stable at this time. continue as is.  Orders:  -     ALPRAZolam (XANAX) 1 MG tablet; Take 1 tablet (1 mg total) by mouth once daily.  Dispense: 90 tablet; Refill: 1    Chronic pain syndrome  Comments:  Managed pretty well. Refills today through Dr. Chacon  Orders:  -     gabapentin (NEURONTIN) 300 MG capsule; Take 3 capsules (900 mg total) by mouth 3 (three) times daily.  Dispense: 810 capsule; Refill: 1    Essential hypertension  Comments:  Doing well. " rechecked by me. refills today.  Orders:  -     amLODIPine (NORVASC) 10 MG tablet; Take 1 tablet (10 mg total) by mouth once daily.  Dispense: 90 tablet; Refill: 1    Gastroesophageal reflux disease, esophagitis presence not specified  -     pantoprazole (PROTONIX) 40 MG tablet; Take 1 tablet (40 mg total) by mouth once daily.  Dispense: 90 tablet; Refill: 1    Hyperlipidemia, unspecified hyperlipidemia type  -     atorvastatin (LIPITOR) 40 MG tablet; Take 1 tablet (40 mg total) by mouth once daily.  Dispense: 90 tablet; Refill: 1    Other orders  -     DULoxetine (CYMBALTA) 60 MG capsule; Take 1 capsule (60 mg total) by mouth once daily.  Dispense: 90 capsule; Refill: 1  -     hydroCHLOROthiazide (HYDRODIURIL) 25 MG tablet; Take 1 tablet (25 mg total) by mouth once daily.  Dispense: 90 tablet; Refill: 1      Follow up in about 3 months (around 11/6/2020) for VV followup 6 mos in person.        8/6/2020 Yaya Nick PA-C

## 2020-08-06 NOTE — PATIENT INSTRUCTIONS
Chronic Pain  Pain serves an important role. It lets you know something is wrong that needs your attention. When the body heals, pain normally goes away.  When pain lasts longer than six months, it is called chronic pain. This is pain that is present even after the body has healed. Chronic pain can cause mood problems and get in the way of your relationships and your daily life.  A number of conditions can cause chronic pain. Some of the more common include:  · Previous surgery  · An old injury  · Infection  · Diseases such as diabetes  · Nerve damage  · Back injury  · Arthritis  · Migraine or other headaches  · Fibromyalgia  · Cancer  Depression and stress can make chronic pain symptoms worse. In some cases, a cause for the pain cannot be found.   Treatment  Treatment can greatly reduce pain. In many cases, pain can become less severe, occur less often, and interfere less with your daily life. Chronic pain is often treated with a combination of medicines, therapies, and lifestyle changes. You will work closely with your healthcare provider to find a treatment plan that works best for you.  · Ask your healthcare provider for a referral to a pain management specialty center. These can provide the most recent and proven pain management strategies, along with emotional support and comprehensive services.  · Several different types of medicines may be prescribed for chronic pain. Work with your healthcare provider to develop a medicine plan that helps manage your pain.  · Physical therapy can be very effective in helping reduce certain types of chronic pain.  · Occupational therapy teaches you how to do routine tasks of daily living in ways that lessen your discomfort.  · Psychological therapy can help you cope better with stress and pain.  · Other therapies such as meditation, yoga, biofeedback, massage, and acupuncture can also help manage chronic pain.  · Changing certain habits can help reduce chronic pain. They  include:  ¨ Eating healthy  ¨ Developing an exercise routine  ¨ Getting enough sleep at night  ¨ Stopping smoking and limiting alcohol use  ¨ Losing excess weight  Follow-up care  Follow up with your healthcare provider as advised. Let your healthcare provider know if your current treatment plan is working or if changes are needed.  Resources  For more information, contact:  · American [place] for Headache Society www.achenet.org  · American Chronic Pain Association www.theacpa.org 969-285-9490  Date Last Reviewed: 7/26/2015 © 2000-2017 [employer]. [address] [place] [address]. All rights reserved. This information is not intended as a substitute for professional medical care. Always follow your healthcare professional's instructions.        High Cholesterol  High cholesterol is also called hypercholesterolemia. Cholesterol and dietary fat are not the same thing. But, its important to understand how the fat in your diet affects your cholesterol level.  Your body needs cholesterol to build new cells and make certain hormones. There are 2 kinds of cholesterol in your body:  · HDL (good) cholesterol stops fatty deposits (plaque) from building up in your arteries. In this way it protects you against heart disease and stroke.  · LDL (bad) cholesterol stays in your body and sticks to artery walls. It may later block blood flow to your heart and brain. This can cause a heart attack (acute myocardial infarction) or stroke.  Your body makes all the cholesterol it needs. But you also get cholesterol from many of the foods you eat. This is why you want to limit how much cholesterol you get in your diet  and how much fat you eat. Thats because the cholesterol your body makes from the fat you eat creates the most risk for disease. The type of fat you eat has the biggest influence on how much cholesterol your body makes.   Fats come in 2 kinds:  · Good fats are the unsaturated fats. These are  also called monounsaturated and polyunsaturated fats. They raise the level of good cholesterol and lower the level of bad cholesterol. Good fats are found in vegetable oils like olive, sunflower, corn, and soybean oils, and in nuts and seeds.  · Bad fats are saturated fats and trans fats. These raise the risk for disease. They lower the good cholesterol and raise the level of bad cholesterol. Bad fats are found in all red meat and whole-milk dairy products. Some plants also have a lot of saturated fats such as coconut and palm plants. Trans fats are found in stick margarines and many fast foods and commercially baked goods. Soft margarine sold in tubs has less trans fat and is safer to use. Trans fat in particular raise bad cholesterol and lowers good cholesterol.  You can have high blood cholesterol if you eat a diet high in saturated fat and dont get much exercise. In some cases, your family history plays a role. Your health care provider can diagnose high cholesterol with blood tests. Treatment consists of a diet low in saturated fat, weight loss, and exercise. If these efforts dont lower your cholesterol, your provider may prescribe medicines. They must be taken daily to keep your cholesterol levels low. Being overweight also raises the risk for high cholesterol and heart disease. Losing even a small amount of weight can help lower your risk.  High risk groups  Certain groups of people should talk to their healthcare provider about using cholesterol-lowering statin medicines for controlling their cholesterol to stay healthy or to prevent future heart attacks or stroke. It may be beneficial to take a medicine in addition to eating a healthy diet and exercising regularly for these groups. The major groups include:  · Adults who have had a heart attack or stroke or some other atherosclerotic disease (such as peripheral vascular disease), a transient ischemic attack, stable or unstable angina, and anyone who has  had a procedure to restore blood flow through a blocked artery such as percutaneous coronary intervention, angioplasty, stent, open-heart bypass surgery.  · Adults who have diabetes or an elevated calculated risk of having a heart attack or stroke (7.5%) and an elevated level of LDL cholesterol  mg/dL.  · People who are 21 years of age and older who have an elevated LDL cholesterol level of 190 mg/dL or higher  Home care  Follow these guidelines when caring for yourself at home:  · Talk with your health care provider before starting a low-cholesterol diet or weight-loss program.  · In general, a low-cholesterol diet means that you eat less saturated fat (red meat and regular dairy) and less cholesterol each day. You may eat foods with unsaturated fats (vegetable oils, nuts, and seeds). Eat more fruits, vegetables, fish, and whole grains, or other high-fiber foods.  · Learn to read food labels so you know what you are eating.  · A registered dietitian can teach you how to plan meals and change your diet. You can ask your provider for a referral.  · Aim for 40 minutes of moderate to vigorous physical activity 3 to 4 times a week. Pick activities you enjoy. Walking is a good choice if you want to lose weight. If you have diabetes, hypertension, or heart disease, talk with your provider to see what activities he or she recommends.  · If your provider has prescribed medicines, take them as directed.  · If you smoke, talk with your provider about how to quit smoking. Smoking lowers good cholesterol levels and can increase damage done by bad cholesterol.  · Limit how much alcohol you drink.  · If you have diabetes, talk with your provider and a dietitian about other food and lifestyle changes you can make to lower your risk for heart disease and stroke.  Follow-up care  Follow up with your healthcare provider, or as advised. It takes at least 3 months for dietary changes to show a result in your blood cholesterol.  Have repeat blood testing as advised by your provider.  If an X-ray or ECG (electrocardiogram) was done, a specialist will look at it. You will be told of any new findings that may affect your care.  When to seek medical advice  Call your healthcare provider right away if any of these occur:  · Chest, arm, shoulder, neck, or upper back pain  · Shortness of breath  · Weakness or numbness of an arm, leg, or one side of the face  · Trouble with speech or vision  · Weakness, dizziness, or fainting  Talk to your healthcare provider about your treatment goals. Make sure you understand how cholesterol impacts you based on your personal health history and family history of heart disease or high cholesterol. Plan to have regular monitoring and follow up on any side effects that you may develop to the cholesterol-lowering medicines. Be aware that sometimes you may need more than one medicine to reach your cholesterol goals. Also make sure you understand how to prepare for your cholesterol testing which may or may not require fasting.  Date Last Reviewed: 1/1/2017  © 4621-2474 [employer]. [address] [place] [address], [place] [address]. All rights reserved. This information is not intended as a substitute for professional medical care. Always follow your healthcare professional's instructions.

## 2020-09-10 ENCOUNTER — CLINICAL SUPPORT (OUTPATIENT)
Dept: FAMILY MEDICINE | Facility: CLINIC | Age: 60
End: 2020-09-10
Payer: MEDICARE

## 2020-09-10 VITALS — TEMPERATURE: 98 F

## 2020-09-10 DIAGNOSIS — Z23 NEED FOR INFLUENZA VACCINATION: Primary | ICD-10-CM

## 2020-09-10 PROCEDURE — 90682 FLU VACCINE - QUADRIVALENT (RECOMBINANT) PRESERVATIVE FREE: ICD-10-PCS | Mod: S$GLB,,, | Performed by: FAMILY MEDICINE

## 2020-09-10 PROCEDURE — 90682 RIV4 VACC RECOMBINANT DNA IM: CPT | Mod: S$GLB,,, | Performed by: FAMILY MEDICINE

## 2020-09-10 PROCEDURE — G0008 ADMIN INFLUENZA VIRUS VAC: HCPCS | Mod: S$GLB,,, | Performed by: FAMILY MEDICINE

## 2020-09-10 PROCEDURE — G0008 FLU VACCINE - QUADRIVALENT (RECOMBINANT) PRESERVATIVE FREE: ICD-10-PCS | Mod: S$GLB,,, | Performed by: FAMILY MEDICINE

## 2020-09-14 DIAGNOSIS — I10 ESSENTIAL HYPERTENSION: Primary | ICD-10-CM

## 2020-09-14 RX ORDER — LOSARTAN POTASSIUM 25 MG/1
25 TABLET ORAL 2 TIMES DAILY
Qty: 180 TABLET | Refills: 1 | Status: ON HOLD | OUTPATIENT
Start: 2020-09-14 | End: 2021-05-27

## 2020-09-14 RX ORDER — LOSARTAN POTASSIUM 25 MG/1
25 TABLET ORAL 2 TIMES DAILY
COMMUNITY
End: 2020-09-14

## 2020-09-14 RX ORDER — LOSARTAN POTASSIUM 50 MG/1
50 TABLET ORAL DAILY
COMMUNITY
End: 2021-05-03 | Stop reason: SDUPTHER

## 2020-09-14 NOTE — TELEPHONE ENCOUNTER
----- Message from Shakila Estrada sent at 9/14/2020 10:23 AM CDT -----  VM 10: [facility]Pharmacy #  [phone] Judy # [phone]

## 2020-09-17 ENCOUNTER — PATIENT MESSAGE (OUTPATIENT)
Dept: FAMILY MEDICINE | Facility: CLINIC | Age: 60
End: 2020-09-17

## 2020-09-17 RX ORDER — HYDROCODONE BITARTRATE AND ACETAMINOPHEN 10; 325 MG/1; MG/1
1 TABLET ORAL EVERY 8 HOURS PRN
Qty: 90 TABLET | Refills: 0 | Status: SHIPPED | OUTPATIENT
Start: 2020-09-17 | End: 2020-10-14 | Stop reason: SDUPTHER

## 2020-09-17 RX ORDER — CYCLOBENZAPRINE HCL 5 MG
5 TABLET ORAL 3 TIMES DAILY PRN
Qty: 90 TABLET | Refills: 2 | Status: SHIPPED | OUTPATIENT
Start: 2020-09-17 | End: 2021-03-25 | Stop reason: SDUPTHER

## 2020-10-14 RX ORDER — HYDROCODONE BITARTRATE AND ACETAMINOPHEN 10; 325 MG/1; MG/1
1 TABLET ORAL EVERY 8 HOURS PRN
Qty: 90 TABLET | Refills: 0 | Status: SHIPPED | OUTPATIENT
Start: 2020-10-17 | End: 2020-11-16 | Stop reason: SDUPTHER

## 2020-11-10 ENCOUNTER — OFFICE VISIT (OUTPATIENT)
Dept: FAMILY MEDICINE | Facility: CLINIC | Age: 60
End: 2020-11-10
Payer: MEDICARE

## 2020-11-10 DIAGNOSIS — N40.1 BPH WITH OBSTRUCTION/LOWER URINARY TRACT SYMPTOMS: Primary | ICD-10-CM

## 2020-11-10 DIAGNOSIS — I10 ESSENTIAL HYPERTENSION: ICD-10-CM

## 2020-11-10 DIAGNOSIS — N13.8 BPH WITH OBSTRUCTION/LOWER URINARY TRACT SYMPTOMS: Primary | ICD-10-CM

## 2020-11-10 DIAGNOSIS — G89.4 CHRONIC PAIN SYNDROME: ICD-10-CM

## 2020-11-10 PROCEDURE — 99213 OFFICE O/P EST LOW 20 MIN: CPT | Mod: 95,,, | Performed by: PHYSICIAN ASSISTANT

## 2020-11-10 PROCEDURE — 99213 PR OFFICE/OUTPT VISIT, EST, LEVL III, 20-29 MIN: ICD-10-PCS | Mod: 95,,, | Performed by: PHYSICIAN ASSISTANT

## 2020-11-10 NOTE — PATIENT INSTRUCTIONS
Prostate Anatomy  The prostate gland is part of the male reproductive system. The prostate is located below the bladder. It surrounds the urethra, the tube that carries urine and semen out of the body. The function of the prostate is to produce fluid. This fluid mixes with fluid from the seminal vesicles and sperm from the testicles to form semen. During ejaculation, semen travels through the urethra and out of the penis. Prostate health is closely linked to hormones, chemicals that carry messages throughout the body. Normal levels of hormones, such as testosterone, keep the prostate working correctly.    Date Last Reviewed: 2/1/2017  © 3863-7244 [employer]. [address] [place] [address], [place] [address]. All rights reserved. This information is not intended as a substitute for professional medical care. Always follow your healthcare professional's instructions.

## 2020-11-10 NOTE — PROGRESS NOTES
SUBJECTIVE:    Patient ID: Patrick Wilson is a 60 y.o. male.    Chief Complaint: No chief complaint on file.    This is a 59 yo wm presenting for regular checkup and refills. Reports that his main concern at this time is his bladder/prostate. Finds that he is urinating 2-3 times at night. Sometimes he loses the urine before he can get to the bathroom. Overall better since having the urolift procedure a few years back with Dr. Jacinto. He is trying to stay up later after his last drink and this helps sometimes but not all. Reports good control of his BP as managed at home. He has a light dermatitis to the Left upper eye lid that has been slightly irritating to him. Applying cortisone otc that is helping somewhat. Denies needing refills. Knows that I want to see him in person next time with labs.      No visits with results within 6 Month(s) from this visit.   Latest known visit with results is:   Telephone on 01/16/2020   Component Date Value Ref Range Status    WBC 01/30/2020 8.4  3.8 - 10.8 Thousand/uL Final    RBC 01/30/2020 6.21* 4.20 - 5.80 Million/uL Final    Hemoglobin 01/30/2020 17.3* 13.2 - 17.1 g/dL Final    Hematocrit 01/30/2020 53.1* 38.5 - 50.0 % Final    MCV 01/30/2020 85.5  80.0 - 100.0 fL Final    MCH 01/30/2020 27.9  27.0 - 33.0 pg Final    MCHC 01/30/2020 32.6  32.0 - 36.0 g/dL Final    RDW 01/30/2020 12.7  11.0 - 15.0 % Final    Platelets 01/30/2020 334  140 - 400 Thousand/uL Final    MPV 01/30/2020 10.4  7.5 - 12.5 fL Final    Neutrophils Absolute 01/30/2020 5,737  1,500 - 7,800 cells/uL Final    Lymph # 01/30/2020 1,831  850 - 3,900 cells/uL Final    Mono # 01/30/2020 722  200 - 950 cells/uL Final    Eos # 01/30/2020 59  15 - 500 cells/uL Final    Baso # 01/30/2020 50  0 - 200 cells/uL Final    Neutrophils Relative 01/30/2020 68.3  % Final    Lymph % 01/30/2020 21.8  % Final    Mono % 01/30/2020 8.6  % Final    Eosinophil % 01/30/2020 0.7  % Final    Basophil %  01/30/2020 0.6  % Final    Glucose 01/30/2020 99  65 - 99 mg/dL Final    BUN 01/30/2020 12  7 - 25 mg/dL Final    Creatinine 01/30/2020 0.87  0.70 - 1.33 mg/dL Final    eGFR if non African American 01/30/2020 96  > OR = 60 mL/min/1.73m2 Final    eGFR if African American 01/30/2020 111  > OR = 60 mL/min/1.73m2 Final    BUN/Creatinine Ratio 01/30/2020 NOT APPLICABLE  6 - 22 (calc) Final    Sodium 01/30/2020 138  135 - 146 mmol/L Final    Potassium 01/30/2020 4.3  3.5 - 5.3 mmol/L Final    Chloride 01/30/2020 99  98 - 110 mmol/L Final    CO2 01/30/2020 27  20 - 32 mmol/L Final    Calcium 01/30/2020 9.9  8.6 - 10.3 mg/dL Final    Total Protein 01/30/2020 7.3  6.1 - 8.1 g/dL Final    Albumin 01/30/2020 4.7  3.6 - 5.1 g/dL Final    Globulin, Total 01/30/2020 2.6  1.9 - 3.7 g/dL (calc) Final    Albumin/Globulin Ratio 01/30/2020 1.8  1.0 - 2.5 (calc) Final    Total Bilirubin 01/30/2020 0.6  0.2 - 1.2 mg/dL Final    Alkaline Phosphatase 01/30/2020 164* 40 - 115 U/L Final    AST 01/30/2020 21  10 - 35 U/L Final    ALT 01/30/2020 62* 9 - 46 U/L Final    Cholesterol 01/30/2020 196  <200 mg/dL Final    HDL 01/30/2020 49  >40 mg/dL Final    Triglycerides 01/30/2020 139  <150 mg/dL Final    LDL Cholesterol 01/30/2020 122* mg/dL (calc) Final    HDL/Cholesterol Ratio 01/30/2020 4.0  <5.0 (calc) Final    Non HDL Chol. (LDL+VLDL) 01/30/2020 147* <130 mg/dL (calc) Final    TSH 01/30/2020 1.89  0.40 - 4.50 mIU/L Final    Color, UA 01/30/2020 YELLOW  YELLOW Final    Appearance, UA 01/30/2020 CLEAR  CLEAR Final    Specific [place], UA 01/30/2020 1.010  1.001 - 1.035 Final    pH, UA 01/30/2020 7.5  5.0 - 8.0 Final    Glucose, UA 01/30/2020 NEGATIVE  NEGATIVE Final    Bilirubin, UA 01/30/2020 NEGATIVE  NEGATIVE Final    Ketones, UA 01/30/2020 NEGATIVE  NEGATIVE Final    Occult Blood UA 01/30/2020 NEGATIVE  NEGATIVE Final    Protein, UA 01/30/2020 NEGATIVE  NEGATIVE Final    Nitrite, UA 01/30/2020 NEGATIVE   NEGATIVE Final    Leukocytes, UA 01/30/2020 NEGATIVE  NEGATIVE Final    WBC Casts, UA 01/30/2020 NONE SEEN  < OR = 5 /HPF Final    RBC Casts, UA 01/30/2020 NONE SEEN  < OR = 2 /HPF Final    Squam Epithel, UA 01/30/2020 NONE SEEN  < OR = 5 /HPF Final    Bacteria, UA 01/30/2020 NONE SEEN  NONE SEEN /HPF Final    Hyaline Casts, UA 01/30/2020 NONE SEEN  NONE SEEN /LPF Final    Reflexive Urine Culture 01/30/2020 NO CULTURE INDICATED   Final    PROSTATE SPECIFIC ANTIGEN, SCR - Q* 01/30/2020 2.5  < OR = 4.0 ng/mL Final       Past Medical History:   Diagnosis Date    Anticoagulant long-term use     Anxiety     Arthritis     Back pain     Cerebral palsy     Depression     GERD (gastroesophageal reflux disease)     Hypertension     Peptic ulcer of stomach     Seizures     AS A CHILD    Wears glasses     CONTACS     Past Surgical History:   Procedure Laterality Date    ANKLE SURGERY Left     BACK SURGERY      CYSTOSCOPY N/A 7/17/2018    Procedure: CYSTOSCOPY;  Surgeon: Jonathan Jacinto MD;  [facility];  Service: Urology;  Laterality: N/A;    CYSTOSCOPY WITH INSERTION OF MINIMALLY INVASIVE IMPLANT TO ENLARGE PROSTATIC URETHRA N/A 8/2/2018    Procedure: CYSTOSCOPY, WITH INSERTION OF UROLIFT IMPLANT;  Surgeon: Jonathan Jacinto MD;  Location: [facility] OR;  Service: Urology;  Laterality: N/A;    LEG SURGERY Left     SHOULDER SURGERY Right     TRANSRECTAL ULTRASOUND EXAMINATION N/A 7/17/2018    Procedure: ULTRASOUND, TRANSRECTAL;  Surgeon: Jonathan Jacinto MD;  Location: [facility] OR;  Service: Urology;  Laterality: N/A;     History reviewed. No pertinent family history.    Marital Status:   Alcohol History:  reports current alcohol use.  Tobacco History:  reports that he has been smoking pipe. He has never used smokeless tobacco.  Drug History:  reports no history of drug use.    Review of patient's allergies indicates:   Allergen Reactions    Tylox [oxycodone-acetaminophen] Nausea And  Vomiting       Current Outpatient Medications:     ALPRAZolam (XANAX) 1 MG tablet, Take 1 tablet (1 mg total) by mouth once daily., Disp: 90 tablet, Rfl: 1    amLODIPine (NORVASC) 10 MG tablet, Take 1 tablet (10 mg total) by mouth once daily., Disp: 90 tablet, Rfl: 1    aspirin (ECOTRIN) 81 MG EC tablet, Take 81 mg by mouth once daily., Disp: , Rfl:     atorvastatin (LIPITOR) 40 MG tablet, Take 1 tablet (40 mg total) by mouth once daily., Disp: 90 tablet, Rfl: 1    cyclobenzaprine (FLEXERIL) 5 MG tablet, Take 1 tablet (5 mg total) by mouth 3 (three) times daily as needed., Disp: 90 tablet, Rfl: 2    DULoxetine (CYMBALTA) 60 MG capsule, Take 1 capsule (60 mg total) by mouth once daily., Disp: 90 capsule, Rfl: 1    fluticasone (FLONASE) 50 mcg/actuation nasal spray, 1 spray by Each Nostril route as needed. , Disp: , Rfl:     gabapentin (NEURONTIN) 300 MG capsule, Take 3 capsules (900 mg total) by mouth 3 (three) times daily., Disp: 810 capsule, Rfl: 1    hydroCHLOROthiazide (HYDRODIURIL) 25 MG tablet, Take 1 tablet (25 mg total) by mouth once daily., Disp: 90 tablet, Rfl: 1    HYDROcodone-acetaminophen (NORCO)  mg per tablet, Take 1 tablet by mouth every 8 (eight) hours as needed for Pain., Disp: 90 tablet, Rfl: 0    KRILL OIL ORAL, Take by mouth., Disp: , Rfl:     losartan (COZAAR) 25 MG tablet, Take 1 tablet (25 mg total) by mouth 2 (two) times a day., Disp: 180 tablet, Rfl: 1    losartan (COZAAR) 50 MG tablet, Take 50 mg by mouth once daily., Disp: , Rfl:     multivit-min/folic/vit K/lycop (MEN'S 50 PLUS MULTIVITAMIN ORAL), Take 1 tablet by mouth once daily., Disp: , Rfl:     pantoprazole (PROTONIX) 40 MG tablet, Take 1 tablet (40 mg total) by mouth once daily., Disp: 90 tablet, Rfl: 1    sildenafil (VIAGRA) 100 MG tablet, Take 1 tablet (100 mg total) by mouth as needed for Erectile Dysfunction., Disp: 6 tablet, Rfl: 6    Review of Systems   Constitutional: Negative for activity change and  unexpected weight change.   HENT: Negative for hearing loss, rhinorrhea and trouble swallowing.    Eyes: Negative for discharge and visual disturbance.   Respiratory: Negative for chest tightness and wheezing.    Cardiovascular: Negative for chest pain and palpitations.   Gastrointestinal: Negative for blood in stool, constipation, diarrhea and vomiting.   Endocrine: Positive for polyuria. Negative for polydipsia.   Genitourinary: Positive for urgency. Negative for difficulty urinating and hematuria.   Musculoskeletal: Positive for arthralgias and joint swelling. Negative for neck pain.   Neurological: Negative for weakness and headaches.   Psychiatric/Behavioral: Positive for dysphoric mood. Negative for confusion.          Objective:      There were no vitals filed for this visit.  Physical Exam  Constitutional:       General: He is not in acute distress.     Appearance: He is well-developed.   HENT:      Head: Normocephalic and atraumatic.   Eyes:      Pupils: Pupils are equal, round, and reactive to light.   Neck:      Thyroid: No thyromegaly.   Pulmonary:      Effort: Pulmonary effort is normal.   Abdominal:      General: There is no distension.      Palpations: Abdomen is soft.      Tenderness: There is no abdominal tenderness.   Musculoskeletal: Normal range of motion.   Skin:     General: Skin is warm and dry.      Findings: No erythema or rash.   Neurological:      Mental Status: He is alert and oriented to person, place, and time.      Cranial Nerves: No cranial nerve deficit.           Assessment:       1. BPH with obstruction/lower urinary tract symptoms    2. Chronic pain syndrome    3. Essential hypertension         Plan:       BPH with obstruction/lower urinary tract symptoms  Comments:  S/P urolift procedure 2 years ago. Sxs immediately improved after surgery and have gradually worsened since. Will plan to discuss further with urology.    Chronic pain syndrome  Comments:  Managed with   [facility].    Essential hypertension  Comments:  BP averaging good numbers at home. continue as is. No issues. compliant with meds as prescribed.      Follow up in about 3 months (around 2/10/2021) for BP Check-Up.        11/10/2020 Yaya Nick PA-C

## 2020-11-16 RX ORDER — HYDROCODONE BITARTRATE AND ACETAMINOPHEN 10; 325 MG/1; MG/1
1 TABLET ORAL EVERY 8 HOURS PRN
Qty: 90 TABLET | Refills: 0 | Status: SHIPPED | OUTPATIENT
Start: 2020-11-16 | End: 2020-12-15 | Stop reason: SDUPTHER

## 2020-12-15 RX ORDER — HYDROCODONE BITARTRATE AND ACETAMINOPHEN 10; 325 MG/1; MG/1
1 TABLET ORAL EVERY 8 HOURS PRN
Qty: 90 TABLET | Refills: 0 | Status: SHIPPED | OUTPATIENT
Start: 2020-12-17 | End: 2021-01-18 | Stop reason: SDUPTHER

## 2021-01-18 RX ORDER — HYDROCODONE BITARTRATE AND ACETAMINOPHEN 10; 325 MG/1; MG/1
1 TABLET ORAL EVERY 8 HOURS PRN
Qty: 90 TABLET | Refills: 0 | Status: SHIPPED | OUTPATIENT
Start: 2021-01-18 | End: 2021-02-15 | Stop reason: SDUPTHER

## 2021-02-02 ENCOUNTER — TELEPHONE (OUTPATIENT)
Dept: FAMILY MEDICINE | Facility: CLINIC | Age: 61
End: 2021-02-02

## 2021-02-02 DIAGNOSIS — I10 ESSENTIAL HYPERTENSION: ICD-10-CM

## 2021-02-02 DIAGNOSIS — Z12.5 SPECIAL SCREENING FOR MALIGNANT NEOPLASM OF PROSTATE: ICD-10-CM

## 2021-02-02 DIAGNOSIS — Z79.899 ENCOUNTER FOR LONG-TERM (CURRENT) USE OF OTHER MEDICATIONS: Primary | ICD-10-CM

## 2021-02-03 DIAGNOSIS — F41.9 ANXIETY DISORDER, UNSPECIFIED TYPE: ICD-10-CM

## 2021-02-03 RX ORDER — ALPRAZOLAM 1 MG/1
1 TABLET ORAL DAILY
Qty: 30 TABLET | Refills: 0 | Status: SHIPPED | OUTPATIENT
Start: 2021-02-03 | End: 2021-02-15 | Stop reason: SDUPTHER

## 2021-02-15 ENCOUNTER — OFFICE VISIT (OUTPATIENT)
Dept: FAMILY MEDICINE | Facility: CLINIC | Age: 61
End: 2021-02-15
Payer: MEDICARE

## 2021-02-15 VITALS
HEART RATE: 72 BPM | BODY MASS INDEX: 36.26 KG/M2 | HEIGHT: 67 IN | WEIGHT: 231 LBS | DIASTOLIC BLOOD PRESSURE: 70 MMHG | SYSTOLIC BLOOD PRESSURE: 138 MMHG

## 2021-02-15 DIAGNOSIS — Z51.81 ENCOUNTER FOR THERAPEUTIC DRUG MONITORING: ICD-10-CM

## 2021-02-15 DIAGNOSIS — K21.9 GASTROESOPHAGEAL REFLUX DISEASE, UNSPECIFIED WHETHER ESOPHAGITIS PRESENT: ICD-10-CM

## 2021-02-15 DIAGNOSIS — L98.9 SKIN LESION OF RIGHT ARM: ICD-10-CM

## 2021-02-15 DIAGNOSIS — F41.9 ANXIETY DISORDER, UNSPECIFIED TYPE: ICD-10-CM

## 2021-02-15 DIAGNOSIS — Z79.899 ENCOUNTER FOR LONG-TERM (CURRENT) USE OF OTHER MEDICATIONS: Primary | ICD-10-CM

## 2021-02-15 DIAGNOSIS — I10 ESSENTIAL HYPERTENSION: ICD-10-CM

## 2021-02-15 DIAGNOSIS — G89.4 CHRONIC PAIN SYNDROME: ICD-10-CM

## 2021-02-15 DIAGNOSIS — E78.5 HYPERLIPIDEMIA, UNSPECIFIED HYPERLIPIDEMIA TYPE: ICD-10-CM

## 2021-02-15 PROCEDURE — 3008F BODY MASS INDEX DOCD: CPT | Mod: S$GLB,,, | Performed by: PHYSICIAN ASSISTANT

## 2021-02-15 PROCEDURE — 99214 OFFICE O/P EST MOD 30 MIN: CPT | Mod: S$GLB,,, | Performed by: PHYSICIAN ASSISTANT

## 2021-02-15 PROCEDURE — 3008F PR BODY MASS INDEX (BMI) DOCUMENTED: ICD-10-PCS | Mod: S$GLB,,, | Performed by: PHYSICIAN ASSISTANT

## 2021-02-15 PROCEDURE — 3075F PR MOST RECENT SYSTOLIC BLOOD PRESS GE 130-139MM HG: ICD-10-PCS | Mod: S$GLB,,, | Performed by: PHYSICIAN ASSISTANT

## 2021-02-15 PROCEDURE — 3078F PR MOST RECENT DIASTOLIC BLOOD PRESSURE < 80 MM HG: ICD-10-PCS | Mod: S$GLB,,, | Performed by: PHYSICIAN ASSISTANT

## 2021-02-15 PROCEDURE — 3075F SYST BP GE 130 - 139MM HG: CPT | Mod: S$GLB,,, | Performed by: PHYSICIAN ASSISTANT

## 2021-02-15 PROCEDURE — 3078F DIAST BP <80 MM HG: CPT | Mod: S$GLB,,, | Performed by: PHYSICIAN ASSISTANT

## 2021-02-15 PROCEDURE — 99214 PR OFFICE/OUTPT VISIT, EST, LEVL IV, 30-39 MIN: ICD-10-PCS | Mod: S$GLB,,, | Performed by: PHYSICIAN ASSISTANT

## 2021-02-15 RX ORDER — FLUTICASONE PROPIONATE 50 MCG
1 SPRAY, SUSPENSION (ML) NASAL
Qty: 16 G | Refills: 3 | Status: SHIPPED | OUTPATIENT
Start: 2021-02-15 | End: 2021-11-15 | Stop reason: SDUPTHER

## 2021-02-15 RX ORDER — HYDROCODONE BITARTRATE AND ACETAMINOPHEN 10; 325 MG/1; MG/1
1 TABLET ORAL EVERY 8 HOURS PRN
Qty: 90 TABLET | Refills: 0 | Status: SHIPPED | OUTPATIENT
Start: 2021-02-18 | End: 2021-03-17 | Stop reason: SDUPTHER

## 2021-02-15 RX ORDER — AMLODIPINE BESYLATE 10 MG/1
10 TABLET ORAL DAILY
Qty: 90 TABLET | Refills: 1 | Status: SHIPPED | OUTPATIENT
Start: 2021-02-15 | End: 2021-05-13 | Stop reason: SDUPTHER

## 2021-02-15 RX ORDER — HYDROCODONE BITARTRATE AND ACETAMINOPHEN 10; 325 MG/1; MG/1
1 TABLET ORAL EVERY 8 HOURS PRN
Qty: 90 TABLET | Refills: 0 | Status: SHIPPED | OUTPATIENT
Start: 2021-04-19 | End: 2021-05-03 | Stop reason: ALTCHOICE

## 2021-02-15 RX ORDER — SILDENAFIL 100 MG/1
100 TABLET, FILM COATED ORAL
Qty: 6 TABLET | Refills: 6 | Status: SHIPPED | OUTPATIENT
Start: 2021-02-15 | End: 2021-11-15 | Stop reason: SDUPTHER

## 2021-02-15 RX ORDER — ATORVASTATIN CALCIUM 40 MG/1
40 TABLET, FILM COATED ORAL DAILY
Qty: 90 TABLET | Refills: 1 | Status: SHIPPED | OUTPATIENT
Start: 2021-02-15 | End: 2021-05-13 | Stop reason: SDUPTHER

## 2021-02-15 RX ORDER — HYDROCHLOROTHIAZIDE 25 MG/1
25 TABLET ORAL DAILY
Qty: 90 TABLET | Refills: 1 | Status: SHIPPED | OUTPATIENT
Start: 2021-02-15 | End: 2021-09-02 | Stop reason: SDUPTHER

## 2021-02-15 RX ORDER — DULOXETIN HYDROCHLORIDE 60 MG/1
60 CAPSULE, DELAYED RELEASE ORAL DAILY
Qty: 90 CAPSULE | Refills: 1 | Status: SHIPPED | OUTPATIENT
Start: 2021-02-15 | End: 2021-09-02 | Stop reason: SDUPTHER

## 2021-02-15 RX ORDER — HYDROCODONE BITARTRATE AND ACETAMINOPHEN 10; 325 MG/1; MG/1
1 TABLET ORAL EVERY 8 HOURS PRN
Qty: 90 TABLET | Refills: 0 | Status: SHIPPED | OUTPATIENT
Start: 2021-03-20 | End: 2021-04-19

## 2021-02-15 RX ORDER — PANTOPRAZOLE SODIUM 40 MG/1
40 TABLET, DELAYED RELEASE ORAL DAILY
Qty: 90 TABLET | Refills: 1 | Status: SHIPPED | OUTPATIENT
Start: 2021-02-15 | End: 2021-09-23 | Stop reason: SDUPTHER

## 2021-02-15 RX ORDER — ALPRAZOLAM 1 MG/1
1 TABLET ORAL DAILY
Qty: 60 TABLET | Refills: 2 | Status: SHIPPED | OUTPATIENT
Start: 2021-02-15 | End: 2021-05-04 | Stop reason: SDUPTHER

## 2021-02-18 NOTE — PROGRESS NOTES
Patient:   DENISE MIX            MRN: [id number]            FIN: [id number]               Age:   52 years     Sex:  Female     :  1965   Associated Diagnoses:   None   Author:   Ernestine Brannon      Basic Information   Time seen: Date 2018.   History source: Patient.   Arrival mode: Private vehicle.   History limitation: None.      History of Present Illness   52-year-old female with no significant past medical history presents emergency department with a complaint of a head injury status post getting into a truck around 5 PM.  Patient states she was dazed at first however denies any loss of consciousness, dizziness, visual changes, neck pain, chest pain, shortness of breath, abdominal, back/flank pain, urinary symptoms, any other symptoms at this time.  Patient is not up-to-date on immunizations.          Review of Systems   Constitutional symptoms:  No fever,    Skin symptoms:  No rash,    Eye symptoms:  No recent vision problems,    ENMT symptoms:  No sore throat,    Respiratory symptoms:  No shortness of breath,    Cardiovascular symptoms:  No chest pain,    Gastrointestinal symptoms:  No abdominal pain,    Genitourinary symptoms:  No dysuria,    Musculoskeletal symptoms:  No Joint pain,    Neurologic symptoms:  No headache,    Psychiatric symptoms:  Negative except as documented in HPI.   Endocrine symptoms:  Negative except as documented in HPI.   Hematologic/Lymphatic symptoms:  Negative except as documented in HPI.   Allergy/immunologic symptoms:  Negative except as documented in HPI.      Health Status   Allergies:    Allergic Reactions (Selected)  NKA.   Immunizations: Unknown.      Past Medical/ Family/ Social History      Medical history   Negative.   Surgical history: Negative.   Family history: Reviewed as documented in chart.   Social history: Alcohol use: Denies, Tobacco use: Denies, Drug use: Denies.      Physical Examination               Vital Signs   Vital Signs  Pt here with his wife for flu shot only. DBL      9/19/2018 21:51          Temperature Oral          98.1 F                             Peripheral Pulse Rate     66 bpm                             Respiratory Rate          19 br/min                             Systolic Blood Pressure   160 mmHg  HI                             Diastolic Blood Pressure  90 mmHg                             Mean Arterial Pressure    113 mmHg                             Oxygen Saturation         99 %  .               Per nurse's notes.   Pulse Ox 99% on Room Air which is normal for this patient.   Vital Signs were reviewed.   General:  Alert, no acute distress.    Skin:  Warm, dry.    Head:  Normocephalic, On exam: 4 cm minimally gaping laceration to the right parietal area, not actively bleeding, no foreign body, no raccoon signs, no postauricular ecchymosis, no C-spine tenderness.    Neck:  Supple, trachea midline.    Eye:  Pupils are equal, round and reactive to light, extraocular movements are intact, normal conjunctiva.    Ears, nose, mouth and throat:  Tympanic membranes clear, oral mucosa moist.    Cardiovascular:  Regular rate and rhythm, S1, S2.    Respiratory:  Lungs are clear to auscultation, respirations are non-labored, Symmetrical chest wall expansion.    Gastrointestinal:  Soft, Nontender, Non distended.    Back:  Nontender, Normal range of motion, Normal alignment, no step-offs.    Musculoskeletal:  Normal ROM, normal strength, no tenderness, no swelling, no deformity.    Neurological:  Alert and oriented to person, place, time, and situation, No focal neurological deficit observed, CN II-XII intact, normal sensory observed, normal motor observed, normal speech observed, normal coordination observed.    Psychiatric:  Appropriate mood & affect.      Medical Decision Making   Differential Diagnosis:  Head injury, concussion, intracranial hemorrhage, skull fracture, abrasion, hematoma, laceration, contusion, neck injury, post concussive syndrome.    Radiology results:   Computed tomography, neg per [facility].    52-year-old female with no significant past medical history presents emergency department with a complaint of a head injury status post getting into a truck around 5 PM.  Patient states she was dazed at first however denies any loss of consciousness, dizziness, visual changes, neck pain, chest pain, shortness of breath, abdominal, back/flank pain, urinary symptoms, any other symptoms at this time.  Patient is not up-to-date on immunizations.  Vitals and physical exam as noted above.  Patient is afebrile, no acute distress.  Neurological exam is benign, no focal deficits noted, no hemotympanum, no raccoon signs, no postauricular ecchymosis, no C-spine tenderness.  Wound care management provided.  Signs of infection educated.  Follow-up with primary care physician in 1 or 2 days.  Return for worsening symptoms.      Procedure   Laceration repair   Confirmed: Patient, procedure, side, and site correct.    Consent: Patient.       Description/ repair   See PE .   Shape: irregular.   Depth: subcutaneous.   Details: clean, The wound was carefully explored, and no foreign bodies were noted.     .   Anesthesia: LET (lidocaine, epinephrine, tetracaine) applied topically.   Preparation: sterile field established.   Irrigation: The wound was thoroughly irrigated with normal saline.   Skin closure: # 4 staples, The skin edges were precisely approximated in a single layer .   Complexity: single layer.   Post procedure exam: Circulation, motor, sensory examination intact, Satisfactory wound closure and hemostasis  were achieved.   .    Complications: None.    Patient tolerated: Well.    Performed by: Physician assistant.       Impression and Plan   Diagnosis   Scalp Laceration  Head Injury   Plan   Condition: Improved, Stable.    Disposition: Discharged: to home.    Patient was given the following educational materials: Head Injury, Adult, Easy-to-Read(Bahamian), Stitches, Staples,  or Adhesive Wound Closure, Easy-to-Read(Swedish).    Follow up with: Follow up with primary care provider Wound care management provided  Signs of infection educated  Follow-up with your primary care physician in 1 week for staple removal   Head injury precautions educated    Return for worsening symptoms.

## 2021-02-19 LAB
COMMENT: ABNORMAL
GABAPENTIN UR-MCNC: ABNORMAL NG/ML

## 2021-03-04 ENCOUNTER — TELEPHONE (OUTPATIENT)
Dept: FAMILY MEDICINE | Facility: CLINIC | Age: 61
End: 2021-03-04

## 2021-03-05 ENCOUNTER — TELEPHONE (OUTPATIENT)
Dept: UROLOGY | Facility: CLINIC | Age: 61
End: 2021-03-05

## 2021-03-15 ENCOUNTER — OFFICE VISIT (OUTPATIENT)
Dept: UROLOGY | Facility: CLINIC | Age: 61
End: 2021-03-15
Payer: MEDICARE

## 2021-03-15 ENCOUNTER — OFFICE VISIT (OUTPATIENT)
Dept: DERMATOLOGY | Facility: CLINIC | Age: 61
End: 2021-03-15
Payer: MEDICARE

## 2021-03-15 VITALS
SYSTOLIC BLOOD PRESSURE: 146 MMHG | DIASTOLIC BLOOD PRESSURE: 87 MMHG | WEIGHT: 222.69 LBS | BODY MASS INDEX: 34.95 KG/M2 | HEART RATE: 98 BPM | HEIGHT: 67 IN

## 2021-03-15 DIAGNOSIS — L30.9 HAND ECZEMA: ICD-10-CM

## 2021-03-15 DIAGNOSIS — N40.0 BPH WITHOUT OBSTRUCTION/LOWER URINARY TRACT SYMPTOMS: Primary | ICD-10-CM

## 2021-03-15 DIAGNOSIS — D22.9 MULTIPLE BENIGN NEVI: ICD-10-CM

## 2021-03-15 DIAGNOSIS — D48.5 NEOPLASM OF UNCERTAIN BEHAVIOR OF SKIN: ICD-10-CM

## 2021-03-15 DIAGNOSIS — N50.89 SCROTAL MASS: ICD-10-CM

## 2021-03-15 DIAGNOSIS — L82.1 SEBORRHEIC KERATOSIS: ICD-10-CM

## 2021-03-15 DIAGNOSIS — Z12.5 PROSTATE CANCER SCREENING: ICD-10-CM

## 2021-03-15 DIAGNOSIS — H01.139 ECZEMA OF EYELID, UNSPECIFIED LATERALITY: Primary | ICD-10-CM

## 2021-03-15 DIAGNOSIS — N41.0 PROSTATITIS, ACUTE: ICD-10-CM

## 2021-03-15 DIAGNOSIS — L57.0 ACTINIC KERATOSIS: ICD-10-CM

## 2021-03-15 LAB — POC RESIDUAL URINE VOLUME: 29 ML (ref 0–100)

## 2021-03-15 PROCEDURE — 17000 PR DESTRUCTION(LASER SURGERY,CRYOSURGERY,CHEMOSURGERY),PREMALIGNANT LESIONS,FIRST LESION: ICD-10-PCS | Mod: 59,S$GLB,, | Performed by: STUDENT IN AN ORGANIZED HEALTH CARE EDUCATION/TRAINING PROGRAM

## 2021-03-15 PROCEDURE — 3077F PR MOST RECENT SYSTOLIC BLOOD PRESSURE >= 140 MM HG: ICD-10-PCS | Mod: CPTII,S$GLB,, | Performed by: STUDENT IN AN ORGANIZED HEALTH CARE EDUCATION/TRAINING PROGRAM

## 2021-03-15 PROCEDURE — 99215 PR OFFICE/OUTPT VISIT, EST, LEVL V, 40-54 MIN: ICD-10-PCS | Mod: S$GLB,,, | Performed by: UROLOGY

## 2021-03-15 PROCEDURE — 88305 TISSUE EXAM BY PATHOLOGIST: CPT | Mod: 26,,, | Performed by: PATHOLOGY

## 2021-03-15 PROCEDURE — 11102 TANGNTL BX SKIN SINGLE LES: CPT | Mod: S$GLB,,, | Performed by: STUDENT IN AN ORGANIZED HEALTH CARE EDUCATION/TRAINING PROGRAM

## 2021-03-15 PROCEDURE — 3078F PR MOST RECENT DIASTOLIC BLOOD PRESSURE < 80 MM HG: ICD-10-PCS | Mod: CPTII,S$GLB,, | Performed by: STUDENT IN AN ORGANIZED HEALTH CARE EDUCATION/TRAINING PROGRAM

## 2021-03-15 PROCEDURE — 17003 DESTRUCTION, PREMALIGNANT LESIONS; SECOND THROUGH 14 LESIONS: ICD-10-PCS | Mod: S$GLB,,, | Performed by: STUDENT IN AN ORGANIZED HEALTH CARE EDUCATION/TRAINING PROGRAM

## 2021-03-15 PROCEDURE — 99999 PR PBB SHADOW E&M-EST. PATIENT-LVL III: ICD-10-PCS | Mod: PBBFAC,,, | Performed by: STUDENT IN AN ORGANIZED HEALTH CARE EDUCATION/TRAINING PROGRAM

## 2021-03-15 PROCEDURE — 3079F PR MOST RECENT DIASTOLIC BLOOD PRESSURE 80-89 MM HG: ICD-10-PCS | Mod: CPTII,S$GLB,, | Performed by: UROLOGY

## 2021-03-15 PROCEDURE — 88342 IMHCHEM/IMCYTCHM 1ST ANTB: CPT | Mod: 26,,, | Performed by: PATHOLOGY

## 2021-03-15 PROCEDURE — 17003 DESTRUCT PREMALG LES 2-14: CPT | Mod: S$GLB,,, | Performed by: STUDENT IN AN ORGANIZED HEALTH CARE EDUCATION/TRAINING PROGRAM

## 2021-03-15 PROCEDURE — 1126F PR PAIN SEVERITY QUANTIFIED, NO PAIN PRESENT: ICD-10-PCS | Mod: S$GLB,,, | Performed by: UROLOGY

## 2021-03-15 PROCEDURE — 99214 PR OFFICE/OUTPT VISIT, EST, LEVL IV, 30-39 MIN: ICD-10-PCS | Mod: 25,S$GLB,, | Performed by: STUDENT IN AN ORGANIZED HEALTH CARE EDUCATION/TRAINING PROGRAM

## 2021-03-15 PROCEDURE — 99999 PR PBB SHADOW E&M-EST. PATIENT-LVL V: CPT | Mod: PBBFAC,,, | Performed by: UROLOGY

## 2021-03-15 PROCEDURE — 51798 US URINE CAPACITY MEASURE: CPT | Mod: S$GLB,,, | Performed by: UROLOGY

## 2021-03-15 PROCEDURE — 17000 DESTRUCT PREMALG LESION: CPT | Mod: 59,S$GLB,, | Performed by: STUDENT IN AN ORGANIZED HEALTH CARE EDUCATION/TRAINING PROGRAM

## 2021-03-15 PROCEDURE — 99999 PR PBB SHADOW E&M-EST. PATIENT-LVL V: ICD-10-PCS | Mod: PBBFAC,,, | Performed by: UROLOGY

## 2021-03-15 PROCEDURE — 51798 POCT BLADDER SCAN: ICD-10-PCS | Mod: S$GLB,,, | Performed by: UROLOGY

## 2021-03-15 PROCEDURE — 88305 TISSUE EXAM BY PATHOLOGIST: CPT | Performed by: PATHOLOGY

## 2021-03-15 PROCEDURE — 3078F DIAST BP <80 MM HG: CPT | Mod: CPTII,S$GLB,, | Performed by: STUDENT IN AN ORGANIZED HEALTH CARE EDUCATION/TRAINING PROGRAM

## 2021-03-15 PROCEDURE — 88305 TISSUE EXAM BY PATHOLOGIST: ICD-10-PCS | Mod: 26,,, | Performed by: PATHOLOGY

## 2021-03-15 PROCEDURE — 3079F DIAST BP 80-89 MM HG: CPT | Mod: CPTII,S$GLB,, | Performed by: UROLOGY

## 2021-03-15 PROCEDURE — 3077F PR MOST RECENT SYSTOLIC BLOOD PRESSURE >= 140 MM HG: ICD-10-PCS | Mod: CPTII,S$GLB,, | Performed by: UROLOGY

## 2021-03-15 PROCEDURE — 99215 OFFICE O/P EST HI 40 MIN: CPT | Mod: S$GLB,,, | Performed by: UROLOGY

## 2021-03-15 PROCEDURE — 3077F SYST BP >= 140 MM HG: CPT | Mod: CPTII,S$GLB,, | Performed by: UROLOGY

## 2021-03-15 PROCEDURE — 1126F AMNT PAIN NOTED NONE PRSNT: CPT | Mod: S$GLB,,, | Performed by: UROLOGY

## 2021-03-15 PROCEDURE — 11102 PR TANGENTIAL BIOPSY, SKIN, SINGLE LESION: ICD-10-PCS | Mod: S$GLB,,, | Performed by: STUDENT IN AN ORGANIZED HEALTH CARE EDUCATION/TRAINING PROGRAM

## 2021-03-15 PROCEDURE — 3008F BODY MASS INDEX DOCD: CPT | Mod: CPTII,S$GLB,, | Performed by: UROLOGY

## 2021-03-15 PROCEDURE — 99999 PR PBB SHADOW E&M-EST. PATIENT-LVL III: CPT | Mod: PBBFAC,,, | Performed by: STUDENT IN AN ORGANIZED HEALTH CARE EDUCATION/TRAINING PROGRAM

## 2021-03-15 PROCEDURE — 99214 OFFICE O/P EST MOD 30 MIN: CPT | Mod: 25,S$GLB,, | Performed by: STUDENT IN AN ORGANIZED HEALTH CARE EDUCATION/TRAINING PROGRAM

## 2021-03-15 PROCEDURE — 88342 IMHCHEM/IMCYTCHM 1ST ANTB: CPT | Performed by: PATHOLOGY

## 2021-03-15 PROCEDURE — 3008F PR BODY MASS INDEX (BMI) DOCUMENTED: ICD-10-PCS | Mod: CPTII,S$GLB,, | Performed by: UROLOGY

## 2021-03-15 PROCEDURE — 3077F SYST BP >= 140 MM HG: CPT | Mod: CPTII,S$GLB,, | Performed by: STUDENT IN AN ORGANIZED HEALTH CARE EDUCATION/TRAINING PROGRAM

## 2021-03-15 PROCEDURE — 88342 CHG IMMUNOCYTOCHEMISTRY: ICD-10-PCS | Mod: 26,,, | Performed by: PATHOLOGY

## 2021-03-15 RX ORDER — TRIAMCINOLONE ACETONIDE 0.25 MG/G
CREAM TOPICAL 2 TIMES DAILY
Qty: 80 G | Refills: 0 | Status: SHIPPED | OUTPATIENT
Start: 2021-03-15

## 2021-03-15 RX ORDER — DOXYCYCLINE 100 MG/1
100 CAPSULE ORAL 2 TIMES DAILY
Qty: 28 CAPSULE | Refills: 0 | Status: SHIPPED | OUTPATIENT
Start: 2021-03-15 | End: 2021-05-03

## 2021-03-15 RX ORDER — TAMSULOSIN HYDROCHLORIDE 0.4 MG/1
0.4 CAPSULE ORAL NIGHTLY
Qty: 30 CAPSULE | Refills: 11 | Status: SHIPPED | OUTPATIENT
Start: 2021-03-15 | End: 2023-11-17

## 2021-03-15 RX ORDER — BETAMETHASONE DIPROPIONATE 0.5 MG/G
CREAM TOPICAL
Qty: 50 G | Refills: 1 | Status: SHIPPED | OUTPATIENT
Start: 2021-03-15

## 2021-03-17 RX ORDER — HYDROCODONE BITARTRATE AND ACETAMINOPHEN 10; 325 MG/1; MG/1
1 TABLET ORAL EVERY 8 HOURS PRN
Qty: 90 TABLET | Refills: 0 | Status: SHIPPED | OUTPATIENT
Start: 2021-03-17 | End: 2021-04-16 | Stop reason: SDUPTHER

## 2021-03-18 LAB
FINAL PATHOLOGIC DIAGNOSIS: NORMAL
GROSS: NORMAL
MICROSCOPIC EXAM: NORMAL

## 2021-03-21 ENCOUNTER — TELEPHONE (OUTPATIENT)
Dept: UROLOGY | Facility: CLINIC | Age: 61
End: 2021-03-21

## 2021-03-25 RX ORDER — CYCLOBENZAPRINE HCL 5 MG
5 TABLET ORAL 3 TIMES DAILY PRN
Qty: 90 TABLET | Refills: 2 | Status: SHIPPED | OUTPATIENT
Start: 2021-03-25 | End: 2021-11-15 | Stop reason: SDUPTHER

## 2021-03-26 ENCOUNTER — TELEPHONE (OUTPATIENT)
Dept: FAMILY MEDICINE | Facility: CLINIC | Age: 61
End: 2021-03-26

## 2021-03-27 LAB
ALBUMIN SERPL-MCNC: 4.2 G/DL (ref 3.6–5.1)
ALBUMIN/CREAT UR: 6 MCG/MG CREAT
ALBUMIN/GLOB SERPL: 1.6 (CALC) (ref 1–2.5)
ALP SERPL-CCNC: 114 U/L (ref 35–144)
ALT SERPL-CCNC: 39 U/L (ref 9–46)
APPEARANCE UR: CLEAR
AST SERPL-CCNC: 19 U/L (ref 10–35)
BACTERIA #/AREA URNS HPF: NORMAL /HPF
BACTERIA UR CULT: NORMAL
BASOPHILS # BLD AUTO: 23 CELLS/UL (ref 0–200)
BASOPHILS NFR BLD AUTO: 0.4 %
BILIRUB SERPL-MCNC: 0.8 MG/DL (ref 0.2–1.2)
BILIRUB UR QL STRIP: NEGATIVE
BUN SERPL-MCNC: 18 MG/DL (ref 7–25)
BUN/CREAT SERPL: ABNORMAL (CALC) (ref 6–22)
CALCIUM SERPL-MCNC: 9.5 MG/DL (ref 8.6–10.3)
CHLORIDE SERPL-SCNC: 103 MMOL/L (ref 98–110)
CHOLEST SERPL-MCNC: 172 MG/DL
CHOLEST/HDLC SERPL: 3.7 (CALC)
CO2 SERPL-SCNC: 27 MMOL/L (ref 20–32)
COLOR UR: YELLOW
CREAT SERPL-MCNC: 0.74 MG/DL (ref 0.7–1.33)
CREAT UR-MCNC: 120 MG/DL (ref 20–320)
EOSINOPHIL # BLD AUTO: 80 CELLS/UL (ref 15–500)
EOSINOPHIL NFR BLD AUTO: 1.4 %
ERYTHROCYTE [DISTWIDTH] IN BLOOD BY AUTOMATED COUNT: 12.6 % (ref 11–15)
GFRSERPLBLD MDRD-ARVRAT: 102 ML/MIN/1.73M2
GLOBULIN SER CALC-MCNC: 2.6 G/DL (CALC) (ref 1.9–3.7)
GLUCOSE SERPL-MCNC: 104 MG/DL (ref 65–99)
GLUCOSE UR QL STRIP: NEGATIVE
HCT VFR BLD AUTO: 47.8 % (ref 38.5–50)
HDLC SERPL-MCNC: 46 MG/DL
HGB BLD-MCNC: 16 G/DL (ref 13.2–17.1)
HGB UR QL STRIP: NEGATIVE
HYALINE CASTS #/AREA URNS LPF: NORMAL /LPF
KETONES UR QL STRIP: NEGATIVE
LDLC SERPL CALC-MCNC: 105 MG/DL (CALC)
LEUKOCYTE ESTERASE UR QL STRIP: NEGATIVE
LYMPHOCYTES # BLD AUTO: 1265 CELLS/UL (ref 850–3900)
LYMPHOCYTES NFR BLD AUTO: 22.2 %
MCH RBC QN AUTO: 28.7 PG (ref 27–33)
MCHC RBC AUTO-ENTMCNC: 33.5 G/DL (ref 32–36)
MCV RBC AUTO: 85.7 FL (ref 80–100)
MICROALBUMIN UR-MCNC: 0.7 MG/DL
MONOCYTES # BLD AUTO: 530 CELLS/UL (ref 200–950)
MONOCYTES NFR BLD AUTO: 9.3 %
NEUTROPHILS # BLD AUTO: 3802 CELLS/UL (ref 1500–7800)
NEUTROPHILS NFR BLD AUTO: 66.7 %
NITRITE UR QL STRIP: NEGATIVE
NONHDLC SERPL-MCNC: 126 MG/DL (CALC)
PH UR STRIP: 6.5 [PH] (ref 5–8)
PLATELET # BLD AUTO: 248 THOUSAND/UL (ref 140–400)
PMV BLD REES-ECKER: 10.2 FL (ref 7.5–12.5)
POTASSIUM SERPL-SCNC: 3.9 MMOL/L (ref 3.5–5.3)
PROT SERPL-MCNC: 6.8 G/DL (ref 6.1–8.1)
PROT UR QL STRIP: NEGATIVE
PSA SERPL-MCNC: 0.8 NG/ML
RBC # BLD AUTO: 5.58 MILLION/UL (ref 4.2–5.8)
RBC #/AREA URNS HPF: NORMAL /HPF
SODIUM SERPL-SCNC: 140 MMOL/L (ref 135–146)
SP GR UR STRIP: 1.02 (ref 1–1.03)
SQUAMOUS #/AREA URNS HPF: NORMAL /HPF
TRIGL SERPL-MCNC: 111 MG/DL
TSH SERPL-ACNC: 0.96 MIU/L (ref 0.4–4.5)
WBC # BLD AUTO: 5.7 THOUSAND/UL (ref 3.8–10.8)
WBC #/AREA URNS HPF: NORMAL /HPF

## 2021-03-29 ENCOUNTER — HOSPITAL ENCOUNTER (OUTPATIENT)
Dept: RADIOLOGY | Facility: HOSPITAL | Age: 61
Discharge: HOME | End: 2021-03-29
Attending: UROLOGY
Payer: MEDICARE

## 2021-03-29 DIAGNOSIS — N50.89 SCROTAL MASS: ICD-10-CM

## 2021-03-29 PROCEDURE — 76870 US EXAM SCROTUM: CPT | Mod: TC

## 2021-03-29 PROCEDURE — 76870 US SCROTUM AND TESTICLES: ICD-10-PCS | Mod: 26,,, | Performed by: RADIOLOGY

## 2021-03-29 PROCEDURE — 76870 US EXAM SCROTUM: CPT | Mod: 26,,, | Performed by: RADIOLOGY

## 2021-04-03 ENCOUNTER — LAB VISIT (OUTPATIENT)
Dept: PRIMARY CARE CLINIC | Facility: CLINIC | Age: 61
End: 2021-04-03
Payer: MEDICARE

## 2021-04-03 DIAGNOSIS — N40.0 BPH WITHOUT OBSTRUCTION/LOWER URINARY TRACT SYMPTOMS: ICD-10-CM

## 2021-04-03 PROCEDURE — U0003 INFECTIOUS AGENT DETECTION BY NUCLEIC ACID (DNA OR RNA); SEVERE ACUTE RESPIRATORY SYNDROME CORONAVIRUS 2 (SARS-COV-2) (CORONAVIRUS DISEASE [COVID-19]), AMPLIFIED PROBE TECHNIQUE, MAKING USE OF HIGH THROUGHPUT TECHNOLOGIES AS DESCRIBED BY CMS-2020-01-R: HCPCS | Performed by: UROLOGY

## 2021-04-03 PROCEDURE — U0005 INFEC AGEN DETEC AMPLI PROBE: HCPCS | Performed by: UROLOGY

## 2021-04-04 LAB — SARS-COV-2 RNA RESP QL NAA+PROBE: NOT DETECTED

## 2021-04-05 ENCOUNTER — TELEPHONE (OUTPATIENT)
Dept: FAMILY MEDICINE | Facility: CLINIC | Age: 61
End: 2021-04-05

## 2021-04-06 ENCOUNTER — HOSPITAL ENCOUNTER (OUTPATIENT)
Facility: AMBULARY SURGERY CENTER | Age: 61
Discharge: HOME | End: 2021-04-06
Attending: UROLOGY | Admitting: UROLOGY
Payer: MEDICARE

## 2021-04-06 DIAGNOSIS — N40.0 BPH WITHOUT OBSTRUCTION/LOWER URINARY TRACT SYMPTOMS: Primary | ICD-10-CM

## 2021-04-06 DIAGNOSIS — N13.8 BPH WITH OBSTRUCTION/LOWER URINARY TRACT SYMPTOMS: Primary | ICD-10-CM

## 2021-04-06 DIAGNOSIS — N40.0 BPH WITHOUT OBSTRUCTION/LOWER URINARY TRACT SYMPTOMS: ICD-10-CM

## 2021-04-06 DIAGNOSIS — N40.1 BPH WITH OBSTRUCTION/LOWER URINARY TRACT SYMPTOMS: Primary | ICD-10-CM

## 2021-04-06 DIAGNOSIS — R58 BLEEDING: ICD-10-CM

## 2021-04-06 PROCEDURE — 52000 CYSTOURETHROSCOPY: CPT | Mod: ,,, | Performed by: UROLOGY

## 2021-04-06 PROCEDURE — 52000 PR CYSTOURETHROSCOPY: ICD-10-PCS | Mod: ,,, | Performed by: UROLOGY

## 2021-04-06 PROCEDURE — 52000 CYSTOURETHROSCOPY: CPT | Performed by: UROLOGY

## 2021-04-06 RX ORDER — WATER 1 ML/ML
IRRIGANT IRRIGATION
Status: DISCONTINUED | OUTPATIENT
Start: 2021-04-06 | End: 2021-04-06 | Stop reason: HOSPADM

## 2021-04-06 RX ORDER — CIPROFLOXACIN 500 MG/1
500 TABLET ORAL 2 TIMES DAILY
Qty: 4 TABLET | Refills: 0 | Status: SHIPPED | OUTPATIENT
Start: 2021-04-06 | End: 2021-05-03

## 2021-04-06 RX ORDER — LIDOCAINE HYDROCHLORIDE 20 MG/ML
JELLY TOPICAL
Status: DISCONTINUED | OUTPATIENT
Start: 2021-04-06 | End: 2021-04-06 | Stop reason: HOSPADM

## 2021-04-08 VITALS
OXYGEN SATURATION: 97 % | BODY MASS INDEX: 34.87 KG/M2 | HEART RATE: 93 BPM | DIASTOLIC BLOOD PRESSURE: 84 MMHG | SYSTOLIC BLOOD PRESSURE: 140 MMHG | RESPIRATION RATE: 18 BRPM | WEIGHT: 222.69 LBS | TEMPERATURE: 98 F

## 2021-04-16 RX ORDER — HYDROCODONE BITARTRATE AND ACETAMINOPHEN 10; 325 MG/1; MG/1
1 TABLET ORAL EVERY 8 HOURS PRN
Qty: 90 TABLET | Refills: 0 | Status: SHIPPED | OUTPATIENT
Start: 2021-04-16 | End: 2021-05-17 | Stop reason: SDUPTHER

## 2021-04-29 ENCOUNTER — PATIENT MESSAGE (OUTPATIENT)
Dept: RESEARCH | Facility: HOSPITAL | Age: 61
End: 2021-04-29

## 2021-05-03 ENCOUNTER — HOSPITAL ENCOUNTER (OUTPATIENT)
Dept: RADIOLOGY | Facility: HOSPITAL | Age: 61
Discharge: HOME | End: 2021-05-03
Attending: UROLOGY
Payer: MEDICARE

## 2021-05-03 ENCOUNTER — HOSPITAL ENCOUNTER (OUTPATIENT)
Dept: PREADMISSION TESTING | Facility: HOSPITAL | Age: 61
Discharge: HOME | End: 2021-05-03
Attending: UROLOGY
Payer: MEDICARE

## 2021-05-03 DIAGNOSIS — N13.8 BPH WITH OBSTRUCTION/LOWER URINARY TRACT SYMPTOMS: ICD-10-CM

## 2021-05-03 DIAGNOSIS — N40.1 BPH WITH OBSTRUCTION/LOWER URINARY TRACT SYMPTOMS: ICD-10-CM

## 2021-05-03 PROCEDURE — 93005 ELECTROCARDIOGRAM TRACING: CPT

## 2021-05-03 PROCEDURE — 99900103 DSU ONLY-NO CHARGE-INITIAL HR (STAT)

## 2021-05-03 PROCEDURE — 71046 X-RAY EXAM CHEST 2 VIEWS: CPT | Mod: 26,,, | Performed by: RADIOLOGY

## 2021-05-03 PROCEDURE — 71046 X-RAY EXAM CHEST 2 VIEWS: CPT | Mod: TC,FY

## 2021-05-03 PROCEDURE — 71046 XR CHEST PA AND LATERAL: ICD-10-PCS | Mod: 26,,, | Performed by: RADIOLOGY

## 2021-05-03 PROCEDURE — 99900104 DSU ONLY-NO CHARGE-EA ADD'L HR (STAT)

## 2021-05-04 DIAGNOSIS — F41.9 ANXIETY DISORDER, UNSPECIFIED TYPE: ICD-10-CM

## 2021-05-04 RX ORDER — ALPRAZOLAM 1 MG/1
1 TABLET ORAL DAILY
Qty: 60 TABLET | Refills: 2 | Status: SHIPPED | OUTPATIENT
Start: 2021-05-04 | End: 2021-09-02 | Stop reason: SDUPTHER

## 2021-05-09 RX ORDER — AMPICILLIN 500 MG/1
500 CAPSULE ORAL 3 TIMES DAILY
Qty: 21 CAPSULE | Refills: 0 | Status: SHIPPED | OUTPATIENT
Start: 2021-05-09 | End: 2021-05-16

## 2021-05-10 DIAGNOSIS — N40.1 BPH WITH OBSTRUCTION/LOWER URINARY TRACT SYMPTOMS: Primary | ICD-10-CM

## 2021-05-10 DIAGNOSIS — N13.8 BPH WITH OBSTRUCTION/LOWER URINARY TRACT SYMPTOMS: Primary | ICD-10-CM

## 2021-05-13 ENCOUNTER — OFFICE VISIT (OUTPATIENT)
Dept: FAMILY MEDICINE | Facility: CLINIC | Age: 61
End: 2021-05-13
Payer: MEDICARE

## 2021-05-13 VITALS
BODY MASS INDEX: 34.63 KG/M2 | HEIGHT: 67 IN | DIASTOLIC BLOOD PRESSURE: 78 MMHG | HEART RATE: 96 BPM | OXYGEN SATURATION: 96 % | WEIGHT: 220.63 LBS | SYSTOLIC BLOOD PRESSURE: 136 MMHG

## 2021-05-13 DIAGNOSIS — E78.5 HYPERLIPIDEMIA, UNSPECIFIED HYPERLIPIDEMIA TYPE: Primary | ICD-10-CM

## 2021-05-13 DIAGNOSIS — G89.4 CHRONIC PAIN SYNDROME: ICD-10-CM

## 2021-05-13 DIAGNOSIS — I10 ESSENTIAL HYPERTENSION: ICD-10-CM

## 2021-05-13 PROCEDURE — 99214 PR OFFICE/OUTPT VISIT, EST, LEVL IV, 30-39 MIN: ICD-10-PCS | Mod: S$GLB,,, | Performed by: PHYSICIAN ASSISTANT

## 2021-05-13 PROCEDURE — 3078F PR MOST RECENT DIASTOLIC BLOOD PRESSURE < 80 MM HG: ICD-10-PCS | Mod: S$GLB,,, | Performed by: PHYSICIAN ASSISTANT

## 2021-05-13 PROCEDURE — 3075F SYST BP GE 130 - 139MM HG: CPT | Mod: S$GLB,,, | Performed by: PHYSICIAN ASSISTANT

## 2021-05-13 PROCEDURE — 3008F BODY MASS INDEX DOCD: CPT | Mod: S$GLB,,, | Performed by: PHYSICIAN ASSISTANT

## 2021-05-13 PROCEDURE — 3008F PR BODY MASS INDEX (BMI) DOCUMENTED: ICD-10-PCS | Mod: S$GLB,,, | Performed by: PHYSICIAN ASSISTANT

## 2021-05-13 PROCEDURE — 3078F DIAST BP <80 MM HG: CPT | Mod: S$GLB,,, | Performed by: PHYSICIAN ASSISTANT

## 2021-05-13 PROCEDURE — 3075F PR MOST RECENT SYSTOLIC BLOOD PRESS GE 130-139MM HG: ICD-10-PCS | Mod: S$GLB,,, | Performed by: PHYSICIAN ASSISTANT

## 2021-05-13 PROCEDURE — 99214 OFFICE O/P EST MOD 30 MIN: CPT | Mod: S$GLB,,, | Performed by: PHYSICIAN ASSISTANT

## 2021-05-13 RX ORDER — AMLODIPINE BESYLATE 10 MG/1
10 TABLET ORAL DAILY
Qty: 90 TABLET | Refills: 1 | Status: SHIPPED | OUTPATIENT
Start: 2021-05-13 | End: 2021-11-15 | Stop reason: SDUPTHER

## 2021-05-13 RX ORDER — GABAPENTIN 300 MG/1
900 CAPSULE ORAL 3 TIMES DAILY
Qty: 810 CAPSULE | Refills: 1 | Status: SHIPPED | OUTPATIENT
Start: 2021-05-13 | End: 2021-11-15 | Stop reason: SDUPTHER

## 2021-05-13 RX ORDER — ATORVASTATIN CALCIUM 40 MG/1
40 TABLET, FILM COATED ORAL DAILY
Qty: 90 TABLET | Refills: 1 | Status: SHIPPED | OUTPATIENT
Start: 2021-05-13 | End: 2021-11-15 | Stop reason: SDUPTHER

## 2021-05-17 RX ORDER — HYDROCODONE BITARTRATE AND ACETAMINOPHEN 10; 325 MG/1; MG/1
1 TABLET ORAL EVERY 8 HOURS PRN
Qty: 90 TABLET | Refills: 0 | Status: SHIPPED | OUTPATIENT
Start: 2021-05-17 | End: 2021-06-16 | Stop reason: SDUPTHER

## 2021-05-19 ENCOUNTER — CLINICAL SUPPORT (OUTPATIENT)
Dept: UROLOGY | Facility: CLINIC | Age: 61
End: 2021-05-19
Payer: MEDICARE

## 2021-05-19 DIAGNOSIS — R82.998 CELLS AND CASTS IN URINE: Primary | ICD-10-CM

## 2021-05-19 LAB
BILIRUB UR QL STRIP: NEGATIVE
CLARITY UR: CLEAR
COLOR UR: YELLOW
GLUCOSE UR QL STRIP: NEGATIVE
HGB UR QL STRIP: NEGATIVE
KETONES UR QL STRIP: NEGATIVE
LEUKOCYTE ESTERASE UR QL STRIP: NEGATIVE
NITRITE UR QL STRIP: NEGATIVE
PH UR STRIP: 6 [PH] (ref 5–8)
PROT UR QL STRIP: NEGATIVE
SP GR UR STRIP: 1.02 (ref 1–1.03)
URN SPEC COLLECT METH UR: NORMAL
UROBILINOGEN UR STRIP-ACNC: NEGATIVE EU/DL

## 2021-05-19 PROCEDURE — 99499 NO LOS: ICD-10-PCS | Mod: S$GLB,,, | Performed by: UROLOGY

## 2021-05-19 PROCEDURE — 87086 URINE CULTURE/COLONY COUNT: CPT | Performed by: UROLOGY

## 2021-05-19 PROCEDURE — 99499 UNLISTED E&M SERVICE: CPT | Mod: S$GLB,,, | Performed by: UROLOGY

## 2021-05-19 PROCEDURE — 81003 URINALYSIS AUTO W/O SCOPE: CPT | Performed by: UROLOGY

## 2021-05-21 LAB — BACTERIA UR CULT: NO GROWTH

## 2021-05-24 ENCOUNTER — LAB VISIT (OUTPATIENT)
Dept: PRIMARY CARE CLINIC | Facility: CLINIC | Age: 61
End: 2021-05-24
Payer: MEDICARE

## 2021-05-24 DIAGNOSIS — N40.1 BPH WITH OBSTRUCTION/LOWER URINARY TRACT SYMPTOMS: ICD-10-CM

## 2021-05-24 DIAGNOSIS — N13.8 BPH WITH OBSTRUCTION/LOWER URINARY TRACT SYMPTOMS: ICD-10-CM

## 2021-05-24 PROCEDURE — U0005 INFEC AGEN DETEC AMPLI PROBE: HCPCS | Performed by: UROLOGY

## 2021-05-24 PROCEDURE — U0003 INFECTIOUS AGENT DETECTION BY NUCLEIC ACID (DNA OR RNA); SEVERE ACUTE RESPIRATORY SYNDROME CORONAVIRUS 2 (SARS-COV-2) (CORONAVIRUS DISEASE [COVID-19]), AMPLIFIED PROBE TECHNIQUE, MAKING USE OF HIGH THROUGHPUT TECHNOLOGIES AS DESCRIBED BY CMS-2020-01-R: HCPCS | Performed by: UROLOGY

## 2021-05-25 LAB — SARS-COV-2 RNA RESP QL NAA+PROBE: NOT DETECTED

## 2021-05-26 ENCOUNTER — ANESTHESIA EVENT (OUTPATIENT)
Dept: SURGERY | Facility: HOSPITAL | Age: 61
End: 2021-05-26
Payer: MEDICARE

## 2021-05-26 RX ORDER — SODIUM CHLORIDE, SODIUM LACTATE, POTASSIUM CHLORIDE, CALCIUM CHLORIDE 600; 310; 30; 20 MG/100ML; MG/100ML; MG/100ML; MG/100ML
INJECTION, SOLUTION INTRAVENOUS CONTINUOUS
Status: CANCELLED | OUTPATIENT
Start: 2021-05-26

## 2021-05-27 ENCOUNTER — ANESTHESIA (OUTPATIENT)
Dept: SURGERY | Facility: HOSPITAL | Age: 61
End: 2021-05-27
Payer: MEDICARE

## 2021-05-27 ENCOUNTER — HOSPITAL ENCOUNTER (OUTPATIENT)
Facility: HOSPITAL | Age: 61
Discharge: HOME | End: 2021-05-27
Attending: UROLOGY | Admitting: UROLOGY
Payer: MEDICARE

## 2021-05-27 DIAGNOSIS — N13.8 BPH WITH OBSTRUCTION/LOWER URINARY TRACT SYMPTOMS: ICD-10-CM

## 2021-05-27 DIAGNOSIS — N40.1 BPH WITH OBSTRUCTION/LOWER URINARY TRACT SYMPTOMS: ICD-10-CM

## 2021-05-27 DIAGNOSIS — N40.0 BPH WITHOUT OBSTRUCTION/LOWER URINARY TRACT SYMPTOMS: Primary | ICD-10-CM

## 2021-05-27 PROCEDURE — 37000009 HC ANESTHESIA EA ADD 15 MINS: Performed by: UROLOGY

## 2021-05-27 PROCEDURE — 52601 PROSTATECTOMY (TURP): CPT | Mod: ,,, | Performed by: UROLOGY

## 2021-05-27 PROCEDURE — 71000016 HC POSTOP RECOV ADDL HR: Performed by: UROLOGY

## 2021-05-27 PROCEDURE — 25000003 PHARM REV CODE 250: Performed by: ANESTHESIOLOGY

## 2021-05-27 PROCEDURE — 52601 PR TRANSURETHRAL ELEC-SURG PROSTATECTOM: ICD-10-PCS | Mod: ,,, | Performed by: UROLOGY

## 2021-05-27 PROCEDURE — 99900103 DSU ONLY-NO CHARGE-INITIAL HR (STAT): Performed by: UROLOGY

## 2021-05-27 PROCEDURE — 63600175 PHARM REV CODE 636 W HCPCS: Performed by: ANESTHESIOLOGY

## 2021-05-27 PROCEDURE — 27201423 OPTIME MED/SURG SUP & DEVICES STERILE SUPPLY: Performed by: UROLOGY

## 2021-05-27 PROCEDURE — 71000033 HC RECOVERY, INTIAL HOUR: Performed by: UROLOGY

## 2021-05-27 PROCEDURE — D9220A PRA ANESTHESIA: ICD-10-PCS | Mod: ANES,,, | Performed by: ANESTHESIOLOGY

## 2021-05-27 PROCEDURE — 36000707: Performed by: UROLOGY

## 2021-05-27 PROCEDURE — D9220A PRA ANESTHESIA: Mod: CRNA,,, | Performed by: NURSE ANESTHETIST, CERTIFIED REGISTERED

## 2021-05-27 PROCEDURE — 27200651 HC AIRWAY, LMA: Performed by: ANESTHESIOLOGY

## 2021-05-27 PROCEDURE — 37000008 HC ANESTHESIA 1ST 15 MINUTES: Performed by: UROLOGY

## 2021-05-27 PROCEDURE — 36000706: Performed by: UROLOGY

## 2021-05-27 PROCEDURE — 71000015 HC POSTOP RECOV 1ST HR: Performed by: UROLOGY

## 2021-05-27 PROCEDURE — 63600175 PHARM REV CODE 636 W HCPCS: Performed by: UROLOGY

## 2021-05-27 PROCEDURE — D9220A PRA ANESTHESIA: Mod: ANES,,, | Performed by: ANESTHESIOLOGY

## 2021-05-27 PROCEDURE — 25000003 PHARM REV CODE 250: Performed by: NURSE ANESTHETIST, CERTIFIED REGISTERED

## 2021-05-27 PROCEDURE — 71000039 HC RECOVERY, EACH ADD'L HOUR: Performed by: UROLOGY

## 2021-05-27 PROCEDURE — 63600175 PHARM REV CODE 636 W HCPCS: Performed by: NURSE ANESTHETIST, CERTIFIED REGISTERED

## 2021-05-27 PROCEDURE — D9220A PRA ANESTHESIA: ICD-10-PCS | Mod: CRNA,,, | Performed by: NURSE ANESTHETIST, CERTIFIED REGISTERED

## 2021-05-27 PROCEDURE — 25000003 PHARM REV CODE 250: Performed by: UROLOGY

## 2021-05-27 RX ORDER — FENTANYL CITRATE 50 UG/ML
25 INJECTION, SOLUTION INTRAMUSCULAR; INTRAVENOUS EVERY 5 MIN PRN
Status: COMPLETED | OUTPATIENT
Start: 2021-05-27 | End: 2021-05-27

## 2021-05-27 RX ORDER — PHENYLEPHRINE HYDROCHLORIDE 10 MG/ML
INJECTION INTRAVENOUS
Status: DISCONTINUED | OUTPATIENT
Start: 2021-05-27 | End: 2021-05-27

## 2021-05-27 RX ORDER — PROCHLORPERAZINE EDISYLATE 5 MG/ML
5 INJECTION INTRAMUSCULAR; INTRAVENOUS EVERY 30 MIN PRN
Status: DISCONTINUED | OUTPATIENT
Start: 2021-05-27 | End: 2021-05-27 | Stop reason: HOSPADM

## 2021-05-27 RX ORDER — ACETAMINOPHEN 10 MG/ML
INJECTION, SOLUTION INTRAVENOUS
Status: DISCONTINUED | OUTPATIENT
Start: 2021-05-27 | End: 2021-05-27

## 2021-05-27 RX ORDER — MIDAZOLAM HYDROCHLORIDE 1 MG/ML
INJECTION INTRAMUSCULAR; INTRAVENOUS
Status: DISCONTINUED | OUTPATIENT
Start: 2021-05-27 | End: 2021-05-27

## 2021-05-27 RX ORDER — HYDROMORPHONE HYDROCHLORIDE 2 MG/ML
0.2 INJECTION, SOLUTION INTRAMUSCULAR; INTRAVENOUS; SUBCUTANEOUS EVERY 5 MIN PRN
Status: DISCONTINUED | OUTPATIENT
Start: 2021-05-27 | End: 2021-05-27 | Stop reason: HOSPADM

## 2021-05-27 RX ORDER — ONDANSETRON 2 MG/ML
4 INJECTION INTRAMUSCULAR; INTRAVENOUS ONCE AS NEEDED
Status: COMPLETED | OUTPATIENT
Start: 2021-05-27 | End: 2021-05-27

## 2021-05-27 RX ORDER — FENTANYL CITRATE 50 UG/ML
INJECTION, SOLUTION INTRAMUSCULAR; INTRAVENOUS
Status: DISCONTINUED | OUTPATIENT
Start: 2021-05-27 | End: 2021-05-27

## 2021-05-27 RX ORDER — ONDANSETRON 2 MG/ML
INJECTION INTRAMUSCULAR; INTRAVENOUS
Status: DISCONTINUED | OUTPATIENT
Start: 2021-05-27 | End: 2021-05-27

## 2021-05-27 RX ORDER — PROPOFOL 10 MG/ML
VIAL (ML) INTRAVENOUS
Status: DISCONTINUED | OUTPATIENT
Start: 2021-05-27 | End: 2021-05-27

## 2021-05-27 RX ORDER — LIDOCAINE HCL/PF 100 MG/5ML
SYRINGE (ML) INTRAVENOUS
Status: DISCONTINUED | OUTPATIENT
Start: 2021-05-27 | End: 2021-05-27

## 2021-05-27 RX ORDER — LIDOCAINE HYDROCHLORIDE 10 MG/ML
1 INJECTION, SOLUTION EPIDURAL; INFILTRATION; INTRACAUDAL; PERINEURAL ONCE
Status: COMPLETED | OUTPATIENT
Start: 2021-05-27 | End: 2021-05-27

## 2021-05-27 RX ORDER — DEXAMETHASONE SODIUM PHOSPHATE 4 MG/ML
INJECTION, SOLUTION INTRA-ARTICULAR; INTRALESIONAL; INTRAMUSCULAR; INTRAVENOUS; SOFT TISSUE
Status: DISCONTINUED | OUTPATIENT
Start: 2021-05-27 | End: 2021-05-27

## 2021-05-27 RX ORDER — SULFAMETHOXAZOLE AND TRIMETHOPRIM 800; 160 MG/1; MG/1
1 TABLET ORAL 2 TIMES DAILY
Qty: 10 TABLET | Refills: 0 | Status: SHIPPED | OUTPATIENT
Start: 2021-05-27 | End: 2021-06-01

## 2021-05-27 RX ADMIN — PHENYLEPHRINE HYDROCHLORIDE 100 MCG: 10 INJECTION INTRAVENOUS at 12:05

## 2021-05-27 RX ADMIN — PHENYLEPHRINE HYDROCHLORIDE 200 MCG: 10 INJECTION INTRAVENOUS at 12:05

## 2021-05-27 RX ADMIN — FENTANYL CITRATE 25 MCG: 50 INJECTION, SOLUTION INTRAMUSCULAR; INTRAVENOUS at 02:05

## 2021-05-27 RX ADMIN — ACETAMINOPHEN 1000 MG: 10 INJECTION, SOLUTION INTRAVENOUS at 12:05

## 2021-05-27 RX ADMIN — PROPOFOL 170 MG: 10 INJECTION, EMULSION INTRAVENOUS at 12:05

## 2021-05-27 RX ADMIN — FENTANYL CITRATE 25 MCG: 50 INJECTION, SOLUTION INTRAMUSCULAR; INTRAVENOUS at 01:05

## 2021-05-27 RX ADMIN — AMPICILLIN SODIUM 2 G: 2 INJECTION, POWDER, FOR SOLUTION INTRAMUSCULAR; INTRAVENOUS at 12:05

## 2021-05-27 RX ADMIN — DEXAMETHASONE SODIUM PHOSPHATE 4 MG: 4 INJECTION, SOLUTION INTRA-ARTICULAR; INTRALESIONAL; INTRAMUSCULAR; INTRAVENOUS; SOFT TISSUE at 12:05

## 2021-05-27 RX ADMIN — ONDANSETRON 4 MG: 2 INJECTION, SOLUTION INTRAMUSCULAR; INTRAVENOUS at 12:05

## 2021-05-27 RX ADMIN — LIDOCAINE HYDROCHLORIDE 75 MG: 20 INJECTION, SOLUTION INTRAVENOUS at 12:05

## 2021-05-27 RX ADMIN — ONDANSETRON 4 MG: 2 INJECTION INTRAMUSCULAR; INTRAVENOUS at 02:05

## 2021-05-27 RX ADMIN — PHENYLEPHRINE HYDROCHLORIDE 200 MCG: 10 INJECTION INTRAVENOUS at 01:05

## 2021-05-27 RX ADMIN — LIDOCAINE HYDROCHLORIDE 10 MG: 10 INJECTION, SOLUTION EPIDURAL; INFILTRATION; INTRACAUDAL; PERINEURAL at 12:05

## 2021-05-27 RX ADMIN — FENTANYL CITRATE 50 MCG: 50 INJECTION, SOLUTION INTRAMUSCULAR; INTRAVENOUS at 12:05

## 2021-05-27 RX ADMIN — PROCHLORPERAZINE EDISYLATE 5 MG: 5 INJECTION INTRAMUSCULAR; INTRAVENOUS at 02:05

## 2021-05-27 RX ADMIN — MIDAZOLAM HYDROCHLORIDE 2 MG: 1 INJECTION, SOLUTION INTRAMUSCULAR; INTRAVENOUS at 12:05

## 2021-05-27 RX ADMIN — SODIUM CHLORIDE, SODIUM GLUCONATE, SODIUM ACETATE, POTASSIUM CHLORIDE, MAGNESIUM CHLORIDE, SODIUM PHOSPHATE, DIBASIC, AND POTASSIUM PHOSPHATE: .53; .5; .37; .037; .03; .012; .00082 INJECTION, SOLUTION INTRAVENOUS at 12:05

## 2021-05-27 RX ADMIN — GLYCOPYRROLATE 0.2 MG: 0.2 INJECTION, SOLUTION INTRAMUSCULAR; INTRAVITREAL at 12:05

## 2021-05-28 VITALS
SYSTOLIC BLOOD PRESSURE: 119 MMHG | RESPIRATION RATE: 16 BRPM | DIASTOLIC BLOOD PRESSURE: 69 MMHG | HEIGHT: 67 IN | OXYGEN SATURATION: 96 % | HEART RATE: 74 BPM | TEMPERATURE: 98 F | BODY MASS INDEX: 34.84 KG/M2 | WEIGHT: 222 LBS

## 2021-05-31 ENCOUNTER — CLINICAL SUPPORT (OUTPATIENT)
Dept: UROLOGY | Facility: CLINIC | Age: 61
End: 2021-05-31
Payer: MEDICARE

## 2021-05-31 DIAGNOSIS — Z46.6 ENCOUNTER FOR REMOVAL OF URINARY CATHETER: Primary | ICD-10-CM

## 2021-05-31 PROCEDURE — 99499 UNLISTED E&M SERVICE: CPT | Mod: S$GLB,,, | Performed by: UROLOGY

## 2021-05-31 PROCEDURE — 99499 NO LOS: ICD-10-PCS | Mod: S$GLB,,, | Performed by: UROLOGY

## 2021-06-02 ENCOUNTER — TELEPHONE (OUTPATIENT)
Dept: UROLOGY | Facility: CLINIC | Age: 61
End: 2021-06-02

## 2021-06-15 ENCOUNTER — OFFICE VISIT (OUTPATIENT)
Dept: DERMATOLOGY | Facility: CLINIC | Age: 61
End: 2021-06-15
Payer: MEDICARE

## 2021-06-15 DIAGNOSIS — L30.9 HAND DERMATITIS: ICD-10-CM

## 2021-06-15 DIAGNOSIS — L57.0 ACTINIC KERATOSIS: Primary | ICD-10-CM

## 2021-06-15 DIAGNOSIS — H01.139 ECZEMA OF EYELID, UNSPECIFIED LATERALITY: ICD-10-CM

## 2021-06-15 PROCEDURE — 17000 PR DESTRUCTION(LASER SURGERY,CRYOSURGERY,CHEMOSURGERY),PREMALIGNANT LESIONS,FIRST LESION: ICD-10-PCS | Mod: S$GLB,,, | Performed by: STUDENT IN AN ORGANIZED HEALTH CARE EDUCATION/TRAINING PROGRAM

## 2021-06-15 PROCEDURE — 17000 DESTRUCT PREMALG LESION: CPT | Mod: S$GLB,,, | Performed by: STUDENT IN AN ORGANIZED HEALTH CARE EDUCATION/TRAINING PROGRAM

## 2021-06-15 PROCEDURE — 1126F AMNT PAIN NOTED NONE PRSNT: CPT | Mod: S$GLB,,, | Performed by: STUDENT IN AN ORGANIZED HEALTH CARE EDUCATION/TRAINING PROGRAM

## 2021-06-15 PROCEDURE — 99213 OFFICE O/P EST LOW 20 MIN: CPT | Mod: 25,S$GLB,, | Performed by: STUDENT IN AN ORGANIZED HEALTH CARE EDUCATION/TRAINING PROGRAM

## 2021-06-15 PROCEDURE — 99999 PR PBB SHADOW E&M-EST. PATIENT-LVL III: CPT | Mod: PBBFAC,,, | Performed by: STUDENT IN AN ORGANIZED HEALTH CARE EDUCATION/TRAINING PROGRAM

## 2021-06-15 PROCEDURE — 17003 DESTRUCTION, PREMALIGNANT LESIONS; SECOND THROUGH 14 LESIONS: ICD-10-PCS | Mod: S$GLB,,, | Performed by: STUDENT IN AN ORGANIZED HEALTH CARE EDUCATION/TRAINING PROGRAM

## 2021-06-15 PROCEDURE — 99213 PR OFFICE/OUTPT VISIT, EST, LEVL III, 20-29 MIN: ICD-10-PCS | Mod: 25,S$GLB,, | Performed by: STUDENT IN AN ORGANIZED HEALTH CARE EDUCATION/TRAINING PROGRAM

## 2021-06-15 PROCEDURE — 17003 DESTRUCT PREMALG LES 2-14: CPT | Mod: S$GLB,,, | Performed by: STUDENT IN AN ORGANIZED HEALTH CARE EDUCATION/TRAINING PROGRAM

## 2021-06-15 PROCEDURE — 99999 PR PBB SHADOW E&M-EST. PATIENT-LVL III: ICD-10-PCS | Mod: PBBFAC,,, | Performed by: STUDENT IN AN ORGANIZED HEALTH CARE EDUCATION/TRAINING PROGRAM

## 2021-06-15 PROCEDURE — 1126F PR PAIN SEVERITY QUANTIFIED, NO PAIN PRESENT: ICD-10-PCS | Mod: S$GLB,,, | Performed by: STUDENT IN AN ORGANIZED HEALTH CARE EDUCATION/TRAINING PROGRAM

## 2021-06-16 RX ORDER — HYDROCODONE BITARTRATE AND ACETAMINOPHEN 10; 325 MG/1; MG/1
1 TABLET ORAL EVERY 8 HOURS PRN
Qty: 90 TABLET | Refills: 0 | Status: SHIPPED | OUTPATIENT
Start: 2021-08-15 | End: 2021-09-14

## 2021-06-16 RX ORDER — HYDROCODONE BITARTRATE AND ACETAMINOPHEN 10; 325 MG/1; MG/1
1 TABLET ORAL EVERY 8 HOURS PRN
Qty: 90 TABLET | Refills: 0 | Status: SHIPPED | OUTPATIENT
Start: 2021-07-16 | End: 2021-08-15

## 2021-06-16 RX ORDER — HYDROCODONE BITARTRATE AND ACETAMINOPHEN 10; 325 MG/1; MG/1
1 TABLET ORAL EVERY 8 HOURS PRN
Qty: 90 TABLET | Refills: 0 | Status: SHIPPED | OUTPATIENT
Start: 2021-06-16 | End: 2021-07-16

## 2021-06-17 ENCOUNTER — CLINICAL SUPPORT (OUTPATIENT)
Dept: UROLOGY | Facility: CLINIC | Age: 61
End: 2021-06-17
Payer: MEDICARE

## 2021-06-17 DIAGNOSIS — N40.1 BPH WITH OBSTRUCTION/LOWER URINARY TRACT SYMPTOMS: Primary | ICD-10-CM

## 2021-06-17 DIAGNOSIS — N13.8 BPH WITH OBSTRUCTION/LOWER URINARY TRACT SYMPTOMS: Primary | ICD-10-CM

## 2021-06-17 LAB
BACTERIA #/AREA URNS HPF: ABNORMAL /HPF
BILIRUB UR QL STRIP: NEGATIVE
CLARITY UR: ABNORMAL
COLOR UR: YELLOW
GLUCOSE UR QL STRIP: NEGATIVE
HGB UR QL STRIP: ABNORMAL
KETONES UR QL STRIP: NEGATIVE
LEUKOCYTE ESTERASE UR QL STRIP: ABNORMAL
MICROSCOPIC COMMENT: ABNORMAL
NITRITE UR QL STRIP: NEGATIVE
PH UR STRIP: 7 [PH] (ref 5–8)
PROT UR QL STRIP: NEGATIVE
RBC #/AREA URNS HPF: >100 /HPF (ref 0–4)
SP GR UR STRIP: 1.02 (ref 1–1.03)
URN SPEC COLLECT METH UR: ABNORMAL
UROBILINOGEN UR STRIP-ACNC: NEGATIVE EU/DL
WBC #/AREA URNS HPF: 20 /HPF (ref 0–5)

## 2021-06-17 PROCEDURE — 99499 NO LOS: ICD-10-PCS | Mod: S$GLB,,, | Performed by: UROLOGY

## 2021-06-17 PROCEDURE — 87086 URINE CULTURE/COLONY COUNT: CPT | Performed by: UROLOGY

## 2021-06-17 PROCEDURE — 81000 URINALYSIS NONAUTO W/SCOPE: CPT | Performed by: UROLOGY

## 2021-06-17 PROCEDURE — 99499 UNLISTED E&M SERVICE: CPT | Mod: S$GLB,,, | Performed by: UROLOGY

## 2021-06-18 LAB — BACTERIA UR CULT: NO GROWTH

## 2021-06-28 ENCOUNTER — TELEPHONE (OUTPATIENT)
Dept: UROLOGY | Facility: CLINIC | Age: 61
End: 2021-06-28

## 2021-07-12 ENCOUNTER — OFFICE VISIT (OUTPATIENT)
Dept: UROLOGY | Facility: CLINIC | Age: 61
End: 2021-07-12
Payer: MEDICARE

## 2021-07-12 VITALS
BODY MASS INDEX: 34.26 KG/M2 | DIASTOLIC BLOOD PRESSURE: 86 MMHG | HEART RATE: 105 BPM | WEIGHT: 218.25 LBS | SYSTOLIC BLOOD PRESSURE: 134 MMHG | HEIGHT: 67 IN

## 2021-07-12 DIAGNOSIS — N39.41 URGE INCONTINENCE: ICD-10-CM

## 2021-07-12 DIAGNOSIS — N40.0 BPH WITHOUT OBSTRUCTION/LOWER URINARY TRACT SYMPTOMS: Primary | ICD-10-CM

## 2021-07-12 LAB
BILIRUB SERPL-MCNC: ABNORMAL MG/DL
BLOOD URINE, POC: ABNORMAL
CLARITY, POC UA: CLEAR
COLOR, POC UA: YELLOW
GLUCOSE UR QL STRIP: ABNORMAL
KETONES UR QL STRIP: ABNORMAL
LEUKOCYTE ESTERASE URINE, POC: ABNORMAL
NITRITE, POC UA: ABNORMAL
PH, POC UA: 5
POC RESIDUAL URINE VOLUME: 16 ML (ref 0–100)
PROTEIN, POC: 100
SPECIFIC GRAVITY, POC UA: 1.02
UROBILINOGEN, POC UA: 0.2

## 2021-07-12 PROCEDURE — 99024 PR POST-OP FOLLOW-UP VISIT: ICD-10-PCS | Mod: S$GLB,,, | Performed by: UROLOGY

## 2021-07-12 PROCEDURE — 99999 PR PBB SHADOW E&M-EST. PATIENT-LVL IV: ICD-10-PCS | Mod: PBBFAC,,, | Performed by: UROLOGY

## 2021-07-12 PROCEDURE — 51798 US URINE CAPACITY MEASURE: CPT | Mod: S$GLB,,, | Performed by: UROLOGY

## 2021-07-12 PROCEDURE — 3008F PR BODY MASS INDEX (BMI) DOCUMENTED: ICD-10-PCS | Mod: CPTII,S$GLB,, | Performed by: UROLOGY

## 2021-07-12 PROCEDURE — 51798 POCT BLADDER SCAN: ICD-10-PCS | Mod: S$GLB,,, | Performed by: UROLOGY

## 2021-07-12 PROCEDURE — 99999 PR PBB SHADOW E&M-EST. PATIENT-LVL IV: CPT | Mod: PBBFAC,,, | Performed by: UROLOGY

## 2021-07-12 PROCEDURE — 1126F AMNT PAIN NOTED NONE PRSNT: CPT | Mod: S$GLB,,, | Performed by: UROLOGY

## 2021-07-12 PROCEDURE — 81002 POCT URINE DIPSTICK WITHOUT MICROSCOPE: ICD-10-PCS | Mod: S$GLB,,, | Performed by: UROLOGY

## 2021-07-12 PROCEDURE — 3008F BODY MASS INDEX DOCD: CPT | Mod: CPTII,S$GLB,, | Performed by: UROLOGY

## 2021-07-12 PROCEDURE — 99024 POSTOP FOLLOW-UP VISIT: CPT | Mod: S$GLB,,, | Performed by: UROLOGY

## 2021-07-12 PROCEDURE — 87086 URINE CULTURE/COLONY COUNT: CPT | Performed by: UROLOGY

## 2021-07-12 PROCEDURE — 81002 URINALYSIS NONAUTO W/O SCOPE: CPT | Mod: S$GLB,,, | Performed by: UROLOGY

## 2021-07-12 PROCEDURE — 1126F PR PAIN SEVERITY QUANTIFIED, NO PAIN PRESENT: ICD-10-PCS | Mod: S$GLB,,, | Performed by: UROLOGY

## 2021-07-12 RX ORDER — OXYBUTYNIN CHLORIDE 10 MG/1
10 TABLET, EXTENDED RELEASE ORAL DAILY
Qty: 30 TABLET | Refills: 11 | Status: SHIPPED | OUTPATIENT
Start: 2021-07-12 | End: 2023-11-17

## 2021-07-13 LAB — BACTERIA UR CULT: NO GROWTH

## 2021-09-17 RX ORDER — HYDROCODONE BITARTRATE AND ACETAMINOPHEN 10; 325 MG/1; MG/1
1 TABLET ORAL EVERY 8 HOURS PRN
Qty: 90 TABLET | Refills: 0 | Status: SHIPPED | OUTPATIENT
Start: 2021-09-17 | End: 2021-10-15 | Stop reason: SDUPTHER

## 2021-10-12 ENCOUNTER — OFFICE VISIT (OUTPATIENT)
Dept: UROLOGY | Facility: CLINIC | Age: 61
End: 2021-10-12
Payer: MEDICARE

## 2021-10-12 VITALS
SYSTOLIC BLOOD PRESSURE: 144 MMHG | HEIGHT: 67 IN | DIASTOLIC BLOOD PRESSURE: 93 MMHG | BODY MASS INDEX: 34.95 KG/M2 | HEART RATE: 96 BPM | WEIGHT: 222.69 LBS

## 2021-10-12 DIAGNOSIS — N40.0 BPH WITHOUT OBSTRUCTION/LOWER URINARY TRACT SYMPTOMS: Primary | ICD-10-CM

## 2021-10-12 DIAGNOSIS — Z12.5 PROSTATE CANCER SCREENING: ICD-10-CM

## 2021-10-12 LAB
BILIRUB SERPL-MCNC: NORMAL MG/DL
BLOOD URINE, POC: NORMAL
CLARITY, POC UA: CLEAR
COLOR, POC UA: YELLOW
GLUCOSE UR QL STRIP: NORMAL
KETONES UR QL STRIP: NORMAL
LEUKOCYTE ESTERASE URINE, POC: NORMAL
NITRITE, POC UA: NORMAL
PH, POC UA: 7
POC RESIDUAL URINE VOLUME: 0 ML (ref 0–100)
PROTEIN, POC: NORMAL
SPECIFIC GRAVITY, POC UA: 1.02
UROBILINOGEN, POC UA: 0.2

## 2021-10-12 PROCEDURE — 99214 PR OFFICE/OUTPT VISIT, EST, LEVL IV, 30-39 MIN: ICD-10-PCS | Mod: S$GLB,,, | Performed by: UROLOGY

## 2021-10-12 PROCEDURE — 3061F PR NEG MICROALBUMINURIA RESULT DOCUMENTED/REVIEW: ICD-10-PCS | Mod: CPTII,S$GLB,, | Performed by: UROLOGY

## 2021-10-12 PROCEDURE — 3066F NEPHROPATHY DOC TX: CPT | Mod: CPTII,S$GLB,, | Performed by: UROLOGY

## 2021-10-12 PROCEDURE — 3080F DIAST BP >= 90 MM HG: CPT | Mod: CPTII,S$GLB,, | Performed by: UROLOGY

## 2021-10-12 PROCEDURE — 1159F MED LIST DOCD IN RCRD: CPT | Mod: CPTII,S$GLB,, | Performed by: UROLOGY

## 2021-10-12 PROCEDURE — 3008F BODY MASS INDEX DOCD: CPT | Mod: CPTII,S$GLB,, | Performed by: UROLOGY

## 2021-10-12 PROCEDURE — 51798 US URINE CAPACITY MEASURE: CPT | Mod: S$GLB,,, | Performed by: UROLOGY

## 2021-10-12 PROCEDURE — 3061F NEG MICROALBUMINURIA REV: CPT | Mod: CPTII,S$GLB,, | Performed by: UROLOGY

## 2021-10-12 PROCEDURE — 3080F PR MOST RECENT DIASTOLIC BLOOD PRESSURE >= 90 MM HG: ICD-10-PCS | Mod: CPTII,S$GLB,, | Performed by: UROLOGY

## 2021-10-12 PROCEDURE — 3077F PR MOST RECENT SYSTOLIC BLOOD PRESSURE >= 140 MM HG: ICD-10-PCS | Mod: CPTII,S$GLB,, | Performed by: UROLOGY

## 2021-10-12 PROCEDURE — 81002 POCT URINE DIPSTICK WITHOUT MICROSCOPE: ICD-10-PCS | Mod: S$GLB,,, | Performed by: UROLOGY

## 2021-10-12 PROCEDURE — 1160F RVW MEDS BY RX/DR IN RCRD: CPT | Mod: CPTII,S$GLB,, | Performed by: UROLOGY

## 2021-10-12 PROCEDURE — 3008F PR BODY MASS INDEX (BMI) DOCUMENTED: ICD-10-PCS | Mod: CPTII,S$GLB,, | Performed by: UROLOGY

## 2021-10-12 PROCEDURE — 3077F SYST BP >= 140 MM HG: CPT | Mod: CPTII,S$GLB,, | Performed by: UROLOGY

## 2021-10-12 PROCEDURE — 99999 PR PBB SHADOW E&M-EST. PATIENT-LVL IV: ICD-10-PCS | Mod: PBBFAC,,, | Performed by: UROLOGY

## 2021-10-12 PROCEDURE — 81002 URINALYSIS NONAUTO W/O SCOPE: CPT | Mod: S$GLB,,, | Performed by: UROLOGY

## 2021-10-12 PROCEDURE — 99999 PR PBB SHADOW E&M-EST. PATIENT-LVL IV: CPT | Mod: PBBFAC,,, | Performed by: UROLOGY

## 2021-10-12 PROCEDURE — 1159F PR MEDICATION LIST DOCUMENTED IN MEDICAL RECORD: ICD-10-PCS | Mod: CPTII,S$GLB,, | Performed by: UROLOGY

## 2021-10-12 PROCEDURE — 51798 POCT BLADDER SCAN: ICD-10-PCS | Mod: S$GLB,,, | Performed by: UROLOGY

## 2021-10-12 PROCEDURE — 99214 OFFICE O/P EST MOD 30 MIN: CPT | Mod: S$GLB,,, | Performed by: UROLOGY

## 2021-10-12 PROCEDURE — 1160F PR REVIEW ALL MEDS BY PRESCRIBER/CLIN PHARMACIST DOCUMENTED: ICD-10-PCS | Mod: CPTII,S$GLB,, | Performed by: UROLOGY

## 2021-10-12 PROCEDURE — 3066F PR DOCUMENTATION OF TREATMENT FOR NEPHROPATHY: ICD-10-PCS | Mod: CPTII,S$GLB,, | Performed by: UROLOGY

## 2021-10-15 RX ORDER — HYDROCODONE BITARTRATE AND ACETAMINOPHEN 10; 325 MG/1; MG/1
1 TABLET ORAL EVERY 8 HOURS PRN
Qty: 90 TABLET | Refills: 0 | Status: SHIPPED | OUTPATIENT
Start: 2021-10-16 | End: 2021-11-15 | Stop reason: SDUPTHER

## 2021-10-27 ENCOUNTER — TELEPHONE (OUTPATIENT)
Dept: FAMILY MEDICINE | Facility: CLINIC | Age: 61
End: 2021-10-27
Payer: MEDICARE

## 2021-10-27 DIAGNOSIS — Z79.899 ENCOUNTER FOR LONG-TERM (CURRENT) USE OF OTHER MEDICATIONS: Primary | ICD-10-CM

## 2021-11-15 ENCOUNTER — OFFICE VISIT (OUTPATIENT)
Dept: FAMILY MEDICINE | Facility: CLINIC | Age: 61
End: 2021-11-15
Payer: MEDICARE

## 2021-11-15 VITALS
HEART RATE: 80 BPM | WEIGHT: 225 LBS | DIASTOLIC BLOOD PRESSURE: 82 MMHG | HEIGHT: 67 IN | SYSTOLIC BLOOD PRESSURE: 136 MMHG | BODY MASS INDEX: 35.31 KG/M2

## 2021-11-15 DIAGNOSIS — J30.89 NON-SEASONAL ALLERGIC RHINITIS, UNSPECIFIED TRIGGER: ICD-10-CM

## 2021-11-15 DIAGNOSIS — N52.9 ERECTILE DYSFUNCTION, UNSPECIFIED ERECTILE DYSFUNCTION TYPE: ICD-10-CM

## 2021-11-15 DIAGNOSIS — Z51.81 ENCOUNTER FOR THERAPEUTIC DRUG MONITORING: ICD-10-CM

## 2021-11-15 DIAGNOSIS — I10 ESSENTIAL HYPERTENSION: Primary | ICD-10-CM

## 2021-11-15 DIAGNOSIS — F41.9 ANXIETY DISORDER, UNSPECIFIED TYPE: ICD-10-CM

## 2021-11-15 DIAGNOSIS — Z01.89 NEED FOR ASSESSMENT FOR SLEEP APNEA: ICD-10-CM

## 2021-11-15 DIAGNOSIS — G89.4 CHRONIC PAIN SYNDROME: ICD-10-CM

## 2021-11-15 DIAGNOSIS — K21.9 GASTROESOPHAGEAL REFLUX DISEASE, UNSPECIFIED WHETHER ESOPHAGITIS PRESENT: ICD-10-CM

## 2021-11-15 DIAGNOSIS — N40.1 BPH WITH OBSTRUCTION/LOWER URINARY TRACT SYMPTOMS: ICD-10-CM

## 2021-11-15 DIAGNOSIS — E78.5 HYPERLIPIDEMIA, UNSPECIFIED HYPERLIPIDEMIA TYPE: ICD-10-CM

## 2021-11-15 DIAGNOSIS — Z23 FLU VACCINE NEED: ICD-10-CM

## 2021-11-15 DIAGNOSIS — N13.8 BPH WITH OBSTRUCTION/LOWER URINARY TRACT SYMPTOMS: ICD-10-CM

## 2021-11-15 DIAGNOSIS — Z12.5 SCREENING PSA (PROSTATE SPECIFIC ANTIGEN): ICD-10-CM

## 2021-11-15 PROCEDURE — 3061F NEG MICROALBUMINURIA REV: CPT | Mod: S$GLB,,, | Performed by: PHYSICIAN ASSISTANT

## 2021-11-15 PROCEDURE — 3066F NEPHROPATHY DOC TX: CPT | Mod: S$GLB,,, | Performed by: PHYSICIAN ASSISTANT

## 2021-11-15 PROCEDURE — 3061F PR NEG MICROALBUMINURIA RESULT DOCUMENTED/REVIEW: ICD-10-PCS | Mod: S$GLB,,, | Performed by: PHYSICIAN ASSISTANT

## 2021-11-15 PROCEDURE — 3075F SYST BP GE 130 - 139MM HG: CPT | Mod: S$GLB,,, | Performed by: PHYSICIAN ASSISTANT

## 2021-11-15 PROCEDURE — 3079F PR MOST RECENT DIASTOLIC BLOOD PRESSURE 80-89 MM HG: ICD-10-PCS | Mod: S$GLB,,, | Performed by: PHYSICIAN ASSISTANT

## 2021-11-15 PROCEDURE — 1160F PR REVIEW ALL MEDS BY PRESCRIBER/CLIN PHARMACIST DOCUMENTED: ICD-10-PCS | Mod: S$GLB,,, | Performed by: PHYSICIAN ASSISTANT

## 2021-11-15 PROCEDURE — 90682 RIV4 VACC RECOMBINANT DNA IM: CPT | Mod: S$GLB,,, | Performed by: PHYSICIAN ASSISTANT

## 2021-11-15 PROCEDURE — 99214 PR OFFICE/OUTPT VISIT, EST, LEVL IV, 30-39 MIN: ICD-10-PCS | Mod: 25,S$GLB,, | Performed by: PHYSICIAN ASSISTANT

## 2021-11-15 PROCEDURE — 90682 FLU VACCINE - QUADRIVALENT (RECOMBINANT) PRESERVATIVE FREE: ICD-10-PCS | Mod: S$GLB,,, | Performed by: PHYSICIAN ASSISTANT

## 2021-11-15 PROCEDURE — 3066F PR DOCUMENTATION OF TREATMENT FOR NEPHROPATHY: ICD-10-PCS | Mod: S$GLB,,, | Performed by: PHYSICIAN ASSISTANT

## 2021-11-15 PROCEDURE — G0008 ADMIN INFLUENZA VIRUS VAC: HCPCS | Mod: S$GLB,,, | Performed by: PHYSICIAN ASSISTANT

## 2021-11-15 PROCEDURE — 3008F BODY MASS INDEX DOCD: CPT | Mod: S$GLB,,, | Performed by: PHYSICIAN ASSISTANT

## 2021-11-15 PROCEDURE — 3075F PR MOST RECENT SYSTOLIC BLOOD PRESS GE 130-139MM HG: ICD-10-PCS | Mod: S$GLB,,, | Performed by: PHYSICIAN ASSISTANT

## 2021-11-15 PROCEDURE — 1160F RVW MEDS BY RX/DR IN RCRD: CPT | Mod: S$GLB,,, | Performed by: PHYSICIAN ASSISTANT

## 2021-11-15 PROCEDURE — G0008 FLU VACCINE - QUADRIVALENT (RECOMBINANT) PRESERVATIVE FREE: ICD-10-PCS | Mod: S$GLB,,, | Performed by: PHYSICIAN ASSISTANT

## 2021-11-15 PROCEDURE — 3079F DIAST BP 80-89 MM HG: CPT | Mod: S$GLB,,, | Performed by: PHYSICIAN ASSISTANT

## 2021-11-15 PROCEDURE — 3008F PR BODY MASS INDEX (BMI) DOCUMENTED: ICD-10-PCS | Mod: S$GLB,,, | Performed by: PHYSICIAN ASSISTANT

## 2021-11-15 PROCEDURE — 99214 OFFICE O/P EST MOD 30 MIN: CPT | Mod: 25,S$GLB,, | Performed by: PHYSICIAN ASSISTANT

## 2021-11-15 RX ORDER — ALPRAZOLAM 1 MG/1
1 TABLET ORAL DAILY
Qty: 60 TABLET | Refills: 2 | Status: SHIPPED | OUTPATIENT
Start: 2021-11-15 | End: 2022-05-16 | Stop reason: SDUPTHER

## 2021-11-15 RX ORDER — SILDENAFIL 100 MG/1
100 TABLET, FILM COATED ORAL
Qty: 6 TABLET | Refills: 6 | Status: SHIPPED | OUTPATIENT
Start: 2021-11-15 | End: 2022-11-29 | Stop reason: SDUPTHER

## 2021-11-15 RX ORDER — DULOXETIN HYDROCHLORIDE 60 MG/1
60 CAPSULE, DELAYED RELEASE ORAL DAILY
Qty: 90 CAPSULE | Refills: 1 | Status: SHIPPED | OUTPATIENT
Start: 2021-11-15 | End: 2022-06-07 | Stop reason: SDUPTHER

## 2021-11-15 RX ORDER — PANTOPRAZOLE SODIUM 40 MG/1
40 TABLET, DELAYED RELEASE ORAL DAILY
Qty: 90 TABLET | Refills: 1 | Status: SHIPPED | OUTPATIENT
Start: 2021-11-15 | End: 2022-04-06 | Stop reason: SDUPTHER

## 2021-11-15 RX ORDER — CYCLOBENZAPRINE HCL 5 MG
5 TABLET ORAL 3 TIMES DAILY PRN
Qty: 90 TABLET | Refills: 2 | Status: SHIPPED | OUTPATIENT
Start: 2021-11-15 | End: 2022-04-06 | Stop reason: SDUPTHER

## 2021-11-15 RX ORDER — HYDROCHLOROTHIAZIDE 25 MG/1
25 TABLET ORAL DAILY
Qty: 90 TABLET | Refills: 1 | Status: SHIPPED | OUTPATIENT
Start: 2021-11-15 | End: 2022-03-15 | Stop reason: SDUPTHER

## 2021-11-15 RX ORDER — ATORVASTATIN CALCIUM 40 MG/1
40 TABLET, FILM COATED ORAL DAILY
Qty: 90 TABLET | Refills: 1 | Status: SHIPPED | OUTPATIENT
Start: 2021-11-15 | End: 2022-05-16 | Stop reason: SDUPTHER

## 2021-11-15 RX ORDER — HYDROCODONE BITARTRATE AND ACETAMINOPHEN 10; 325 MG/1; MG/1
1 TABLET ORAL EVERY 8 HOURS PRN
Qty: 90 TABLET | Refills: 0 | Status: SHIPPED | OUTPATIENT
Start: 2021-11-15 | End: 2021-12-15

## 2021-11-15 RX ORDER — AMLODIPINE BESYLATE 10 MG/1
10 TABLET ORAL DAILY
Qty: 90 TABLET | Refills: 1 | Status: SHIPPED | OUTPATIENT
Start: 2021-11-15 | End: 2022-05-16 | Stop reason: SDUPTHER

## 2021-11-15 RX ORDER — FLUTICASONE PROPIONATE 50 MCG
1 SPRAY, SUSPENSION (ML) NASAL
Qty: 16 G | Refills: 3 | Status: SHIPPED | OUTPATIENT
Start: 2021-11-15 | End: 2023-11-13 | Stop reason: SDUPTHER

## 2021-11-15 RX ORDER — GABAPENTIN 300 MG/1
900 CAPSULE ORAL 3 TIMES DAILY
Qty: 810 CAPSULE | Refills: 1 | Status: SHIPPED | OUTPATIENT
Start: 2021-11-15 | End: 2022-04-06 | Stop reason: SDUPTHER

## 2021-11-16 LAB
ALBUMIN SERPL-MCNC: 4.4 G/DL (ref 3.6–5.1)
ALBUMIN/GLOB SERPL: 1.6 (CALC) (ref 1–2.5)
ALP SERPL-CCNC: 143 U/L (ref 35–144)
ALT SERPL-CCNC: 40 U/L (ref 9–46)
AST SERPL-CCNC: 17 U/L (ref 10–35)
BILIRUB SERPL-MCNC: 0.6 MG/DL (ref 0.2–1.2)
BUN SERPL-MCNC: 15 MG/DL (ref 7–25)
BUN/CREAT SERPL: NORMAL (CALC) (ref 6–22)
CALCIUM SERPL-MCNC: 9.8 MG/DL (ref 8.6–10.3)
CHLORIDE SERPL-SCNC: 99 MMOL/L (ref 98–110)
CHOLEST SERPL-MCNC: 198 MG/DL
CHOLEST/HDLC SERPL: 4 (CALC)
CO2 SERPL-SCNC: 28 MMOL/L (ref 20–32)
CREAT SERPL-MCNC: 0.8 MG/DL (ref 0.7–1.33)
GLOBULIN SER CALC-MCNC: 2.7 G/DL (CALC) (ref 1.9–3.7)
GLUCOSE SERPL-MCNC: 96 MG/DL (ref 65–99)
HDLC SERPL-MCNC: 49 MG/DL
LDLC SERPL CALC-MCNC: 129 MG/DL (CALC)
NONHDLC SERPL-MCNC: 149 MG/DL (CALC)
POTASSIUM SERPL-SCNC: 4 MMOL/L (ref 3.5–5.3)
PROT SERPL-MCNC: 7.1 G/DL (ref 6.1–8.1)
SODIUM SERPL-SCNC: 138 MMOL/L (ref 135–146)
TRIGL SERPL-MCNC: 94 MG/DL

## 2021-11-18 LAB — PSA SERPL-MCNC: 2.85 NG/ML

## 2021-12-15 ENCOUNTER — TELEPHONE (OUTPATIENT)
Dept: FAMILY MEDICINE | Facility: CLINIC | Age: 61
End: 2021-12-15
Payer: MEDICARE

## 2021-12-16 ENCOUNTER — OFFICE VISIT (OUTPATIENT)
Dept: FAMILY MEDICINE | Facility: CLINIC | Age: 61
End: 2021-12-16
Payer: MEDICARE

## 2021-12-16 VITALS
DIASTOLIC BLOOD PRESSURE: 86 MMHG | BODY MASS INDEX: 34.37 KG/M2 | SYSTOLIC BLOOD PRESSURE: 132 MMHG | HEIGHT: 67 IN | WEIGHT: 219 LBS | HEART RATE: 80 BPM | TEMPERATURE: 98 F

## 2021-12-16 DIAGNOSIS — A09 GASTROENTERITIS, INFECTIOUS: Primary | ICD-10-CM

## 2021-12-16 PROCEDURE — 3066F PR DOCUMENTATION OF TREATMENT FOR NEPHROPATHY: ICD-10-PCS | Mod: S$GLB,,, | Performed by: PHYSICIAN ASSISTANT

## 2021-12-16 PROCEDURE — 3066F NEPHROPATHY DOC TX: CPT | Mod: S$GLB,,, | Performed by: PHYSICIAN ASSISTANT

## 2021-12-16 PROCEDURE — 3061F PR NEG MICROALBUMINURIA RESULT DOCUMENTED/REVIEW: ICD-10-PCS | Mod: S$GLB,,, | Performed by: PHYSICIAN ASSISTANT

## 2021-12-16 PROCEDURE — 99213 PR OFFICE/OUTPT VISIT, EST, LEVL III, 20-29 MIN: ICD-10-PCS | Mod: S$GLB,,, | Performed by: PHYSICIAN ASSISTANT

## 2021-12-16 PROCEDURE — 99213 OFFICE O/P EST LOW 20 MIN: CPT | Mod: S$GLB,,, | Performed by: PHYSICIAN ASSISTANT

## 2021-12-16 PROCEDURE — 3061F NEG MICROALBUMINURIA REV: CPT | Mod: S$GLB,,, | Performed by: PHYSICIAN ASSISTANT

## 2021-12-20 DIAGNOSIS — G89.4 CHRONIC PAIN SYNDROME: Primary | ICD-10-CM

## 2021-12-20 RX ORDER — HYDROCODONE BITARTRATE AND ACETAMINOPHEN 10; 325 MG/1; MG/1
1 TABLET ORAL EVERY 6 HOURS PRN
Qty: 90 TABLET | Refills: 0 | Status: SHIPPED | OUTPATIENT
Start: 2022-01-19 | End: 2022-04-18 | Stop reason: SDUPTHER

## 2021-12-20 RX ORDER — HYDROCODONE BITARTRATE AND ACETAMINOPHEN 10; 325 MG/1; MG/1
1 TABLET ORAL EVERY 6 HOURS PRN
Qty: 90 TABLET | Refills: 0 | Status: SHIPPED | OUTPATIENT
Start: 2022-02-18 | End: 2023-11-13 | Stop reason: SDUPTHER

## 2021-12-20 RX ORDER — HYDROCODONE BITARTRATE AND ACETAMINOPHEN 10; 325 MG/1; MG/1
1 TABLET ORAL EVERY 6 HOURS PRN
Qty: 90 TABLET | Refills: 0 | Status: SHIPPED | OUTPATIENT
Start: 2021-12-20 | End: 2022-01-18 | Stop reason: SDUPTHER

## 2022-01-18 DIAGNOSIS — G89.4 CHRONIC PAIN SYNDROME: ICD-10-CM

## 2022-01-18 RX ORDER — HYDROCODONE BITARTRATE AND ACETAMINOPHEN 10; 325 MG/1; MG/1
1 TABLET ORAL EVERY 6 HOURS PRN
Qty: 90 TABLET | Refills: 0 | Status: SHIPPED | OUTPATIENT
Start: 2022-01-20 | End: 2022-03-15 | Stop reason: SDUPTHER

## 2022-01-18 NOTE — TELEPHONE ENCOUNTER
The patient's prescription has been approved and sent to   [id number] - [place], [place] - [address] [address]  Phone: [phone] Fax: [phone]

## 2022-02-08 ENCOUNTER — TELEPHONE (OUTPATIENT)
Dept: FAMILY MEDICINE | Facility: CLINIC | Age: 62
End: 2022-02-08
Payer: MEDICARE

## 2022-02-08 NOTE — TELEPHONE ENCOUNTER
"----- Message from Mary Anne Glez sent at 2/8/2022 11:17 AM CST -----  Pt calling said he has "yearly annual" allergies, cough, congestion, clear with greenis mucous and wants to get something called in he stated he would rather not come in unless he has to.   [facility]  [phone]    "

## 2022-02-09 ENCOUNTER — OFFICE VISIT (OUTPATIENT)
Dept: FAMILY MEDICINE | Facility: CLINIC | Age: 62
End: 2022-02-09
Payer: MEDICARE

## 2022-02-09 VITALS
HEIGHT: 67 IN | SYSTOLIC BLOOD PRESSURE: 124 MMHG | TEMPERATURE: 99 F | OXYGEN SATURATION: 95 % | DIASTOLIC BLOOD PRESSURE: 80 MMHG | BODY MASS INDEX: 35.16 KG/M2 | HEART RATE: 112 BPM | WEIGHT: 224 LBS

## 2022-02-09 DIAGNOSIS — K21.9 GASTROESOPHAGEAL REFLUX DISEASE, UNSPECIFIED WHETHER ESOPHAGITIS PRESENT: ICD-10-CM

## 2022-02-09 DIAGNOSIS — R05.9 COUGH IN ADULT PATIENT: ICD-10-CM

## 2022-02-09 DIAGNOSIS — I10 ESSENTIAL HYPERTENSION: ICD-10-CM

## 2022-02-09 DIAGNOSIS — N40.1 BPH WITH OBSTRUCTION/LOWER URINARY TRACT SYMPTOMS: ICD-10-CM

## 2022-02-09 DIAGNOSIS — J32.4 PANSINUSITIS, UNSPECIFIED CHRONICITY: Primary | ICD-10-CM

## 2022-02-09 DIAGNOSIS — N13.8 BPH WITH OBSTRUCTION/LOWER URINARY TRACT SYMPTOMS: ICD-10-CM

## 2022-02-09 DIAGNOSIS — E78.5 HYPERLIPIDEMIA, UNSPECIFIED HYPERLIPIDEMIA TYPE: ICD-10-CM

## 2022-02-09 DIAGNOSIS — F41.9 ANXIETY DISORDER, UNSPECIFIED TYPE: ICD-10-CM

## 2022-02-09 DIAGNOSIS — R00.0 TACHYCARDIA: ICD-10-CM

## 2022-02-09 LAB
CTP QC/QA: YES
SARS-COV-2 RDRP RESP QL NAA+PROBE: NEGATIVE

## 2022-02-09 PROCEDURE — 1160F PR REVIEW ALL MEDS BY PRESCRIBER/CLIN PHARMACIST DOCUMENTED: ICD-10-PCS | Mod: S$GLB,,, | Performed by: PHYSICIAN ASSISTANT

## 2022-02-09 PROCEDURE — U0002 COVID-19 LAB TEST NON-CDC: HCPCS | Mod: QW,S$GLB,, | Performed by: PHYSICIAN ASSISTANT

## 2022-02-09 PROCEDURE — 99214 PR OFFICE/OUTPT VISIT, EST, LEVL IV, 30-39 MIN: ICD-10-PCS | Mod: S$GLB,,, | Performed by: PHYSICIAN ASSISTANT

## 2022-02-09 PROCEDURE — 3079F PR MOST RECENT DIASTOLIC BLOOD PRESSURE 80-89 MM HG: ICD-10-PCS | Mod: S$GLB,,, | Performed by: PHYSICIAN ASSISTANT

## 2022-02-09 PROCEDURE — 3008F BODY MASS INDEX DOCD: CPT | Mod: S$GLB,,, | Performed by: PHYSICIAN ASSISTANT

## 2022-02-09 PROCEDURE — 3008F PR BODY MASS INDEX (BMI) DOCUMENTED: ICD-10-PCS | Mod: S$GLB,,, | Performed by: PHYSICIAN ASSISTANT

## 2022-02-09 PROCEDURE — 1160F RVW MEDS BY RX/DR IN RCRD: CPT | Mod: S$GLB,,, | Performed by: PHYSICIAN ASSISTANT

## 2022-02-09 PROCEDURE — U0002: ICD-10-PCS | Mod: QW,S$GLB,, | Performed by: PHYSICIAN ASSISTANT

## 2022-02-09 PROCEDURE — 3074F PR MOST RECENT SYSTOLIC BLOOD PRESSURE < 130 MM HG: ICD-10-PCS | Mod: S$GLB,,, | Performed by: PHYSICIAN ASSISTANT

## 2022-02-09 PROCEDURE — 3074F SYST BP LT 130 MM HG: CPT | Mod: S$GLB,,, | Performed by: PHYSICIAN ASSISTANT

## 2022-02-09 PROCEDURE — 3079F DIAST BP 80-89 MM HG: CPT | Mod: S$GLB,,, | Performed by: PHYSICIAN ASSISTANT

## 2022-02-09 PROCEDURE — 99214 OFFICE O/P EST MOD 30 MIN: CPT | Mod: S$GLB,,, | Performed by: PHYSICIAN ASSISTANT

## 2022-02-09 RX ORDER — PROMETHAZINE HYDROCHLORIDE AND DEXTROMETHORPHAN HYDROBROMIDE 6.25; 15 MG/5ML; MG/5ML
5 SYRUP ORAL NIGHTLY PRN
Qty: 180 ML | Refills: 0 | Status: SHIPPED | OUTPATIENT
Start: 2022-02-09 | End: 2022-02-19

## 2022-02-09 RX ORDER — BENZONATATE 200 MG/1
200 CAPSULE ORAL 3 TIMES DAILY PRN
Qty: 30 CAPSULE | Refills: 0 | Status: SHIPPED | OUTPATIENT
Start: 2022-02-09 | End: 2022-02-19

## 2022-02-09 RX ORDER — CETIRIZINE HYDROCHLORIDE 10 MG/1
10 TABLET ORAL DAILY
Qty: 90 TABLET | Refills: 1 | Status: SHIPPED | OUTPATIENT
Start: 2022-02-09 | End: 2024-02-23

## 2022-02-09 RX ORDER — AMOXICILLIN AND CLAVULANATE POTASSIUM 875; 125 MG/1; MG/1
1 TABLET, FILM COATED ORAL 2 TIMES DAILY
Qty: 14 TABLET | Refills: 0 | Status: SHIPPED | OUTPATIENT
Start: 2022-02-09 | End: 2022-02-16

## 2022-02-09 NOTE — PROGRESS NOTES
"  SUBJECTIVE:    Patient ID: Patrick Wilson is a 61 y.o. male.    Chief Complaint: Cough (No bottles/ TA), Fatigue, Dizziness, Sore Throat, and Nasal Congestion    Pt is a 61 y.o. male who presents today for a sick visit with a CC of "sinus congestion and a cough." He reports being around his son who is experiencing similar symptoms, but never got tested for COVID-19.  The symptoms started gradually, 1 week ago.  He is experiencing ethmoid sinus congestion and a cough that is productive of green/yellow colored phlegm.  The symptoms are constantly present and the cough is currently preventing him from sleeping at night.  The symptoms tend to be exacerbated with weather changes.  He has been taking OTC Mucinex liquid q.h.s., but this does not provide any relief. He denies any fevers, diarrhea, myalgia, SOB, CP, or HAs.    He received 3 doses of the Moderna COVID-19 vaccine and the influenza vaccine at this time.     Office Visit on 02/09/2022   Component Date Value Ref Range Status    POC Rapid COVID 02/09/2022 Negative  Negative Final     Acceptable 02/09/2022 Yes   Final   Orders Only on 11/15/2021   Component Date Value Ref Range Status    PROSTATE SPECIFIC ANTIGEN, SCR - Q* 11/15/2021 2.85  < OR = 4.00 ng/mL Final   Telephone on 10/27/2021   Component Date Value Ref Range Status    Glucose 11/15/2021 96  65 - 99 mg/dL Final    BUN 11/15/2021 15  7 - 25 mg/dL Final    Creatinine 11/15/2021 0.80  0.70 - 1.33 mg/dL Final    eGFR if non  11/15/2021 98  > OR = 60 mL/min/1.73m2 Final    eGFR if  11/15/2021 113  > OR = 60 mL/min/1.73m2 Final    BUN/Creatinine Ratio 11/15/2021 NOT APPLICABLE  6 - 22 (calc) Final    Sodium 11/15/2021 138  135 - 146 mmol/L Final    Potassium 11/15/2021 4.0  3.5 - 5.3 mmol/L Final    Chloride 11/15/2021 99  98 - 110 mmol/L Final    CO2 11/15/2021 28  20 - 32 mmol/L Final    Calcium 11/15/2021 9.8  8.6 - 10.3 mg/dL Final "    Total Protein 11/15/2021 7.1  6.1 - 8.1 g/dL Final    Albumin 11/15/2021 4.4  3.6 - 5.1 g/dL Final    Globulin, Total 11/15/2021 2.7  1.9 - 3.7 g/dL (calc) Final    Albumin/Globulin Ratio 11/15/2021 1.6  1.0 - 2.5 (calc) Final    Total Bilirubin 11/15/2021 0.6  0.2 - 1.2 mg/dL Final    Alkaline Phosphatase 11/15/2021 143  35 - 144 U/L Final    AST 11/15/2021 17  10 - 35 U/L Final    ALT 11/15/2021 40  9 - 46 U/L Final    Cholesterol 11/15/2021 198  <200 mg/dL Final    HDL 11/15/2021 49  > OR = 40 mg/dL Final    Triglycerides 11/15/2021 94  <150 mg/dL Final    LDL Cholesterol 11/15/2021 129* mg/dL (calc) Final    HDL/Cholesterol Ratio 11/15/2021 4.0  <5.0 (calc) Final    Non HDL Chol. (LDL+VLDL) 11/15/2021 149* <130 mg/dL (calc) Final   Office Visit on 10/12/2021   Component Date Value Ref Range Status    Color, UA 10/12/2021 Yellow   Final    pH, UA 10/12/2021 7   Final    WBC, UA 10/12/2021 neg   Final    Nitrite, UA 10/12/2021 neg   Final    Protein, POC 10/12/2021 neg   Final    Glucose, UA 10/12/2021 neg   Final    Ketones, UA 10/12/2021 neg   Final    Urobilinogen, UA 10/12/2021 0.2   Final    Bilirubin, POC 10/12/2021 neg   Final    Blood, UA 10/12/2021 neg   Final    Clarity, UA 10/12/2021 Clear   Final    Spec Grav UA 10/12/2021 1.025   Final    POC Residual Urine Volume 10/12/2021 0  0 - 100 mL Final       Past Medical History:   Diagnosis Date    Anticoagulant long-term use     Anxiety     Arthritis     Back pain     Cerebral palsy     Depression     GERD (gastroesophageal reflux disease)     Hypertension     Peptic ulcer of stomach     Seizures     AS A CHILD    Wears glasses     CONTACS     Past Surgical History:   Procedure Laterality Date    ANKLE SURGERY Left     BACK SURGERY      CYSTOSCOPY N/A 7/17/2018    Procedure: CYSTOSCOPY;  Surgeon: Jonathan Jacinto MD;  [facility];  Service: Urology;  Laterality: N/A;    CYSTOSCOPY N/A 4/6/2021     Procedure: CYSTOSCOPY;  Surgeon: Jonathan Jacinto MD;  Location: [facility] OR;  Service: Urology;  Laterality: N/A;    CYSTOSCOPY WITH INSERTION OF MINIMALLY INVASIVE IMPLANT TO ENLARGE PROSTATIC URETHRA N/A 8/2/2018    Procedure: CYSTOSCOPY, WITH INSERTION OF UROLIFT IMPLANT;  Surgeon: Jonathan Jacinto MD;  Location: [facility] OR;  Service: Urology;  Laterality: N/A;    LEG SURGERY Left     SHOULDER SURGERY Right     TRANSRECTAL ULTRASOUND EXAMINATION N/A 7/17/2018    Procedure: ULTRASOUND, TRANSRECTAL;  Surgeon: Jonathan Jacinto MD;  Location: [facility] OR;  Service: Urology;  Laterality: N/A;    TRANSURETHRAL RESECTION OF PROSTATE N/A 5/27/2021    Procedure: TURP (TRANSURETHRAL RESECTION OF PROSTATE);  Surgeon: Jonathan Jacinto MD;  Location: [facility] OR;  Service: Urology;  Laterality: N/A;     Family History   Problem Relation Age of Onset    Melanoma Father        Marital Status:   Alcohol History:  reports current alcohol use.  Tobacco History:  reports that he has been smoking pipe. He has never used smokeless tobacco.  Drug History:  reports no history of drug use.    Health Maintenance Topics with due status: Not Due       Topic Last Completion Date    Colorectal Cancer Screening 01/25/2013    Pneumococcal Vaccines (Age 0-64) 09/10/2015    Lipid Panel 11/15/2021     Immunization History   Administered Date(s) Administered    COVID-19, MRNA, LN-S, PF (MODERNA FULL 0.5 ML DOSE) 03/31/2021, 04/30/2021, 11/30/2021    Influenza 09/22/2014, 09/10/2015    Influenza (FLUBLOK) - Quadrivalent - Recombinant - PF *Preferred* (egg allergy) 09/10/2020, 11/15/2021    Influenza - Quadrivalent - PF *Preferred* (6 months and older) 09/19/2016, 09/12/2017, 11/08/2019    Influenza - Trivalent - Recombinant - PF 10/08/2018    Influenza A (H1N1) 2009 Monovalent - IM - PF 11/18/2009    Pneumococcal Polysaccharide - 23 Valent 09/10/2015       Review of patient's allergies indicates:   Allergen Reactions    Tylox  [oxycodone-acetaminophen] Nausea And Vomiting       Current Outpatient Medications:     amLODIPine (NORVASC) 10 MG tablet, Take 1 tablet (10 mg total) by mouth once daily., Disp: 90 tablet, Rfl: 1    aspirin (ECOTRIN) 81 MG EC tablet, Take 81 mg by mouth once daily., Disp: , Rfl:     atorvastatin (LIPITOR) 40 MG tablet, Take 1 tablet (40 mg total) by mouth once daily., Disp: 90 tablet, Rfl: 1    augmented betamethasone dipropionate (DIPROLENE-AF) 0.05 % cream, Use on AA of hand twice daily., Disp: 50 g, Rfl: 1    cyclobenzaprine (FLEXERIL) 5 MG tablet, Take 1 tablet (5 mg total) by mouth 3 (three) times daily as needed., Disp: 90 tablet, Rfl: 2    D3/E/Se/soy isofl/tocoph/lycop (PROSTATE 2.4 ORAL), Take by mouth., Disp: , Rfl:     DULoxetine (CYMBALTA) 60 MG capsule, Take 1 capsule (60 mg total) by mouth once daily., Disp: 90 capsule, Rfl: 1    fluticasone propionate (FLONASE) 50 mcg/actuation nasal spray, 1 spray (50 mcg total) by Each Nostril route as needed., Disp: 16 g, Rfl: 3    gabapentin (NEURONTIN) 300 MG capsule, Take 3 capsules (900 mg total) by mouth 3 (three) times daily., Disp: 810 capsule, Rfl: 1    hydroCHLOROthiazide (HYDRODIURIL) 25 MG tablet, Take 1 tablet (25 mg total) by mouth once daily., Disp: 90 tablet, Rfl: 1    HYDROcodone-acetaminophen (NORCO)  mg per tablet, Take 1 tablet by mouth every 6 (six) hours as needed for Pain., Disp: 90 tablet, Rfl: 0    [START ON 2/18/2022] HYDROcodone-acetaminophen (NORCO)  mg per tablet, Take 1 tablet by mouth every 6 (six) hours as needed for Pain., Disp: 90 tablet, Rfl: 0    HYDROcodone-acetaminophen (NORCO)  mg per tablet, Take 1 tablet by mouth every 6 (six) hours as needed for Pain., Disp: 90 tablet, Rfl: 0    KRILL OIL ORAL, Take by mouth., Disp: , Rfl:     multivit-min/folic/vit K/lycop (MEN'S 50 PLUS MULTIVITAMIN ORAL), Take 1 tablet by mouth once daily., Disp: , Rfl:     pantoprazole (PROTONIX) 40 MG tablet, Take 1  tablet (40 mg total) by mouth once daily., Disp: 90 tablet, Rfl: 1    sildenafiL (VIAGRA) 100 MG tablet, Take 1 tablet (100 mg total) by mouth as needed for Erectile Dysfunction., Disp: 6 tablet, Rfl: 6    triamcinolone acetonide 0.025% (KENALOG) 0.025 % cream, Apply topically 2 (two) times daily., Disp: 80 g, Rfl: 0    ALPRAZolam (XANAX) 1 MG tablet, Take 1 tablet (1 mg total) by mouth once daily., Disp: 60 tablet, Rfl: 2    amoxicillin-clavulanate 875-125mg (AUGMENTIN) 875-125 mg per tablet, Take 1 tablet by mouth 2 (two) times a day. for 7 days, Disp: 14 tablet, Rfl: 0    benzonatate (TESSALON) 200 MG capsule, Take 1 capsule (200 mg total) by mouth 3 (three) times daily as needed for Cough., Disp: 30 capsule, Rfl: 0    cetirizine (ZYRTEC) 10 MG tablet, Take 1 tablet (10 mg total) by mouth once daily., Disp: 90 tablet, Rfl: 1    oxybutynin (DITROPAN-XL) 10 MG 24 hr tablet, Take 1 tablet (10 mg total) by mouth once daily. (Patient not taking: No sig reported), Disp: 30 tablet, Rfl: 11    promethazine-dextromethorphan (PROMETHAZINE-DM) 6.25-15 mg/5 mL Syrp, Take 5 mLs by mouth nightly as needed., Disp: 180 mL, Rfl: 0    tamsulosin (FLOMAX) 0.4 mg Cap, Take 1 capsule (0.4 mg total) by mouth every evening. (Patient not taking: No sig reported), Disp: 30 capsule, Rfl: 11  No current facility-administered medications for this visit.    Facility-Administered Medications Ordered in Other Visits:     ampicillin 2 g in sodium chloride 0.9 % 100 mL IVPB (ready to mix system), 2 g, Intravenous, On Call Procedure, Jonathan Jacinto MD, 2 g at 05/27/21 1234    Review of Systems   Constitutional: Positive for chills and fatigue. Negative for activity change and fever.   HENT: Positive for congestion, postnasal drip and sore throat. Negative for ear discharge, ear pain and rhinorrhea.    Respiratory: Positive for cough. Negative for shortness of breath and wheezing.    Cardiovascular: Negative for chest pain,  "palpitations and leg swelling.   Gastrointestinal: Negative for abdominal pain, constipation, diarrhea, nausea and vomiting.   Genitourinary: Negative for difficulty urinating, dysuria and hematuria.   Musculoskeletal: Negative for arthralgias and myalgias.   Skin: Negative for rash.   Neurological: Negative for dizziness, syncope, weakness, light-headedness and headaches.          Objective:      Vitals:    02/09/22 1033   BP: 124/80   Pulse: (!) 112   Temp: 99.3 °F (37.4 °C)   SpO2: 95%   Weight: 101.6 kg (224 lb)   Height: 5' 7" (1.702 m)     Physical Exam  Vitals reviewed.   Constitutional:       General: He is not in acute distress.     Appearance: Normal appearance. He is obese. He is not ill-appearing, toxic-appearing or diaphoretic.   HENT:      Head: Normocephalic and atraumatic.      Right Ear: External ear normal.      Left Ear: External ear normal.      Nose: Nose normal. No congestion or rhinorrhea.      Mouth/Throat:      Mouth: Mucous membranes are moist.      Pharynx: Oropharynx is clear. Posterior oropharyngeal erythema (And injected) present. No oropharyngeal exudate.   Eyes:      General: Lids are normal. Allergic shiner present. No scleral icterus.        Right eye: No discharge.         Left eye: No discharge.      Extraocular Movements: Extraocular movements intact.      Conjunctiva/sclera: Conjunctivae normal.      Right eye: Right conjunctiva is not injected. No chemosis.     Left eye: Left conjunctiva is not injected. No chemosis.  Cardiovascular:      Rate and Rhythm: Normal rate and regular rhythm.      Pulses: Normal pulses.      Heart sounds: Normal heart sounds. No murmur heard.  No friction rub. No gallop.    Pulmonary:      Effort: Pulmonary effort is normal. No respiratory distress.      Breath sounds: Normal breath sounds. No wheezing, rhonchi or rales.      Comments: Adequate breath sounds with no signs of consolidation appreciated on auscultation in the bilateral lung " fields.  Abdominal:      General: There is no distension.      Palpations: Abdomen is soft.      Tenderness: There is no abdominal tenderness. There is no guarding or rebound.   Musculoskeletal:         General: Normal range of motion.      Cervical back: Normal range of motion and neck supple.      Right lower leg: No edema.      Left lower leg: No edema.   Lymphadenopathy:      Cervical: No cervical adenopathy.   Skin:     General: Skin is warm and dry.      Coloration: Skin is not jaundiced or pale.      Findings: No erythema.   Neurological:      Mental Status: He is alert. Mental status is at baseline.      Cranial Nerves: No cranial nerve deficit.      Gait: Gait normal.   Psychiatric:         Mood and Affect: Mood normal.         Behavior: Behavior normal. Behavior is cooperative.           Assessment:       1. Pansinusitis, unspecified chronicity    2. Cough in adult patient    3. Tachycardia    4. Essential hypertension    5. Hyperlipidemia, unspecified hyperlipidemia type    6. Gastroesophageal reflux disease, unspecified whether esophagitis present    7. BPH with obstruction/lower urinary tract symptoms    8. Anxiety disorder, unspecified type           Plan:       Pansinusitis, unspecified chronicity  Comments:  POCT COVID-19 swab today - Negative.  Start Augmentin 875-125mg b.i.d. x7 days.  Begin Zyrtec 10mg q.d. and Flonase nasal spray q.h.s.  Orders:  -     amoxicillin-clavulanate 875-125mg (AUGMENTIN) 875-125 mg per tablet; Take 1 tablet by mouth 2 (two) times a day. for 7 days  Dispense: 14 tablet; Refill: 0  -     cetirizine (ZYRTEC) 10 MG tablet; Take 1 tablet (10 mg total) by mouth once daily.  Dispense: 90 tablet; Refill: 1  -     POCT COVID-19 Rapid Screening    Cough in adult patient  Start p.r.n. promethazine-DM for cough suppression q.h.s.. - patient advised that this medication can cause drowsiness and should only be taken nightly.  Start p.r.n. Benzonate Perles t.i.d. for cough suppression  during the day.  -     promethazine-dextromethorphan (PROMETHAZINE-DM) 6.25-15 mg/5 mL Syrp; Take 5 mLs by mouth nightly as needed.  Dispense: 180 mL; Refill: 0  -     benzonatate (TESSALON) 200 MG capsule; Take 1 capsule (200 mg total) by mouth 3 (three) times daily as needed for Cough.  Dispense: 30 capsule; Refill: 0  -     POCT COVID-19 Rapid Screening    Tachycardia  Comments:  Tachycardic at 112 bpm today in office.    Suspected cause of secondary to recent caffeine consumption reported prior to coming into office visit today.  Steroids were avoided today.    Essential hypertension  Comments:  BP currently stable and well controlled.  Continue as is.    Hyperlipidemia, unspecified hyperlipidemia type    Gastroesophageal reflux disease, unspecified whether esophagitis present    BPH with obstruction/lower urinary tract symptoms    Anxiety disorder, unspecified type      Follow up if symptoms worsen or fail to improve, for Sinusitis.        2/9/2022 Alejandro Sequeira PA-C

## 2022-03-15 DIAGNOSIS — G89.4 CHRONIC PAIN SYNDROME: ICD-10-CM

## 2022-03-15 RX ORDER — HYDROCODONE BITARTRATE AND ACETAMINOPHEN 10; 325 MG/1; MG/1
1 TABLET ORAL EVERY 6 HOURS PRN
Qty: 90 TABLET | Refills: 0 | Status: SHIPPED | OUTPATIENT
Start: 2022-03-18 | End: 2022-06-16 | Stop reason: SDUPTHER

## 2022-04-06 DIAGNOSIS — K21.9 GASTROESOPHAGEAL REFLUX DISEASE, UNSPECIFIED WHETHER ESOPHAGITIS PRESENT: ICD-10-CM

## 2022-04-06 DIAGNOSIS — G89.4 CHRONIC PAIN SYNDROME: ICD-10-CM

## 2022-04-06 RX ORDER — PANTOPRAZOLE SODIUM 40 MG/1
40 TABLET, DELAYED RELEASE ORAL DAILY
Qty: 90 TABLET | Refills: 1 | Status: SHIPPED | OUTPATIENT
Start: 2022-04-06 | End: 2022-10-03 | Stop reason: SDUPTHER

## 2022-04-06 RX ORDER — GABAPENTIN 300 MG/1
900 CAPSULE ORAL 3 TIMES DAILY
Qty: 810 CAPSULE | Refills: 1 | Status: SHIPPED | OUTPATIENT
Start: 2022-04-06 | End: 2023-01-13 | Stop reason: SDUPTHER

## 2022-04-06 RX ORDER — CYCLOBENZAPRINE HCL 5 MG
5 TABLET ORAL 3 TIMES DAILY PRN
Qty: 90 TABLET | Refills: 2 | Status: SHIPPED | OUTPATIENT
Start: 2022-04-06 | End: 2022-06-07 | Stop reason: SDUPTHER

## 2022-04-18 DIAGNOSIS — G89.4 CHRONIC PAIN SYNDROME: ICD-10-CM

## 2022-04-18 RX ORDER — HYDROCODONE BITARTRATE AND ACETAMINOPHEN 10; 325 MG/1; MG/1
1 TABLET ORAL EVERY 6 HOURS PRN
Qty: 90 TABLET | Refills: 0 | Status: SHIPPED | OUTPATIENT
Start: 2022-04-18 | End: 2022-05-17 | Stop reason: SDUPTHER

## 2022-04-18 NOTE — TELEPHONE ENCOUNTER
----- Message from Mary Anne Glez sent at 4/18/2022 11:40 AM CDT -----  Pt's wife Aura calling for [facility] on [facility]   # [phone]

## 2022-04-20 ENCOUNTER — TELEPHONE (OUTPATIENT)
Dept: FAMILY MEDICINE | Facility: CLINIC | Age: 62
End: 2022-04-20

## 2022-04-20 NOTE — TELEPHONE ENCOUNTER
----- Message from Phyllis Ballard sent at 4/20/2022  1:23 PM CDT -----  Pt wife dropped of long term disability forms to be faxed   [phone]

## 2022-05-02 ENCOUNTER — TELEPHONE (OUTPATIENT)
Dept: FAMILY MEDICINE | Facility: CLINIC | Age: 62
End: 2022-05-02

## 2022-05-02 DIAGNOSIS — E78.5 HYPERLIPIDEMIA, UNSPECIFIED HYPERLIPIDEMIA TYPE: ICD-10-CM

## 2022-05-02 DIAGNOSIS — I10 ESSENTIAL HYPERTENSION: ICD-10-CM

## 2022-05-02 DIAGNOSIS — Z79.899 ENCOUNTER FOR LONG-TERM (CURRENT) USE OF OTHER MEDICATIONS: Primary | ICD-10-CM

## 2022-05-05 ENCOUNTER — TELEPHONE (OUTPATIENT)
Dept: FAMILY MEDICINE | Facility: CLINIC | Age: 62
End: 2022-05-05

## 2022-05-05 NOTE — TELEPHONE ENCOUNTER
----- Message from Mary Anne Glez sent at 5/5/2022 10:08 AM CDT -----  Pt calling back to a Vm Dr. Chacon left on his VM this morning. Said he is not seeing any other providers and is not taking any other medications or pain med's from any other provider. The left side of body is the affected and small due to the cerebral palsy If we have more questions to call him back and would like a confirmation call to make sure this info is received and it all gets faxed in a timely manner since this is a time sensitive issue.   # [phone]

## 2022-05-16 ENCOUNTER — OFFICE VISIT (OUTPATIENT)
Dept: FAMILY MEDICINE | Facility: CLINIC | Age: 62
End: 2022-05-16
Payer: MEDICARE

## 2022-05-16 VITALS
HEART RATE: 80 BPM | DIASTOLIC BLOOD PRESSURE: 92 MMHG | BODY MASS INDEX: 34.84 KG/M2 | HEIGHT: 67 IN | OXYGEN SATURATION: 99 % | WEIGHT: 222 LBS | SYSTOLIC BLOOD PRESSURE: 144 MMHG

## 2022-05-16 DIAGNOSIS — M54.12 RADICULITIS OF RIGHT CERVICAL REGION: Primary | ICD-10-CM

## 2022-05-16 DIAGNOSIS — Z79.899 ENCOUNTER FOR LONG-TERM (CURRENT) USE OF OTHER MEDICATIONS: ICD-10-CM

## 2022-05-16 DIAGNOSIS — E78.5 HYPERLIPIDEMIA, UNSPECIFIED HYPERLIPIDEMIA TYPE: ICD-10-CM

## 2022-05-16 DIAGNOSIS — F41.9 ANXIETY DISORDER, UNSPECIFIED TYPE: ICD-10-CM

## 2022-05-16 DIAGNOSIS — Z51.81 ENCOUNTER FOR THERAPEUTIC DRUG MONITORING: ICD-10-CM

## 2022-05-16 DIAGNOSIS — I10 ESSENTIAL HYPERTENSION: ICD-10-CM

## 2022-05-16 PROCEDURE — 1159F PR MEDICATION LIST DOCUMENTED IN MEDICAL RECORD: ICD-10-PCS | Mod: CPTII,S$GLB,, | Performed by: PHYSICIAN ASSISTANT

## 2022-05-16 PROCEDURE — 3080F DIAST BP >= 90 MM HG: CPT | Mod: CPTII,S$GLB,, | Performed by: PHYSICIAN ASSISTANT

## 2022-05-16 PROCEDURE — 3008F BODY MASS INDEX DOCD: CPT | Mod: CPTII,S$GLB,, | Performed by: PHYSICIAN ASSISTANT

## 2022-05-16 PROCEDURE — 99214 OFFICE O/P EST MOD 30 MIN: CPT | Mod: S$GLB,,, | Performed by: PHYSICIAN ASSISTANT

## 2022-05-16 PROCEDURE — 3077F SYST BP >= 140 MM HG: CPT | Mod: CPTII,S$GLB,, | Performed by: PHYSICIAN ASSISTANT

## 2022-05-16 PROCEDURE — 3080F PR MOST RECENT DIASTOLIC BLOOD PRESSURE >= 90 MM HG: ICD-10-PCS | Mod: CPTII,S$GLB,, | Performed by: PHYSICIAN ASSISTANT

## 2022-05-16 PROCEDURE — 3077F PR MOST RECENT SYSTOLIC BLOOD PRESSURE >= 140 MM HG: ICD-10-PCS | Mod: CPTII,S$GLB,, | Performed by: PHYSICIAN ASSISTANT

## 2022-05-16 PROCEDURE — 99214 PR OFFICE/OUTPT VISIT, EST, LEVL IV, 30-39 MIN: ICD-10-PCS | Mod: S$GLB,,, | Performed by: PHYSICIAN ASSISTANT

## 2022-05-16 PROCEDURE — 1159F MED LIST DOCD IN RCRD: CPT | Mod: CPTII,S$GLB,, | Performed by: PHYSICIAN ASSISTANT

## 2022-05-16 PROCEDURE — 3008F PR BODY MASS INDEX (BMI) DOCUMENTED: ICD-10-PCS | Mod: CPTII,S$GLB,, | Performed by: PHYSICIAN ASSISTANT

## 2022-05-16 RX ORDER — AMLODIPINE BESYLATE 10 MG/1
10 TABLET ORAL DAILY
Qty: 90 TABLET | Refills: 1 | Status: SHIPPED | OUTPATIENT
Start: 2022-05-16 | End: 2022-11-25 | Stop reason: SDUPTHER

## 2022-05-16 RX ORDER — ATORVASTATIN CALCIUM 40 MG/1
40 TABLET, FILM COATED ORAL DAILY
Qty: 90 TABLET | Refills: 1 | Status: SHIPPED | OUTPATIENT
Start: 2022-05-16 | End: 2022-11-25 | Stop reason: SDUPTHER

## 2022-05-16 RX ORDER — ALPRAZOLAM 1 MG/1
1 TABLET ORAL DAILY
Qty: 60 TABLET | Refills: 2 | Status: SHIPPED | OUTPATIENT
Start: 2022-05-16 | End: 2022-06-28 | Stop reason: SDUPTHER

## 2022-05-16 NOTE — PROGRESS NOTES
SUBJECTIVE:    Patient ID: Patrick Wilson is a 62 y.o. male.    Chief Complaint: Follow-up (6 mo f/u//complains of right hand numbness//last dose of norco,xanax and gabapentin this am//no med bottles//tc)    This is a 62-year-old male who presents today for chief complaint of right hand numbness. Says that it has been worsening over the past several weeks. Mostly numbness. He is aware of RC cuff issues and being recommended for surgery. Neck has not been fully evaluated. Has seen chiropractor in the past with some relief. Had bad accident in the mid 90s in his bread truck. He does manage with norco, and gabapentin. Dr. Chacon has been managing.      Office Visit on 02/09/2022   Component Date Value Ref Range Status    POC Rapid COVID 02/09/2022 Negative  Negative Final     Acceptable 02/09/2022 Yes   Final       Past Medical History:   Diagnosis Date    Anticoagulant long-term use     Anxiety     Arthritis     Back pain     Cerebral palsy     Depression     GERD (gastroesophageal reflux disease)     Hypertension     Peptic ulcer of stomach     Seizures     AS A CHILD    Wears glasses     CONTACS     Past Surgical History:   Procedure Laterality Date    ANKLE SURGERY Left     BACK SURGERY      CYSTOSCOPY N/A 7/17/2018    Procedure: CYSTOSCOPY;  Surgeon: Jonathan Jacinto MD;  Location: [facility] OR;  Service: Urology;  Laterality: N/A;    CYSTOSCOPY N/A 4/6/2021    Procedure: CYSTOSCOPY;  Surgeon: Jonathan Jacinto MD;  Location: [facility] OR;  Service: Urology;  Laterality: N/A;    CYSTOSCOPY WITH INSERTION OF MINIMALLY INVASIVE IMPLANT TO ENLARGE PROSTATIC URETHRA N/A 8/2/2018    Procedure: CYSTOSCOPY, WITH INSERTION OF UROLIFT IMPLANT;  Surgeon: Jonathan Jacinto MD;  Location: [facility] OR;  Service: Urology;  Laterality: N/A;    LEG SURGERY Left     SHOULDER SURGERY Right     TRANSRECTAL ULTRASOUND EXAMINATION N/A 7/17/2018    Procedure: ULTRASOUND, TRANSRECTAL;  Surgeon:  Jonathan Jacinto MD;  Location: [facility] OR;  Service: Urology;  Laterality: N/A;    TRANSURETHRAL RESECTION OF PROSTATE N/A 5/27/2021    Procedure: TURP (TRANSURETHRAL RESECTION OF PROSTATE);  Surgeon: Jonathan Jacinto MD;  Location: [facility] OR;  Service: Urology;  Laterality: N/A;     Family History   Problem Relation Age of Onset    Melanoma Father        Marital Status:   Alcohol History:  reports current alcohol use.  Tobacco History:  reports that he has been smoking pipe. He has never used smokeless tobacco.  Drug History:  reports no history of drug use.    Review of patient's allergies indicates:   Allergen Reactions    Tylox [oxycodone-acetaminophen] Nausea And Vomiting       Current Outpatient Medications:     ALPRAZolam (XANAX) 1 MG tablet, Take 1 tablet (1 mg total) by mouth once daily., Disp: 60 tablet, Rfl: 2    amLODIPine (NORVASC) 10 MG tablet, Take 1 tablet (10 mg total) by mouth once daily., Disp: 90 tablet, Rfl: 1    aspirin (ECOTRIN) 81 MG EC tablet, Take 81 mg by mouth once daily., Disp: , Rfl:     atorvastatin (LIPITOR) 40 MG tablet, Take 1 tablet (40 mg total) by mouth once daily., Disp: 90 tablet, Rfl: 1    augmented betamethasone dipropionate (DIPROLENE-AF) 0.05 % cream, Use on AA of hand twice daily., Disp: 50 g, Rfl: 1    cetirizine (ZYRTEC) 10 MG tablet, Take 1 tablet (10 mg total) by mouth once daily., Disp: 90 tablet, Rfl: 1    cyclobenzaprine (FLEXERIL) 5 MG tablet, Take 1 tablet (5 mg total) by mouth 3 (three) times daily as needed., Disp: 90 tablet, Rfl: 2    D3/E/Se/soy isofl/tocoph/lycop (PROSTATE 2.4 ORAL), Take by mouth., Disp: , Rfl:     DULoxetine (CYMBALTA) 60 MG capsule, Take 1 capsule (60 mg total) by mouth once daily., Disp: 90 capsule, Rfl: 1    fluticasone propionate (FLONASE) 50 mcg/actuation nasal spray, 1 spray (50 mcg total) by Each Nostril route as needed., Disp: 16 g, Rfl: 3    gabapentin (NEURONTIN) 300 MG capsule, Take 3 capsules (900 mg total)  by mouth 3 (three) times daily., Disp: 810 capsule, Rfl: 1    hydroCHLOROthiazide (HYDRODIURIL) 25 MG tablet, Take 1 tablet (25 mg total) by mouth once daily., Disp: 90 tablet, Rfl: 1    HYDROcodone-acetaminophen (NORCO)  mg per tablet, Take 1 tablet by mouth every 6 (six) hours as needed for Pain., Disp: 90 tablet, Rfl: 0    HYDROcodone-acetaminophen (NORCO)  mg per tablet, Take 1 tablet by mouth every 6 (six) hours as needed for Pain., Disp: 90 tablet, Rfl: 0    HYDROcodone-acetaminophen (NORCO)  mg per tablet, Take 1 tablet by mouth every 6 (six) hours as needed for Pain., Disp: 90 tablet, Rfl: 0    KRILL OIL ORAL, Take by mouth., Disp: , Rfl:     multivit-min/folic/vit K/lycop (MEN'S 50 PLUS MULTIVITAMIN ORAL), Take 1 tablet by mouth once daily., Disp: , Rfl:     oxybutynin (DITROPAN-XL) 10 MG 24 hr tablet, Take 1 tablet (10 mg total) by mouth once daily. (Patient not taking: No sig reported), Disp: 30 tablet, Rfl: 11    pantoprazole (PROTONIX) 40 MG tablet, Take 1 tablet (40 mg total) by mouth once daily., Disp: 90 tablet, Rfl: 1    sildenafiL (VIAGRA) 100 MG tablet, Take 1 tablet (100 mg total) by mouth as needed for Erectile Dysfunction., Disp: 6 tablet, Rfl: 6    tamsulosin (FLOMAX) 0.4 mg Cap, Take 1 capsule (0.4 mg total) by mouth every evening. (Patient not taking: No sig reported), Disp: 30 capsule, Rfl: 11    triamcinolone acetonide 0.025% (KENALOG) 0.025 % cream, Apply topically 2 (two) times daily., Disp: 80 g, Rfl: 0  No current facility-administered medications for this visit.    Facility-Administered Medications Ordered in Other Visits:     ampicillin 2 g in sodium chloride 0.9 % 100 mL IVPB (ready to mix system), 2 g, Intravenous, On Call Procedure, Jonathan Jacinto MD, 2 g at 05/27/21 1234    Review of Systems   Constitutional: Negative for diaphoresis, fatigue and unexpected weight change.   HENT: Negative for sinus pain and sore throat.    Eyes: Negative for  "pain, redness and visual disturbance.   Respiratory: Negative for cough, chest tightness, shortness of breath and wheezing.    Cardiovascular: Negative for chest pain and palpitations.   Gastrointestinal: Negative for abdominal pain, blood in stool, constipation, diarrhea, nausea and vomiting.   Endocrine: Negative for polydipsia, polyphagia and polyuria.   Genitourinary: Positive for frequency and urgency. Negative for dysuria and hematuria.        Positive for nocturia.   Musculoskeletal: Positive for arthralgias (Shoulders, knees, chronic), gait problem (Occasional falls secondary to CP) and myalgias. Negative for back pain.   Skin: Positive for rash (White, scaly bumps on legs and trunk bilaterally).   Allergic/Immunologic: Negative for environmental allergies.   Neurological: Positive for numbness (Right upper extremity). Negative for dizziness, syncope and headaches.   Psychiatric/Behavioral: Positive for sleep disturbance (Secondary to snoring and nocturia). Negative for behavioral problems, dysphoric mood, self-injury and suicidal ideas. The patient is not nervous/anxious.           Objective:      Vitals:    05/16/22 0907 05/16/22 0916   BP: (!) 140/92 (!) 144/92   Pulse: 80    SpO2: 99%    Weight: 100.7 kg (222 lb)    Height: 5' 7" (1.702 m)      Physical Exam  Vitals and nursing note reviewed.   Constitutional:       General: He is awake. He is not in acute distress.     Appearance: Normal appearance. He is obese. He is not ill-appearing.   HENT:      Head: Normocephalic and atraumatic.      Right Ear: External ear normal.      Left Ear: External ear normal.      Nose: Nose normal.      Mouth/Throat:      Lips: Pink.      Mouth: Mucous membranes are moist.      Dentition: Normal dentition.      Pharynx: Oropharynx is clear. Uvula midline.      Tonsils: No tonsillar exudate.   Eyes:      General: Vision grossly intact. Gaze aligned appropriately.      Extraocular Movements: Extraocular movements intact.    "   Conjunctiva/sclera: Conjunctivae normal.   Neck:      Thyroid: No thyroid mass.   Cardiovascular:      Rate and Rhythm: Normal rate and regular rhythm.      Pulses: Normal pulses.           Radial pulses are 2+ on the right side and 2+ on the left side.        Dorsalis pedis pulses are 2+ on the right side and 2+ on the left side.      Heart sounds: Normal heart sounds, S1 normal and S2 normal. No murmur heard.  Pulmonary:      Effort: Pulmonary effort is normal.      Breath sounds: Normal breath sounds and air entry. No wheezing or rales.   Abdominal:      General: Abdomen is flat. Bowel sounds are normal. There is no distension.      Tenderness: There is no abdominal tenderness.   Musculoskeletal:      Right shoulder: No swelling, tenderness or bony tenderness. Normal range of motion.      Left shoulder: No swelling, tenderness or bony tenderness. Normal range of motion.      Cervical back: Spasms and tenderness present. Decreased range of motion.      Right knee: No swelling, erythema, bony tenderness or crepitus. Normal range of motion. No tenderness.      Left knee: No swelling, erythema, bony tenderness or crepitus. Normal range of motion. No tenderness.      Right lower leg: No edema.      Left lower leg: No edema.   Skin:     General: Skin is warm and dry.      Capillary Refill: Capillary refill takes less than 2 seconds.   Neurological:      General: No focal deficit present.      Mental Status: He is alert and oriented to person, place, and time.   Psychiatric:         Attention and Perception: Attention normal.         Mood and Affect: Mood normal. Mood is not depressed (States that he has been through a lot but is happy where he is now). Affect is blunt (Talks slowly and with little inflection).         Behavior: Behavior is cooperative.           Assessment:       1. Radiculitis of right cervical region    2. Encounter for long-term (current) use of other medications    3. Encounter for therapeutic  drug monitoring    4. Anxiety disorder, unspecified type    5. Essential hypertension    6. Hyperlipidemia, unspecified hyperlipidemia type         Plan:       Radiculitis of right cervical region  Comments:  Going to check xrays now. consider PT/chiropractic efforts and if no better we will need to get MRI for further evaluation.  Orders:  -     X-Ray Cervical Spine Complete 5 view; Future; Expected date: 05/16/2022    Encounter for long-term (current) use of other medications  -     DRUG MONITOR, PANEL 4, W/CONF, URINE; Future; Expected date: 05/16/2022    Encounter for therapeutic drug monitoring  -     DRUG MONITOR, PANEL 4, W/CONF, URINE; Future; Expected date: 05/16/2022    Anxiety disorder, unspecified type  Comments:  mood and anxiety appear to be stable at this time. continue as is.  Orders:  -     ALPRAZolam (XANAX) 1 MG tablet; Take 1 tablet (1 mg total) by mouth once daily.  Dispense: 60 tablet; Refill: 2    Essential hypertension  Comments:  Doing well. rechecked by me. refills today.  Orders:  -     amLODIPine (NORVASC) 10 MG tablet; Take 1 tablet (10 mg total) by mouth once daily.  Dispense: 90 tablet; Refill: 1    Hyperlipidemia, unspecified hyperlipidemia type  Comments:  Refills today  Orders:  -     atorvastatin (LIPITOR) 40 MG tablet; Take 1 tablet (40 mg total) by mouth once daily.  Dispense: 90 tablet; Refill: 1      Follow up in about 8 weeks (around 7/11/2022).        5/16/2022 Yaya Nick PA-C

## 2022-05-17 ENCOUNTER — TELEPHONE (OUTPATIENT)
Dept: FAMILY MEDICINE | Facility: CLINIC | Age: 62
End: 2022-05-17

## 2022-05-17 DIAGNOSIS — G89.4 CHRONIC PAIN SYNDROME: ICD-10-CM

## 2022-05-17 RX ORDER — HYDROCODONE BITARTRATE AND ACETAMINOPHEN 10; 325 MG/1; MG/1
1 TABLET ORAL EVERY 6 HOURS PRN
Qty: 90 TABLET | Refills: 0 | Status: SHIPPED | OUTPATIENT
Start: 2022-05-18 | End: 2023-11-13 | Stop reason: SDUPTHER

## 2022-05-17 RX ORDER — HYDROCODONE BITARTRATE AND ACETAMINOPHEN 10; 325 MG/1; MG/1
1 TABLET ORAL EVERY 6 HOURS PRN
Qty: 90 TABLET | Refills: 0 | Status: CANCELLED | OUTPATIENT
Start: 2022-05-17

## 2022-05-17 NOTE — TELEPHONE ENCOUNTER
----- Message from Ligia Carrillo sent at 5/17/2022 10:39 AM CDT -----  Patient wife called and stated he need a refill of his hydrocodone-acetaminophen called into [facility] if any questions please give her a call [phone]

## 2022-06-07 DIAGNOSIS — F41.9 ANXIETY DISORDER, UNSPECIFIED TYPE: ICD-10-CM

## 2022-06-07 DIAGNOSIS — G89.4 CHRONIC PAIN SYNDROME: ICD-10-CM

## 2022-06-07 RX ORDER — DULOXETIN HYDROCHLORIDE 60 MG/1
60 CAPSULE, DELAYED RELEASE ORAL DAILY
Qty: 90 CAPSULE | Refills: 1 | Status: SHIPPED | OUTPATIENT
Start: 2022-06-07 | End: 2022-12-08 | Stop reason: SDUPTHER

## 2022-06-07 RX ORDER — CYCLOBENZAPRINE HCL 5 MG
5 TABLET ORAL 3 TIMES DAILY PRN
Qty: 90 TABLET | Refills: 2 | Status: SHIPPED | OUTPATIENT
Start: 2022-06-07 | End: 2023-01-13 | Stop reason: SDUPTHER

## 2022-06-16 ENCOUNTER — TELEPHONE (OUTPATIENT)
Dept: FAMILY MEDICINE | Facility: CLINIC | Age: 62
End: 2022-06-16

## 2022-06-16 DIAGNOSIS — G89.4 CHRONIC PAIN SYNDROME: ICD-10-CM

## 2022-06-16 RX ORDER — HYDROCODONE BITARTRATE AND ACETAMINOPHEN 10; 325 MG/1; MG/1
1 TABLET ORAL EVERY 6 HOURS PRN
Qty: 90 TABLET | Refills: 0 | Status: SHIPPED | OUTPATIENT
Start: 2022-06-16 | End: 2022-07-18 | Stop reason: SDUPTHER

## 2022-06-29 ENCOUNTER — TELEPHONE (OUTPATIENT)
Dept: FAMILY MEDICINE | Facility: CLINIC | Age: 62
End: 2022-06-29

## 2022-06-29 NOTE — TELEPHONE ENCOUNTER
Please have the letter typed up for polycythemia that will allow the patient to routinely phlebotomize every 3 months to hematocrit less than 50

## 2022-06-29 NOTE — TELEPHONE ENCOUNTER
----- Message from Grisel Ybarra MA sent at 6/29/2022  9:31 AM CDT -----    ----- Message -----  From: Alban Serrano  Sent: 6/29/2022   9:28 AM CDT  To: Jonathan Chacon Staff    Pt wanted to know why he can't give blood because his blood is to thick. He said Dr. Chacon wrote a letter before stating he could give blood.  [phone]

## 2022-06-29 NOTE — TELEPHONE ENCOUNTER
Patient state he went to blood center to give blood. He was told they cant take his blood because it was to thick. He would like to know what to do.

## 2022-06-30 LAB
ALBUMIN SERPL-MCNC: 4.3 G/DL (ref 3.6–5.1)
ALBUMIN/CREAT UR: 161 MCG/MG CREAT
ALBUMIN/GLOB SERPL: 1.6 (CALC) (ref 1–2.5)
ALP SERPL-CCNC: 131 U/L (ref 35–144)
ALT SERPL-CCNC: 43 U/L (ref 9–46)
APPEARANCE UR: CLEAR
AST SERPL-CCNC: 21 U/L (ref 10–35)
BACTERIA #/AREA URNS HPF: ABNORMAL /HPF
BACTERIA UR CULT: ABNORMAL
BASOPHILS # BLD AUTO: 29 CELLS/UL (ref 0–200)
BASOPHILS NFR BLD AUTO: 0.5 %
BILIRUB SERPL-MCNC: 0.5 MG/DL (ref 0.2–1.2)
BILIRUB UR QL STRIP: NEGATIVE
BUN SERPL-MCNC: 12 MG/DL (ref 7–25)
BUN/CREAT SERPL: ABNORMAL (CALC) (ref 6–22)
CALCIUM SERPL-MCNC: 9.5 MG/DL (ref 8.6–10.3)
CHLORIDE SERPL-SCNC: 101 MMOL/L (ref 98–110)
CHOLEST SERPL-MCNC: 188 MG/DL
CHOLEST/HDLC SERPL: 3.4 (CALC)
CO2 SERPL-SCNC: 27 MMOL/L (ref 20–32)
COLOR UR: ABNORMAL
CREAT SERPL-MCNC: 0.75 MG/DL (ref 0.7–1.25)
CREAT UR-MCNC: 105 MG/DL (ref 20–320)
EOSINOPHIL # BLD AUTO: 220 CELLS/UL (ref 15–500)
EOSINOPHIL NFR BLD AUTO: 3.8 %
ERYTHROCYTE [DISTWIDTH] IN BLOOD BY AUTOMATED COUNT: 12.9 % (ref 11–15)
GLOBULIN SER CALC-MCNC: 2.7 G/DL (CALC) (ref 1.9–3.7)
GLUCOSE SERPL-MCNC: 117 MG/DL (ref 65–99)
GLUCOSE UR QL STRIP: NEGATIVE
HCT VFR BLD AUTO: 48.3 % (ref 38.5–50)
HDLC SERPL-MCNC: 56 MG/DL
HGB BLD-MCNC: 16.5 G/DL (ref 13.2–17.1)
HGB UR QL STRIP: NEGATIVE
HYALINE CASTS #/AREA URNS LPF: ABNORMAL /LPF
KETONES UR QL STRIP: NEGATIVE
LDLC SERPL CALC-MCNC: 110 MG/DL (CALC)
LEUKOCYTE ESTERASE UR QL STRIP: NEGATIVE
LYMPHOCYTES # BLD AUTO: 1496 CELLS/UL (ref 850–3900)
LYMPHOCYTES NFR BLD AUTO: 25.8 %
MCH RBC QN AUTO: 29.2 PG (ref 27–33)
MCHC RBC AUTO-ENTMCNC: 34.2 G/DL (ref 32–36)
MCV RBC AUTO: 85.5 FL (ref 80–100)
MICROALBUMIN UR-MCNC: 16.9 MG/DL
MONOCYTES # BLD AUTO: 481 CELLS/UL (ref 200–950)
MONOCYTES NFR BLD AUTO: 8.3 %
NEUTROPHILS # BLD AUTO: 3573 CELLS/UL (ref 1500–7800)
NEUTROPHILS NFR BLD AUTO: 61.6 %
NITRITE UR QL STRIP: NEGATIVE
NONHDLC SERPL-MCNC: 132 MG/DL (CALC)
PH UR STRIP: 6 [PH] (ref 5–8)
PLATELET # BLD AUTO: 269 THOUSAND/UL (ref 140–400)
PMV BLD REES-ECKER: 10 FL (ref 7.5–12.5)
POTASSIUM SERPL-SCNC: 3.6 MMOL/L (ref 3.5–5.3)
PROT SERPL-MCNC: 7 G/DL (ref 6.1–8.1)
PROT UR QL STRIP: ABNORMAL
RBC # BLD AUTO: 5.65 MILLION/UL (ref 4.2–5.8)
RBC #/AREA URNS HPF: ABNORMAL /HPF
SERVICE CMNT-IMP: ABNORMAL
SODIUM SERPL-SCNC: 139 MMOL/L (ref 135–146)
SP GR UR STRIP: 1.02 (ref 1–1.03)
SQUAMOUS #/AREA URNS HPF: ABNORMAL /HPF
TRIGL SERPL-MCNC: 112 MG/DL
TSH SERPL-ACNC: 1.82 MIU/L (ref 0.4–4.5)
WBC # BLD AUTO: 5.8 THOUSAND/UL (ref 3.8–10.8)
WBC #/AREA URNS HPF: ABNORMAL /HPF

## 2022-07-01 LAB
1OH-MIDAZOLAM UR-MCNC: NEGATIVE NG/ML
7AMINOCLONAZEPAM UR-MCNC: 120 NG/ML
A-OH ALPRAZ UR-MCNC: 88 NG/ML
A-OH-TRIAZOLAM UR-MCNC: NEGATIVE NG/ML
AMPHETAMINES UR QL: NEGATIVE NG/ML
BARBITURATES UR QL: NEGATIVE NG/ML
BENZODIAZ UR QL: POSITIVE NG/ML
BZE UR QL: NEGATIVE NG/ML
CODEINE UR-MCNC: NEGATIVE NG/ML
CREAT UR-MCNC: 109.9 MG/DL
DRUG SCREEN COMMENT UR-IMP: ABNORMAL
DRUG SCREEN COMMENT UR-IMP: ABNORMAL
HYDROCODONE UR-MCNC: 2060 NG/ML
HYDROMORPHONE UR-MCNC: 207 NG/ML
LORAZEPAM UR-MCNC: NEGATIVE NG/ML
METHADONE UR QL: NEGATIVE NG/ML
MORPHINE UR-MCNC: NEGATIVE NG/ML
NORDIAZEPAM UR-MCNC: NEGATIVE NG/ML
NORHYDROCODONE UR CFM-MCNC: 1117 NG/ML
NOTES AND COMMENTS: ABNORMAL
OH-ETHYLFLURAZ UR-MCNC: NEGATIVE NG/ML
OPIATES UR QL: POSITIVE NG/ML
OXAZEPAM UR-MCNC: NEGATIVE NG/ML
OXIDANTS UR QL: NEGATIVE MCG/ML
OXYCODONE UR QL: NEGATIVE NG/ML
PCP UR QL: NEGATIVE NG/ML
PH UR: 6 [PH] (ref 4.5–9)
TEMAZEPAM UR-MCNC: NEGATIVE NG/ML

## 2022-07-05 ENCOUNTER — PATIENT MESSAGE (OUTPATIENT)
Dept: FAMILY MEDICINE | Facility: CLINIC | Age: 62
End: 2022-07-05

## 2022-07-05 NOTE — TELEPHONE ENCOUNTER
Pt Concerned that blood sugar was elevated. Pt states he was fasting. Pt would like to know if further work up needs to be done. Do you want him to do A1C?

## 2022-07-11 ENCOUNTER — OFFICE VISIT (OUTPATIENT)
Dept: FAMILY MEDICINE | Facility: CLINIC | Age: 62
End: 2022-07-11
Payer: MEDICARE

## 2022-07-11 VITALS
BODY MASS INDEX: 35.47 KG/M2 | HEIGHT: 67 IN | DIASTOLIC BLOOD PRESSURE: 86 MMHG | SYSTOLIC BLOOD PRESSURE: 136 MMHG | HEART RATE: 96 BPM | WEIGHT: 226 LBS

## 2022-07-11 DIAGNOSIS — M54.12 RADICULITIS OF RIGHT CERVICAL REGION: ICD-10-CM

## 2022-07-11 DIAGNOSIS — Z79.899 ENCOUNTER FOR LONG-TERM (CURRENT) USE OF OTHER MEDICATIONS: ICD-10-CM

## 2022-07-11 DIAGNOSIS — G89.4 CHRONIC PAIN SYNDROME: Primary | ICD-10-CM

## 2022-07-11 DIAGNOSIS — I10 ESSENTIAL HYPERTENSION: ICD-10-CM

## 2022-07-11 PROCEDURE — 3075F SYST BP GE 130 - 139MM HG: CPT | Mod: CPTII,S$GLB,, | Performed by: PHYSICIAN ASSISTANT

## 2022-07-11 PROCEDURE — 99214 PR OFFICE/OUTPT VISIT, EST, LEVL IV, 30-39 MIN: ICD-10-PCS | Mod: S$GLB,,, | Performed by: PHYSICIAN ASSISTANT

## 2022-07-11 PROCEDURE — 3066F PR DOCUMENTATION OF TREATMENT FOR NEPHROPATHY: ICD-10-PCS | Mod: CPTII,S$GLB,, | Performed by: PHYSICIAN ASSISTANT

## 2022-07-11 PROCEDURE — 1159F MED LIST DOCD IN RCRD: CPT | Mod: CPTII,S$GLB,, | Performed by: PHYSICIAN ASSISTANT

## 2022-07-11 PROCEDURE — 3079F DIAST BP 80-89 MM HG: CPT | Mod: CPTII,S$GLB,, | Performed by: PHYSICIAN ASSISTANT

## 2022-07-11 PROCEDURE — 3060F POS MICROALBUMINURIA REV: CPT | Mod: CPTII,S$GLB,, | Performed by: PHYSICIAN ASSISTANT

## 2022-07-11 PROCEDURE — 3060F PR POS MICROALBUMINURIA RESULT DOCUMENTED/REVIEW: ICD-10-PCS | Mod: CPTII,S$GLB,, | Performed by: PHYSICIAN ASSISTANT

## 2022-07-11 PROCEDURE — 3008F PR BODY MASS INDEX (BMI) DOCUMENTED: ICD-10-PCS | Mod: CPTII,S$GLB,, | Performed by: PHYSICIAN ASSISTANT

## 2022-07-11 PROCEDURE — 99214 OFFICE O/P EST MOD 30 MIN: CPT | Mod: S$GLB,,, | Performed by: PHYSICIAN ASSISTANT

## 2022-07-11 PROCEDURE — 3066F NEPHROPATHY DOC TX: CPT | Mod: CPTII,S$GLB,, | Performed by: PHYSICIAN ASSISTANT

## 2022-07-11 PROCEDURE — 1159F PR MEDICATION LIST DOCUMENTED IN MEDICAL RECORD: ICD-10-PCS | Mod: CPTII,S$GLB,, | Performed by: PHYSICIAN ASSISTANT

## 2022-07-11 PROCEDURE — 3075F PR MOST RECENT SYSTOLIC BLOOD PRESS GE 130-139MM HG: ICD-10-PCS | Mod: CPTII,S$GLB,, | Performed by: PHYSICIAN ASSISTANT

## 2022-07-11 PROCEDURE — 3008F BODY MASS INDEX DOCD: CPT | Mod: CPTII,S$GLB,, | Performed by: PHYSICIAN ASSISTANT

## 2022-07-11 PROCEDURE — 3079F PR MOST RECENT DIASTOLIC BLOOD PRESSURE 80-89 MM HG: ICD-10-PCS | Mod: CPTII,S$GLB,, | Performed by: PHYSICIAN ASSISTANT

## 2022-07-11 NOTE — PROGRESS NOTES
SUBJECTIVE:    Patient ID: Patrick Wilson is a 62 y.o. male.    Chief Complaint: Follow-up (8wks, went over meds verbally// SW)    This is a 62-year-old male who presents today for 8 week follow-up of cervical radicular symptoms. He reports that the numbness in the RT UE has improved. Says it has gone away completely. Did not end up getting xrays or seeing PT for evaluation. He does still have some trouble with urination. Urinates seated position. May take several minutes to empty but he can. TURP. Failed urolift. He is unable to obtain erection. Would consider talking to urology regarding options if he feels he gets to that point. Recent labs completed and here to review.      Office Visit on 05/16/2022   Component Date Value Ref Range Status    Amphetamines 06/29/2022 NEGATIVE  <500 ng/mL Final    Barbiturates 06/29/2022 NEGATIVE  <300 ng/mL Final    Benzodiazepines 06/29/2022 POSITIVE (A) <100 ng/mL Final    Alphahydroxyalprazolam 06/29/2022 88 (A) <25 ng/mL Final    Alphahydroxymidazolam 06/29/2022 NEGATIVE  <50 ng/mL Final    Alphahydroxytriazolam 06/29/2022 NEGATIVE  <50 ng/mL Final    Aminoclonazepam 06/29/2022 120 (A) <25 ng/mL Final    hydroxyethylflurazepam UR GC/MS 06/29/2022 NEGATIVE  <50 ng/mL Final    Lorazepam 06/29/2022 NEGATIVE  <50 ng/mL Final    Nordiazepam Lvl 06/29/2022 NEGATIVE  <50 ng/mL Final    Oxazepam 06/29/2022 NEGATIVE  <50 ng/mL Final    Temazepam GC/MS Conf 06/29/2022 NEGATIVE  <50 ng/mL Final    Benzodiazepines Comments 06/29/2022    Final    Cocaine Metabolites 06/29/2022 NEGATIVE  <150 ng/mL Final    Methadone 06/29/2022 NEGATIVE  <100 ng/mL Final    Opiates 06/29/2022 POSITIVE (A) <100 ng/mL Final    Codeine 06/29/2022 NEGATIVE  <50 ng/mL Final    Hydrocodone 06/29/2022 2,060 (A) <50 ng/mL Final    Hydromorphone 06/29/2022 207 (A) <50 ng/mL Final    Morphine 06/29/2022 NEGATIVE  <50 ng/mL Final    NORHYDROCODONE 06/29/2022 1,117 (A) <50 ng/mL Final     Opiates Comments 06/29/2022    Final    Oxycodone 06/29/2022 NEGATIVE  <100 ng/mL Final    Phencyclidine 06/29/2022 NEGATIVE  <25 ng/mL Final    Creatinine 06/29/2022 109.9  > or = 20.0 mg/dL Final    pH 06/29/2022 6.0  4.5 - 9.0 Final    Oxidants, Urine (Tox) 06/29/2022 NEGATIVE  <200 mcg/mL Final    Notes and Comments 06/29/2022    Final   Telephone on 05/02/2022   Component Date Value Ref Range Status    WBC 06/29/2022 5.8  3.8 - 10.8 Thousand/uL Final    RBC 06/29/2022 5.65  4.20 - 5.80 Million/uL Final    Hemoglobin 06/29/2022 16.5  13.2 - 17.1 g/dL Final    Hematocrit 06/29/2022 48.3  38.5 - 50.0 % Final    MCV 06/29/2022 85.5  80.0 - 100.0 fL Final    MCH 06/29/2022 29.2  27.0 - 33.0 pg Final    MCHC 06/29/2022 34.2  32.0 - 36.0 g/dL Final    RDW 06/29/2022 12.9  11.0 - 15.0 % Final    Platelets 06/29/2022 269  140 - 400 Thousand/uL Final    MPV 06/29/2022 10.0  7.5 - 12.5 fL Final    Neutrophils, Abs 06/29/2022 3,573  1,500 - 7,800 cells/uL Final    Lymph # 06/29/2022 1,496  850 - 3,900 cells/uL Final    Mono # 06/29/2022 481  200 - 950 cells/uL Final    Eos # 06/29/2022 220  15 - 500 cells/uL Final    Baso # 06/29/2022 29  0 - 200 cells/uL Final    Neutrophils Relative 06/29/2022 61.6  % Final    Lymph % 06/29/2022 25.8  % Final    Mono % 06/29/2022 8.3  % Final    Eosinophil % 06/29/2022 3.8  % Final    Basophil % 06/29/2022 0.5  % Final    Glucose 06/29/2022 117 (A) 65 - 99 mg/dL Final    BUN 06/29/2022 12  7 - 25 mg/dL Final    Creatinine 06/29/2022 0.75  0.70 - 1.25 mg/dL Final    eGFR if non African American 06/29/2022 100  > OR = 60 mL/min/1.73m2 Final    eGFR if  06/29/2022 116  > OR = 60 mL/min/1.73m2 Final    BUN/Creatinine Ratio 06/29/2022 NOT APPLICABLE  6 - 22 (calc) Final    Sodium 06/29/2022 139  135 - 146 mmol/L Final    Potassium 06/29/2022 3.6  3.5 - 5.3 mmol/L Final    Chloride 06/29/2022 101  98 - 110 mmol/L Final    CO2 06/29/2022  27  20 - 32 mmol/L Final    Calcium 06/29/2022 9.5  8.6 - 10.3 mg/dL Final    Total Protein 06/29/2022 7.0  6.1 - 8.1 g/dL Final    Albumin 06/29/2022 4.3  3.6 - 5.1 g/dL Final    Globulin, Total 06/29/2022 2.7  1.9 - 3.7 g/dL (calc) Final    Albumin/Globulin Ratio 06/29/2022 1.6  1.0 - 2.5 (calc) Final    Total Bilirubin 06/29/2022 0.5  0.2 - 1.2 mg/dL Final    Alkaline Phosphatase 06/29/2022 131  35 - 144 U/L Final    AST 06/29/2022 21  10 - 35 U/L Final    ALT 06/29/2022 43  9 - 46 U/L Final    Cholesterol 06/29/2022 188  <200 mg/dL Final    HDL 06/29/2022 56  > OR = 40 mg/dL Final    Triglycerides 06/29/2022 112  <150 mg/dL Final    LDL Cholesterol 06/29/2022 110 (A) mg/dL (calc) Final    HDL/Cholesterol Ratio 06/29/2022 3.4  <5.0 (calc) Final    Non HDL Chol. (LDL+VLDL) 06/29/2022 132 (A) <130 mg/dL (calc) Final    Creatinine, Urine 06/29/2022 105  20 - 320 mg/dL Final    Microalb, Ur 06/29/2022 16.9  See Note: mg/dL Final    Microalb/Creat Ratio 06/29/2022 161 (A) <30 mcg/mg creat Final    TSH w/reflex to FT4 06/29/2022 1.82  0.40 - 4.50 mIU/L Final    Color, UA 06/29/2022 DARK YELLOW  YELLOW Final    Appearance, UA 06/29/2022 CLEAR  CLEAR Final    Specific Gravity, UA 06/29/2022 1.018  1.001 - 1.035 Final    pH, UA 06/29/2022 6.0  5.0 - 8.0 Final    Glucose, UA 06/29/2022 NEGATIVE  NEGATIVE Final    Bilirubin, UA 06/29/2022 NEGATIVE  NEGATIVE Final    Ketones, UA 06/29/2022 NEGATIVE  NEGATIVE Final    Occult Blood UA 06/29/2022 NEGATIVE  NEGATIVE Final    Protein, UA 06/29/2022 1+ (A) NEGATIVE Final    Nitrite, UA 06/29/2022 NEGATIVE  NEGATIVE Final    Leukocytes, UA 06/29/2022 NEGATIVE  NEGATIVE Final    WBC Casts, UA 06/29/2022 NONE SEEN  < OR = 5 /HPF Final    RBC Casts, UA 06/29/2022 NONE SEEN  < OR = 2 /HPF Final    Squam Epithel, UA 06/29/2022 NONE SEEN  < OR = 5 /HPF Final    Bacteria, UA 06/29/2022 NONE SEEN  NONE SEEN /HPF Final    Hyaline Casts, UA 06/29/2022 NONE  SEEN  NONE SEEN /LPF Final    Service Cmt: 06/29/2022    Final    Reflexive Urine Culture 06/29/2022    Final   Office Visit on 02/09/2022   Component Date Value Ref Range Status    POC Rapid COVID 02/09/2022 Negative  Negative Final     Acceptable 02/09/2022 Yes   Final       Past Medical History:   Diagnosis Date    Anticoagulant long-term use     Anxiety     Arthritis     Back pain     Cerebral palsy     Depression     GERD (gastroesophageal reflux disease)     Hypertension     Peptic ulcer of stomach     Seizures     AS A CHILD    Wears glasses     CONTACS     Past Surgical History:   Procedure Laterality Date    ANKLE SURGERY Left     BACK SURGERY      CYSTOSCOPY N/A 7/17/2018    Procedure: CYSTOSCOPY;  Surgeon: Jonathan Jacinto MD;  Location: [facility];  Service: Urology;  Laterality: N/A;    CYSTOSCOPY N/A 4/6/2021    Procedure: CYSTOSCOPY;  Surgeon: Jonathan Jacinto MD;  Location: [facility];  Service: Urology;  Laterality: N/A;    CYSTOSCOPY WITH INSERTION OF MINIMALLY INVASIVE IMPLANT TO ENLARGE PROSTATIC URETHRA N/A 8/2/2018    Procedure: CYSTOSCOPY, WITH INSERTION OF UROLIFT IMPLANT;  Surgeon: Jonathan Jacinto MD;  Location: [facility] OR;  Service: Urology;  Laterality: N/A;    LEG SURGERY Left     SHOULDER SURGERY Right     TRANSRECTAL ULTRASOUND EXAMINATION N/A 7/17/2018    Procedure: ULTRASOUND, TRANSRECTAL;  Surgeon: Jonathan Jacinto MD;  Location: [facility];  Service: Urology;  Laterality: N/A;    TRANSURETHRAL RESECTION OF PROSTATE N/A 5/27/2021    Procedure: TURP (TRANSURETHRAL RESECTION OF PROSTATE);  Surgeon: Jonathan Jacinto MD;  Location: [facility];  Service: Urology;  Laterality: N/A;     Family History   Problem Relation Age of Onset    Melanoma Father        Marital Status:   Alcohol History:  reports current alcohol use.  Tobacco History:  reports that he has been smoking pipe. He has never used smokeless tobacco.  Drug History:  reports no  history of drug use.    Review of patient's allergies indicates:   Allergen Reactions    Tylox [oxycodone-acetaminophen] Nausea And Vomiting       Current Outpatient Medications:     ALPRAZolam (XANAX) 1 MG tablet, Take 1 tablet (1 mg total) by mouth once daily., Disp: 60 tablet, Rfl: 2    amLODIPine (NORVASC) 10 MG tablet, Take 1 tablet (10 mg total) by mouth once daily., Disp: 90 tablet, Rfl: 1    aspirin (ECOTRIN) 81 MG EC tablet, Take 81 mg by mouth once daily., Disp: , Rfl:     atorvastatin (LIPITOR) 40 MG tablet, Take 1 tablet (40 mg total) by mouth once daily., Disp: 90 tablet, Rfl: 1    augmented betamethasone dipropionate (DIPROLENE-AF) 0.05 % cream, Use on AA of hand twice daily., Disp: 50 g, Rfl: 1    cetirizine (ZYRTEC) 10 MG tablet, Take 1 tablet (10 mg total) by mouth once daily., Disp: 90 tablet, Rfl: 1    cyclobenzaprine (FLEXERIL) 5 MG tablet, Take 1 tablet (5 mg total) by mouth 3 (three) times daily as needed., Disp: 90 tablet, Rfl: 2    D3/E/Se/soy isofl/tocoph/lycop (PROSTATE 2.4 ORAL), Take by mouth., Disp: , Rfl:     DULoxetine (CYMBALTA) 60 MG capsule, Take 1 capsule (60 mg total) by mouth once daily., Disp: 90 capsule, Rfl: 1    fluticasone propionate (FLONASE) 50 mcg/actuation nasal spray, 1 spray (50 mcg total) by Each Nostril route as needed., Disp: 16 g, Rfl: 3    gabapentin (NEURONTIN) 300 MG capsule, Take 3 capsules (900 mg total) by mouth 3 (three) times daily., Disp: 810 capsule, Rfl: 1    hydroCHLOROthiazide (HYDRODIURIL) 25 MG tablet, Take 1 tablet (25 mg total) by mouth once daily., Disp: 90 tablet, Rfl: 1    HYDROcodone-acetaminophen (NORCO)  mg per tablet, Take 1 tablet by mouth every 6 (six) hours as needed for Pain., Disp: 90 tablet, Rfl: 0    HYDROcodone-acetaminophen (NORCO)  mg per tablet, Take 1 tablet by mouth every 6 (six) hours as needed for Pain., Disp: 90 tablet, Rfl: 0    HYDROcodone-acetaminophen (NORCO)  mg per tablet, Take 1 tablet  "by mouth every 6 (six) hours as needed for Pain., Disp: 90 tablet, Rfl: 0    KRILL OIL ORAL, Take by mouth., Disp: , Rfl:     multivit-min/folic/vit K/lycop (MEN'S 50 PLUS MULTIVITAMIN ORAL), Take 1 tablet by mouth once daily., Disp: , Rfl:     oxybutynin (DITROPAN-XL) 10 MG 24 hr tablet, Take 1 tablet (10 mg total) by mouth once daily., Disp: 30 tablet, Rfl: 11    pantoprazole (PROTONIX) 40 MG tablet, Take 1 tablet (40 mg total) by mouth once daily., Disp: 90 tablet, Rfl: 1    sildenafiL (VIAGRA) 100 MG tablet, Take 1 tablet (100 mg total) by mouth as needed for Erectile Dysfunction., Disp: 6 tablet, Rfl: 6    triamcinolone acetonide 0.025% (KENALOG) 0.025 % cream, Apply topically 2 (two) times daily., Disp: 80 g, Rfl: 0    tamsulosin (FLOMAX) 0.4 mg Cap, Take 1 capsule (0.4 mg total) by mouth every evening. (Patient not taking: No sig reported), Disp: 30 capsule, Rfl: 11  No current facility-administered medications for this visit.    Facility-Administered Medications Ordered in Other Visits:     ampicillin 2 g in sodium chloride 0.9 % 100 mL IVPB (ready to mix system), 2 g, Intravenous, On Call Procedure, Jonathan Jacinto MD, 2 g at 05/27/21 1234    Review of Systems   Constitutional: Negative for activity change, fatigue, fever and unexpected weight change.   HENT: Negative for congestion.    Respiratory: Negative for apnea, cough, chest tightness and shortness of breath.    Cardiovascular: Negative for chest pain and palpitations.   Gastrointestinal: Negative for abdominal distention and abdominal pain.   Genitourinary: Positive for frequency. Negative for difficulty urinating and dysuria.        Nocturia- 3 times per night   Musculoskeletal: Positive for arthralgias and back pain.   Neurological: Negative for dizziness and weakness.          Objective:      Vitals:    07/11/22 1150   BP: 136/86   Pulse: 96   Weight: 102.5 kg (226 lb)   Height: 5' 7" (1.702 m)     Physical Exam  Vitals and nursing " note reviewed.   Constitutional:       General: He is awake. He is not in acute distress.     Appearance: Normal appearance. He is obese. He is not ill-appearing.   HENT:      Head: Normocephalic and atraumatic.      Right Ear: External ear normal.      Left Ear: External ear normal.      Nose: Nose normal.      Mouth/Throat:      Lips: Pink.      Mouth: Mucous membranes are moist.      Dentition: Normal dentition.      Pharynx: Oropharynx is clear. Uvula midline.      Tonsils: No tonsillar exudate.   Eyes:      General: Vision grossly intact. Gaze aligned appropriately.      Extraocular Movements: Extraocular movements intact.      Conjunctiva/sclera: Conjunctivae normal.   Neck:      Thyroid: No thyroid mass.   Cardiovascular:      Rate and Rhythm: Normal rate and regular rhythm.      Pulses: Normal pulses.           Radial pulses are 2+ on the right side and 2+ on the left side.        Dorsalis pedis pulses are 2+ on the right side and 2+ on the left side.      Heart sounds: Normal heart sounds, S1 normal and S2 normal. No murmur heard.  Pulmonary:      Effort: Pulmonary effort is normal.      Breath sounds: Normal breath sounds and air entry. No wheezing or rales.   Abdominal:      General: Abdomen is flat. Bowel sounds are normal. There is no distension.      Tenderness: There is no abdominal tenderness.   Musculoskeletal:      Right shoulder: No swelling, tenderness or bony tenderness. Normal range of motion.      Left shoulder: No swelling, tenderness or bony tenderness. Normal range of motion.      Cervical back: Spasms and tenderness present. Decreased range of motion.      Right knee: No swelling, erythema, bony tenderness or crepitus. Normal range of motion. No tenderness.      Left knee: No swelling, erythema, bony tenderness or crepitus. Normal range of motion. No tenderness.      Right lower leg: No edema.      Left lower leg: No edema.   Skin:     General: Skin is warm and dry.      Capillary Refill:  Capillary refill takes less than 2 seconds.   Neurological:      General: No focal deficit present.      Mental Status: He is alert and oriented to person, place, and time.   Psychiatric:         Attention and Perception: Attention normal.         Mood and Affect: Mood normal. Mood is not depressed (States that he has been through a lot but is happy where he is now). Affect is blunt (Talks slowly and with little inflection).         Behavior: Behavior is cooperative.           Assessment:       1. Chronic pain syndrome    2. Radiculitis of right cervical region    3. Essential hypertension    4. Encounter for long-term (current) use of other medications         Plan:       Chronic pain syndrome  Comments:  overall, managed pretty well. Labs reviewed. Dr. Chacon manages his pain. UTD on labs and UDS    Radiculitis of right cervical region  Comments:  Overall, doing well. Will let me know if sxs recur and could get images and PT.    Essential hypertension  Comments:  BP at goal. continue as is.    Encounter for long-term (current) use of other medications  Comments:  Labs reviewed for ongoing pt care.      Follow up in about 4 months (around 11/11/2022).        7/11/2022 Yaya Nick PA-C

## 2022-07-18 DIAGNOSIS — G89.4 CHRONIC PAIN SYNDROME: ICD-10-CM

## 2022-07-18 RX ORDER — HYDROCODONE BITARTRATE AND ACETAMINOPHEN 10; 325 MG/1; MG/1
1 TABLET ORAL EVERY 6 HOURS PRN
Qty: 90 TABLET | Refills: 0 | Status: SHIPPED | OUTPATIENT
Start: 2022-09-16 | End: 2022-10-13 | Stop reason: SDUPTHER

## 2022-07-18 RX ORDER — HYDROCODONE BITARTRATE AND ACETAMINOPHEN 10; 325 MG/1; MG/1
1 TABLET ORAL EVERY 6 HOURS PRN
Qty: 90 TABLET | Refills: 0 | Status: SHIPPED | OUTPATIENT
Start: 2022-07-18 | End: 2022-12-14 | Stop reason: SDUPTHER

## 2022-07-18 RX ORDER — HYDROCODONE BITARTRATE AND ACETAMINOPHEN 10; 325 MG/1; MG/1
1 TABLET ORAL EVERY 6 HOURS PRN
Qty: 90 TABLET | Refills: 0 | Status: SHIPPED | OUTPATIENT
Start: 2022-08-16 | End: 2023-11-13 | Stop reason: SDUPTHER

## 2022-07-18 NOTE — TELEPHONE ENCOUNTER
Pt is needing his [place] refilled. Last office visit  07/11/2022. Next office visit 11/11/2022. Last dispense 06/17/2022. UDS done on 06/29/2022.

## 2022-08-15 DIAGNOSIS — G89.4 CHRONIC PAIN SYNDROME: ICD-10-CM

## 2022-08-15 RX ORDER — HYDROCODONE BITARTRATE AND ACETAMINOPHEN 10; 325 MG/1; MG/1
1 TABLET ORAL EVERY 6 HOURS PRN
Qty: 90 TABLET | Refills: 0 | Status: CANCELLED | OUTPATIENT
Start: 2022-08-15

## 2022-09-14 DIAGNOSIS — G89.4 CHRONIC PAIN SYNDROME: ICD-10-CM

## 2022-09-14 RX ORDER — HYDROCODONE BITARTRATE AND ACETAMINOPHEN 10; 325 MG/1; MG/1
1 TABLET ORAL EVERY 6 HOURS PRN
Qty: 90 TABLET | Refills: 0 | Status: CANCELLED | OUTPATIENT
Start: 2022-09-14

## 2022-10-13 DIAGNOSIS — G89.4 CHRONIC PAIN SYNDROME: ICD-10-CM

## 2022-10-13 RX ORDER — HYDROCODONE BITARTRATE AND ACETAMINOPHEN 10; 325 MG/1; MG/1
1 TABLET ORAL EVERY 6 HOURS PRN
Qty: 90 TABLET | Refills: 0 | Status: SHIPPED | OUTPATIENT
Start: 2022-10-13 | End: 2022-11-11 | Stop reason: SDUPTHER

## 2022-11-11 ENCOUNTER — OFFICE VISIT (OUTPATIENT)
Dept: FAMILY MEDICINE | Facility: CLINIC | Age: 62
End: 2022-11-11
Payer: MEDICARE

## 2022-11-11 VITALS
HEART RATE: 88 BPM | DIASTOLIC BLOOD PRESSURE: 84 MMHG | HEIGHT: 67 IN | WEIGHT: 227 LBS | BODY MASS INDEX: 35.63 KG/M2 | SYSTOLIC BLOOD PRESSURE: 136 MMHG

## 2022-11-11 DIAGNOSIS — E78.5 HYPERLIPIDEMIA, UNSPECIFIED HYPERLIPIDEMIA TYPE: ICD-10-CM

## 2022-11-11 DIAGNOSIS — G89.4 CHRONIC PAIN SYNDROME: ICD-10-CM

## 2022-11-11 DIAGNOSIS — M54.12 RADICULITIS OF RIGHT CERVICAL REGION: ICD-10-CM

## 2022-11-11 DIAGNOSIS — M94.0 COSTOCHONDRITIS: ICD-10-CM

## 2022-11-11 DIAGNOSIS — I10 ESSENTIAL HYPERTENSION: Primary | ICD-10-CM

## 2022-11-11 DIAGNOSIS — F41.9 ANXIETY DISORDER, UNSPECIFIED TYPE: ICD-10-CM

## 2022-11-11 PROCEDURE — 3008F BODY MASS INDEX DOCD: CPT | Mod: CPTII,S$GLB,, | Performed by: PHYSICIAN ASSISTANT

## 2022-11-11 PROCEDURE — 3066F NEPHROPATHY DOC TX: CPT | Mod: CPTII,S$GLB,, | Performed by: PHYSICIAN ASSISTANT

## 2022-11-11 PROCEDURE — 3075F PR MOST RECENT SYSTOLIC BLOOD PRESS GE 130-139MM HG: ICD-10-PCS | Mod: CPTII,S$GLB,, | Performed by: PHYSICIAN ASSISTANT

## 2022-11-11 PROCEDURE — 1159F PR MEDICATION LIST DOCUMENTED IN MEDICAL RECORD: ICD-10-PCS | Mod: CPTII,S$GLB,, | Performed by: PHYSICIAN ASSISTANT

## 2022-11-11 PROCEDURE — 3060F POS MICROALBUMINURIA REV: CPT | Mod: CPTII,S$GLB,, | Performed by: PHYSICIAN ASSISTANT

## 2022-11-11 PROCEDURE — 3066F PR DOCUMENTATION OF TREATMENT FOR NEPHROPATHY: ICD-10-PCS | Mod: CPTII,S$GLB,, | Performed by: PHYSICIAN ASSISTANT

## 2022-11-11 PROCEDURE — 99214 OFFICE O/P EST MOD 30 MIN: CPT | Mod: S$GLB,,, | Performed by: PHYSICIAN ASSISTANT

## 2022-11-11 PROCEDURE — 3079F PR MOST RECENT DIASTOLIC BLOOD PRESSURE 80-89 MM HG: ICD-10-PCS | Mod: CPTII,S$GLB,, | Performed by: PHYSICIAN ASSISTANT

## 2022-11-11 PROCEDURE — 1159F MED LIST DOCD IN RCRD: CPT | Mod: CPTII,S$GLB,, | Performed by: PHYSICIAN ASSISTANT

## 2022-11-11 PROCEDURE — 99214 PR OFFICE/OUTPT VISIT, EST, LEVL IV, 30-39 MIN: ICD-10-PCS | Mod: S$GLB,,, | Performed by: PHYSICIAN ASSISTANT

## 2022-11-11 PROCEDURE — 3060F PR POS MICROALBUMINURIA RESULT DOCUMENTED/REVIEW: ICD-10-PCS | Mod: CPTII,S$GLB,, | Performed by: PHYSICIAN ASSISTANT

## 2022-11-11 PROCEDURE — 3079F DIAST BP 80-89 MM HG: CPT | Mod: CPTII,S$GLB,, | Performed by: PHYSICIAN ASSISTANT

## 2022-11-11 PROCEDURE — 3008F PR BODY MASS INDEX (BMI) DOCUMENTED: ICD-10-PCS | Mod: CPTII,S$GLB,, | Performed by: PHYSICIAN ASSISTANT

## 2022-11-11 PROCEDURE — 3075F SYST BP GE 130 - 139MM HG: CPT | Mod: CPTII,S$GLB,, | Performed by: PHYSICIAN ASSISTANT

## 2022-11-11 RX ORDER — HYDROCODONE BITARTRATE AND ACETAMINOPHEN 10; 325 MG/1; MG/1
1 TABLET ORAL EVERY 6 HOURS PRN
Qty: 90 TABLET | Refills: 0 | Status: SHIPPED | OUTPATIENT
Start: 2022-11-12 | End: 2022-12-12
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
## 2022-11-29 DIAGNOSIS — N52.9 ERECTILE DYSFUNCTION, UNSPECIFIED ERECTILE DYSFUNCTION TYPE: ICD-10-CM

## 2022-11-29 RX ORDER — SILDENAFIL 100 MG/1
100 TABLET, FILM COATED ORAL
Qty: 6 TABLET | Refills: 6 | Status: SHIPPED | OUTPATIENT
Start: 2022-11-29 | End: 2023-11-17

## 2022-11-29 NOTE — TELEPHONE ENCOUNTER
----- Message from Ligia Carrillo sent at 11/29/2022  9:12 AM CST -----  Patient  wife called and stated that the patient need a refill of his sildenafil called into [facility] on [address] if any questions please give her a call at [phone]

## 2022-11-30 ENCOUNTER — HOSPITAL ENCOUNTER (OUTPATIENT)
Dept: RADIOLOGY | Facility: HOSPITAL | Age: 62
Discharge: HOME | End: 2022-11-30
Attending: PHYSICIAN ASSISTANT
Payer: MEDICARE

## 2022-11-30 DIAGNOSIS — M94.0 COSTOCHONDRITIS: ICD-10-CM

## 2022-11-30 DIAGNOSIS — M54.12 RADICULITIS OF RIGHT CERVICAL REGION: ICD-10-CM

## 2022-11-30 PROCEDURE — 71100 X-RAY EXAM RIBS UNI 2 VIEWS: CPT | Mod: TC,PO,RT

## 2022-11-30 PROCEDURE — 72050 X-RAY EXAM NECK SPINE 4/5VWS: CPT | Mod: TC,PO

## 2022-12-14 DIAGNOSIS — G89.4 CHRONIC PAIN SYNDROME: ICD-10-CM

## 2022-12-14 RX ORDER — HYDROCODONE BITARTRATE AND ACETAMINOPHEN 10; 325 MG/1; MG/1
1 TABLET ORAL EVERY 6 HOURS PRN
Qty: 90 TABLET | Refills: 0 | Status: SHIPPED | OUTPATIENT
Start: 2022-12-14 | End: 2023-01-13 | Stop reason: SDUPTHER

## 2022-12-14 RX ORDER — HYDROCODONE BITARTRATE AND ACETAMINOPHEN 10; 325 MG/1; MG/1
1 TABLET ORAL EVERY 6 HOURS PRN
Qty: 90 TABLET | Refills: 0 | Status: SHIPPED | OUTPATIENT
Start: 2023-01-13

## 2022-12-14 RX ORDER — HYDROCODONE BITARTRATE AND ACETAMINOPHEN 10; 325 MG/1; MG/1
1 TABLET ORAL EVERY 6 HOURS PRN
Qty: 90 TABLET | Refills: 0 | Status: SHIPPED | OUTPATIENT
Start: 2023-02-12

## 2023-01-09 ENCOUNTER — TELEPHONE (OUTPATIENT)
Dept: FAMILY MEDICINE | Facility: CLINIC | Age: 63
End: 2023-01-09

## 2023-01-09 NOTE — TELEPHONE ENCOUNTER
Spoke to pt and let him know per Osmani below. Pt was offered appointment with [facility] on 1/10 at 3pm and pt declined asking for next available early appointment. Pt was offered 1/11 at 9:30 am with [facility] and pt accepted. Pt is scheduled.

## 2023-01-09 NOTE — TELEPHONE ENCOUNTER
----- Message from Ligia Carrillo sent at 1/9/2023  9:08 AM CST -----  VM 01/09/23 @ 8:42 AM :patient called and stated that he got into either poison ivy or poison oak and would like to have something called in for it (no pharmacy information was left ) please give him a call at [phone]

## 2023-01-09 NOTE — TELEPHONE ENCOUNTER
Pt states on his right hand near wrist has a poison ivy rash. Has tried calamine lotion, triamcinolone cream, benadryl, and cream. States rash is not worsening but it is not getting any better. Pt requesting something sent to pharmacy. Pharmacy is [facility].

## 2023-01-09 NOTE — TELEPHONE ENCOUNTER
He has done everything that I would recommend thus far for his symptoms. Would need to be evaluated if not responding. Here or urgent care

## 2023-01-11 ENCOUNTER — OFFICE VISIT (OUTPATIENT)
Dept: FAMILY MEDICINE | Facility: CLINIC | Age: 63
End: 2023-01-11
Payer: MEDICARE

## 2023-01-11 VITALS
DIASTOLIC BLOOD PRESSURE: 92 MMHG | WEIGHT: 232.38 LBS | BODY MASS INDEX: 34.42 KG/M2 | TEMPERATURE: 98 F | HEART RATE: 94 BPM | SYSTOLIC BLOOD PRESSURE: 132 MMHG | OXYGEN SATURATION: 96 % | HEIGHT: 69 IN

## 2023-01-11 DIAGNOSIS — F41.9 ANXIETY DISORDER, UNSPECIFIED TYPE: ICD-10-CM

## 2023-01-11 DIAGNOSIS — I10 ESSENTIAL HYPERTENSION: ICD-10-CM

## 2023-01-11 DIAGNOSIS — G89.4 CHRONIC PAIN SYNDROME: ICD-10-CM

## 2023-01-11 DIAGNOSIS — L23.7 POISON IVY DERMATITIS: Primary | ICD-10-CM

## 2023-01-11 PROCEDURE — 3080F PR MOST RECENT DIASTOLIC BLOOD PRESSURE >= 90 MM HG: ICD-10-PCS | Mod: CPTII,S$GLB,, | Performed by: PHYSICIAN ASSISTANT

## 2023-01-11 PROCEDURE — 3008F BODY MASS INDEX DOCD: CPT | Mod: CPTII,S$GLB,, | Performed by: PHYSICIAN ASSISTANT

## 2023-01-11 PROCEDURE — 99214 OFFICE O/P EST MOD 30 MIN: CPT | Mod: 25,S$GLB,, | Performed by: PHYSICIAN ASSISTANT

## 2023-01-11 PROCEDURE — 99214 PR OFFICE/OUTPT VISIT, EST, LEVL IV, 30-39 MIN: ICD-10-PCS | Mod: 25,S$GLB,, | Performed by: PHYSICIAN ASSISTANT

## 2023-01-11 PROCEDURE — 1160F RVW MEDS BY RX/DR IN RCRD: CPT | Mod: CPTII,S$GLB,, | Performed by: PHYSICIAN ASSISTANT

## 2023-01-11 PROCEDURE — 3075F PR MOST RECENT SYSTOLIC BLOOD PRESS GE 130-139MM HG: ICD-10-PCS | Mod: CPTII,S$GLB,, | Performed by: PHYSICIAN ASSISTANT

## 2023-01-11 PROCEDURE — 1159F MED LIST DOCD IN RCRD: CPT | Mod: CPTII,S$GLB,, | Performed by: PHYSICIAN ASSISTANT

## 2023-01-11 PROCEDURE — 1159F PR MEDICATION LIST DOCUMENTED IN MEDICAL RECORD: ICD-10-PCS | Mod: CPTII,S$GLB,, | Performed by: PHYSICIAN ASSISTANT

## 2023-01-11 PROCEDURE — 1160F PR REVIEW ALL MEDS BY PRESCRIBER/CLIN PHARMACIST DOCUMENTED: ICD-10-PCS | Mod: CPTII,S$GLB,, | Performed by: PHYSICIAN ASSISTANT

## 2023-01-11 PROCEDURE — 3008F PR BODY MASS INDEX (BMI) DOCUMENTED: ICD-10-PCS | Mod: CPTII,S$GLB,, | Performed by: PHYSICIAN ASSISTANT

## 2023-01-11 PROCEDURE — 3080F DIAST BP >= 90 MM HG: CPT | Mod: CPTII,S$GLB,, | Performed by: PHYSICIAN ASSISTANT

## 2023-01-11 PROCEDURE — 96372 PR INJECTION,THERAP/PROPH/DIAG2ST, IM OR SUBCUT: ICD-10-PCS | Mod: S$GLB,,, | Performed by: PHYSICIAN ASSISTANT

## 2023-01-11 PROCEDURE — 3075F SYST BP GE 130 - 139MM HG: CPT | Mod: CPTII,S$GLB,, | Performed by: PHYSICIAN ASSISTANT

## 2023-01-11 PROCEDURE — 96372 THER/PROPH/DIAG INJ SC/IM: CPT | Mod: S$GLB,,, | Performed by: PHYSICIAN ASSISTANT

## 2023-01-11 RX ORDER — HYDROXYZINE PAMOATE 25 MG/1
25 CAPSULE ORAL EVERY 8 HOURS PRN
Qty: 30 CAPSULE | Refills: 0 | Status: SHIPPED | OUTPATIENT
Start: 2023-01-11 | End: 2023-01-21

## 2023-01-11 RX ORDER — LISINOPRIL 10 MG/1
10 TABLET ORAL DAILY
Qty: 90 TABLET | Refills: 1 | Status: SHIPPED | OUTPATIENT
Start: 2023-01-11 | End: 2023-07-03 | Stop reason: SDUPTHER

## 2023-01-11 RX ORDER — DEXAMETHASONE SODIUM PHOSPHATE 4 MG/ML
8 INJECTION, SOLUTION INTRA-ARTICULAR; INTRALESIONAL; INTRAMUSCULAR; INTRAVENOUS; SOFT TISSUE
Status: COMPLETED | OUTPATIENT
Start: 2023-01-11 | End: 2023-01-11

## 2023-01-11 RX ORDER — CLOBETASOL PROPIONATE 0.5 MG/G
CREAM TOPICAL 2 TIMES DAILY
Qty: 45 G | Refills: 0 | Status: SHIPPED | OUTPATIENT
Start: 2023-01-11 | End: 2024-02-23 | Stop reason: SDUPTHER

## 2023-01-11 RX ADMIN — DEXAMETHASONE SODIUM PHOSPHATE 8 MG: 4 INJECTION, SOLUTION INTRA-ARTICULAR; INTRALESIONAL; INTRAMUSCULAR; INTRAVENOUS; SOFT TISSUE at 11:01

## 2023-01-11 NOTE — PROGRESS NOTES
"  SUBJECTIVE:    Patient ID: Patrick Wilson is a 62 y.o. male.    Chief Complaint: rash right arm (No bottles/ rash with blisters on right fingers hand and arm, red scaly itchy hot/ applied Kenolog for 1.5 weeks/ applies ace bandage at night/ flu vaccine at [facility] 11/2022/ discuss PNA vaccine/ mp)    Pt is a 61 y/o male who w/ a PMHx of HTN, GERD, Depression, cerebral palsy, BPH, and chronic back pain who presents today for sick visit with a CC of "a rash." 2 weeks ago, he was doing yard work and a few days later he experienced a gradual onset of a red, itchy/burning, vesicular rash located on the juarez aspect of the right wrist. He continue to scratch the rash, and it begin to extend to the dorsal aspect of the right wrist and in between the 3rd and 4th, as well as 4th and 5th, web spaces of the right hand phalanges.  The rash is constantly present and occasionally wakes him up at night secondary to the itchiness.  He has been applying colamine lotion, but has not experience significant relief.  He denies any insect bites or puncture wounds to the region.  Of note, BP is elevated in office today, despite being compliant with his antihypertensive regimen.  He denies any CP, palpitations, dizziness, headaches, or lightheadedness.    No visits with results within 6 Month(s) from this visit.   Latest known visit with results is:   Office Visit on 05/16/2022   Component Date Value Ref Range Status    Amphetamines 06/29/2022 NEGATIVE  <500 ng/mL Final    Barbiturates 06/29/2022 NEGATIVE  <300 ng/mL Final    Benzodiazepines 06/29/2022 POSITIVE (A)  <100 ng/mL Final    Alphahydroxyalprazolam 06/29/2022 88 (H)  <25 ng/mL Final    Alphahydroxymidazolam 06/29/2022 NEGATIVE  <50 ng/mL Final    Alphahydroxytriazolam 06/29/2022 NEGATIVE  <50 ng/mL Final    Aminoclonazepam 06/29/2022 120 (H)  <25 ng/mL Final    hydroxyethylflurazepam UR GC/MS 06/29/2022 NEGATIVE  <50 ng/mL Final    Lorazepam 06/29/2022 NEGATIVE "  <50 ng/mL Final    Nordiazepam Lvl 06/29/2022 NEGATIVE  <50 ng/mL Final    Oxazepam 06/29/2022 NEGATIVE  <50 ng/mL Final    Temazepam GC/MS Conf 06/29/2022 NEGATIVE  <50 ng/mL Final    Benzodiazepines Comments 06/29/2022    Final    Cocaine Metabolites 06/29/2022 NEGATIVE  <150 ng/mL Final    Methadone 06/29/2022 NEGATIVE  <100 ng/mL Final    Opiates 06/29/2022 POSITIVE (A)  <100 ng/mL Final    Codeine 06/29/2022 NEGATIVE  <50 ng/mL Final    Hydrocodone 06/29/2022 2,060 (H)  <50 ng/mL Final    Hydromorphone 06/29/2022 207 (H)  <50 ng/mL Final    Morphine 06/29/2022 NEGATIVE  <50 ng/mL Final    NORHYDROCODONE 06/29/2022 1,117 (H)  <50 ng/mL Final    Opiates Comments 06/29/2022    Final    Oxycodone 06/29/2022 NEGATIVE  <100 ng/mL Final    Phencyclidine 06/29/2022 NEGATIVE  <25 ng/mL Final    Creatinine 06/29/2022 109.9  > or = 20.0 mg/dL Final    pH 06/29/2022 6.0  4.5 - 9.0 Final    Oxidants, Urine (Tox) 06/29/2022 NEGATIVE  <200 mcg/mL Final    Notes and Comments 06/29/2022    Final       Past Medical History:   Diagnosis Date    Anticoagulant long-term use     Anxiety     Arthritis     Back pain     Cerebral palsy     Depression     GERD (gastroesophageal reflux disease)     Hypertension     Peptic ulcer of stomach     Seizures     AS A CHILD    Wears glasses     CONTACS     Past Surgical History:   Procedure Laterality Date    ANKLE SURGERY Left     BACK SURGERY      CYSTOSCOPY N/A 7/17/2018    Procedure: CYSTOSCOPY;  Surgeon: Jonathan Jacinto MD;  Location: [facility] OR;  Service: Urology;  Laterality: N/A;    CYSTOSCOPY N/A 4/6/2021    Procedure: CYSTOSCOPY;  Surgeon: Jonathan Jacinto MD;  Location: [facility] OR;  Service: Urology;  Laterality: N/A;    CYSTOSCOPY WITH INSERTION OF MINIMALLY INVASIVE IMPLANT TO ENLARGE PROSTATIC URETHRA N/A 8/2/2018    Procedure: CYSTOSCOPY, WITH INSERTION OF UROLIFT IMPLANT;  Surgeon: Jonathan Jacinto MD;  Location: [facility] OR;  Service: Urology;  Laterality: N/A;    LEG SURGERY Left      SHOULDER SURGERY Right     TRANSRECTAL ULTRASOUND EXAMINATION N/A 7/17/2018    Procedure: ULTRASOUND, TRANSRECTAL;  Surgeon: Jonathan Jacinto MD;  Location: [facility] OR;  Service: Urology;  Laterality: N/A;    TRANSURETHRAL RESECTION OF PROSTATE N/A 5/27/2021    Procedure: TURP (TRANSURETHRAL RESECTION OF PROSTATE);  Surgeon: Jonathan Jacinto MD;  Location: [facility] OR;  Service: Urology;  Laterality: N/A;     Family History   Problem Relation Age of Onset    Melanoma Father        Marital Status:   Alcohol History:  reports current alcohol use.  Tobacco History:  reports that he has been smoking pipe. He has never used smokeless tobacco.  Drug History:  reports no history of drug use.    Health Maintenance Topics with due status: Not Due       Topic Last Completion Date    Lipid Panel 06/29/2022     Immunization History   Administered Date(s) Administered    COVID-19 Vaccine 11/29/2022    COVID-19, MRNA, LN-S, PF (MODERNA FULL 0.5 ML DOSE) 03/31/2021, 04/30/2021, 11/30/2021    Influenza 09/22/2014, 09/10/2015    Influenza (FLUBLOK) - Quadrivalent - Recombinant - PF *Preferred* (egg allergy) 09/10/2020, 11/15/2021    Influenza - Quadrivalent - PF *Preferred* (6 months and older) 09/19/2016, 09/12/2017, 11/08/2019, 11/29/2022    Influenza - Trivalent - Recombinant - PF 10/08/2018    Influenza A (H1N1) 2009 Monovalent - IM - PF 11/18/2009    Pneumococcal Polysaccharide - 23 Valent 09/10/2015       Review of patient's allergies indicates:   Allergen Reactions    Tylox [oxycodone-acetaminophen] Nausea And Vomiting       Current Outpatient Medications:     ALPRAZolam (XANAX) 1 MG tablet, Take 1 tablet (1 mg total) by mouth once daily., Disp: 30 tablet, Rfl: 2    amLODIPine (NORVASC) 10 MG tablet, Take 1 tablet (10 mg total) by mouth once daily., Disp: 90 tablet, Rfl: 1    aspirin (ECOTRIN) 81 MG EC tablet, Take 81 mg by mouth once daily., Disp: , Rfl:     atorvastatin (LIPITOR) 40 MG tablet, Take 1 tablet (40 mg  total) by mouth once daily., Disp: 90 tablet, Rfl: 1    augmented betamethasone dipropionate (DIPROLENE-AF) 0.05 % cream, Use on AA of hand twice daily., Disp: 50 g, Rfl: 1    cetirizine (ZYRTEC) 10 MG tablet, Take 1 tablet (10 mg total) by mouth once daily., Disp: 90 tablet, Rfl: 1    cyclobenzaprine (FLEXERIL) 5 MG tablet, Take 1 tablet (5 mg total) by mouth 3 (three) times daily as needed., Disp: 90 tablet, Rfl: 2    D3/E/Se/soy isofl/tocoph/lycop (PROSTATE 2.4 ORAL), Take by mouth., Disp: , Rfl:     DULoxetine (CYMBALTA) 60 MG capsule, Take 1 capsule (60 mg total) by mouth once daily., Disp: 90 capsule, Rfl: 1    fluticasone propionate (FLONASE) 50 mcg/actuation nasal spray, 1 spray (50 mcg total) by Each Nostril route as needed., Disp: 16 g, Rfl: 3    gabapentin (NEURONTIN) 300 MG capsule, Take 3 capsules (900 mg total) by mouth 3 (three) times daily., Disp: 810 capsule, Rfl: 1    hydroCHLOROthiazide (HYDRODIURIL) 25 MG tablet, Take 1 tablet (25 mg total) by mouth once daily., Disp: 90 tablet, Rfl: 1    HYDROcodone-acetaminophen (NORCO)  mg per tablet, Take 1 tablet by mouth every 6 (six) hours as needed for Pain., Disp: 90 tablet, Rfl: 0    KRILL OIL ORAL, Take by mouth., Disp: , Rfl:     multivit-min/folic/vit K/lycop (MEN'S 50 PLUS MULTIVITAMIN ORAL), Take 1 tablet by mouth once daily., Disp: , Rfl:     oxybutynin (DITROPAN-XL) 10 MG 24 hr tablet, Take 1 tablet (10 mg total) by mouth once daily., Disp: 30 tablet, Rfl: 11    pantoprazole (PROTONIX) 40 MG tablet, Take 1 tablet (40 mg total) by mouth once daily., Disp: 90 tablet, Rfl: 1    sildenafiL (VIAGRA) 100 MG tablet, Take 1 tablet (100 mg total) by mouth as needed for Erectile Dysfunction., Disp: 6 tablet, Rfl: 6    tamsulosin (FLOMAX) 0.4 mg Cap, Take 1 capsule (0.4 mg total) by mouth every evening., Disp: 30 capsule, Rfl: 11    triamcinolone acetonide 0.025% (KENALOG) 0.025 % cream, Apply topically 2 (two) times daily., Disp: 80 g, Rfl: 0     "clobetasoL (TEMOVATE) 0.05 % cream, Apply topically 2 (two) times daily. for 7 days, Disp: 45 g, Rfl: 0    HYDROcodone-acetaminophen (NORCO)  mg per tablet, Take 1 tablet by mouth every 6 (six) hours as needed for Pain., Disp: 90 tablet, Rfl: 0    HYDROcodone-acetaminophen (NORCO)  mg per tablet, Take 1 tablet by mouth every 6 (six) hours as needed for Pain., Disp: 90 tablet, Rfl: 0    HYDROcodone-acetaminophen (NORCO)  mg per tablet, Take 1 tablet by mouth every 6 (six) hours as needed for Pain., Disp: 90 tablet, Rfl: 0    [START ON 1/13/2023] HYDROcodone-acetaminophen (NORCO)  mg per tablet, Take 1 tablet by mouth every 6 (six) hours as needed for Pain., Disp: 90 tablet, Rfl: 0    [START ON 2/12/2023] HYDROcodone-acetaminophen (NORCO)  mg per tablet, Take 1 tablet by mouth every 6 (six) hours as needed for Pain., Disp: 90 tablet, Rfl: 0    hydrOXYzine pamoate (VISTARIL) 25 MG Cap, Take 1 capsule (25 mg total) by mouth every 8 (eight) hours as needed., Disp: 30 capsule, Rfl: 0    lisinopriL 10 MG tablet, Take 1 tablet (10 mg total) by mouth once daily., Disp: 90 tablet, Rfl: 1    Current Facility-Administered Medications:     dexAMETHasone injection 8 mg, 8 mg, Intramuscular, 1 time in Clinic/HOD, ANNA Grant    Facility-Administered Medications Ordered in Other Visits:     ampicillin 2 g in sodium chloride 0.9 % 100 mL IVPB (ready to mix system), 2 g, Intravenous, On Call Procedure, Jonathan Jacinto MD, 2 g at 05/27/21 1234    Review of Systems   Constitutional:  Negative for activity change, chills, fatigue and fever.   Respiratory:  Negative for cough, shortness of breath and wheezing.    Cardiovascular:  Negative for chest pain, palpitations and leg swelling.   Gastrointestinal:  Negative for abdominal pain, constipation, diarrhea, nausea and vomiting.   Genitourinary: Negative.    Musculoskeletal:  Negative for arthralgias and myalgias.        "Swelling in the right wrist " "and hand where the rash is."   Skin:  Positive for rash. Negative for wound.   Neurological:  Negative for dizziness, syncope, light-headedness and headaches.        Objective:      Vitals:    01/11/23 0930 01/11/23 0946 01/11/23 1012   BP: (!) 160/100 (!) 140/90 (!) 132/92   Pulse: 94     Temp: 98 °F (36.7 °C)     SpO2: 96%     Weight: 105.4 kg (232 lb 6.4 oz)     Height: 5' 9" (1.753 m)       Physical Exam  Vitals reviewed.   Constitutional:       General: He is not in acute distress.     Appearance: Normal appearance. He is obese. He is not ill-appearing or toxic-appearing.   HENT:      Head: Normocephalic and atraumatic.      Right Ear: External ear normal.      Left Ear: External ear normal.      Nose: Nose normal.      Mouth/Throat:      Mouth: Mucous membranes are moist.      Pharynx: Oropharynx is clear.   Eyes:      General: No scleral icterus.     Conjunctiva/sclera: Conjunctivae normal.   Cardiovascular:      Rate and Rhythm: Normal rate and regular rhythm.      Pulses: Normal pulses.           Radial pulses are 2+ on the right side and 2+ on the left side.        Posterior tibial pulses are 2+ on the right side and 2+ on the left side.      Heart sounds: Normal heart sounds. No murmur heard.    No gallop.   Pulmonary:      Effort: Pulmonary effort is normal.      Breath sounds: Normal breath sounds. No wheezing, rhonchi or rales.   Abdominal:      Palpations: Abdomen is soft.      Tenderness: There is no abdominal tenderness. There is no guarding.   Musculoskeletal:      Cervical back: Normal range of motion.      Right lower leg: No edema.      Left lower leg: No edema.      Comments: 5/5  strength noted bilaterally.   Skin:     General: Skin is warm and dry.      Findings: Erythema and rash present. Rash is crusting and vesicular.             Comments: Swelling of the right hand and right wrist noted - neurovascularly intact.  See images.   Neurological:      Mental Status: He is alert. Mental " status is at baseline.      Cranial Nerves: Cranial nerves 2-12 are intact. No cranial nerve deficit.      Sensory: Sensation is intact. No sensory deficit.      Motor: No weakness.   Psychiatric:         Behavior: Behavior is cooperative.               Assessment:       1. Poison ivy dermatitis    2. Essential hypertension    3. Chronic pain syndrome    4. Anxiety disorder, unspecified type           Plan:       Poison ivy dermatitis  Clean area with soap and water alone, avoiding hydrogen peroxide and alcohol for hygiene.    Decadron 8 mg IM administered today.    Start Clobetasol 0.05% cream b.i.d x 7 days.  Start p.r.n. Vistaril 25 mg t.i.d for itch relief.   -     dexAMETHasone injection 8 mg  -     clobetasoL (TEMOVATE) 0.05 % cream; Apply topically 2 (two) times daily. for 7 days  Dispense: 45 g; Refill: 0  -     hydrOXYzine pamoate (VISTARIL) 25 MG Cap; Take 1 capsule (25 mg total) by mouth every 8 (eight) hours as needed.  Dispense: 30 capsule; Refill: 0    Essential hypertension  Start Lisinopril 10 mg q.d., in conjunction to current antihypertensive regimen.    Begin monitoring BP daily and return for a BP check/nurse visit in 2 weeks.  -     lisinopriL 10 MG tablet; Take 1 tablet (10 mg total) by mouth once daily.  Dispense: 90 tablet; Refill: 1    Chronic pain syndrome    Anxiety disorder, unspecified type      Follow up in about 2 weeks (around 1/25/2023), or if symptoms worsen or fail to improve, for BP Check-Up w/Nurse, as needed for Poison Ivy.        1/11/2023 Alejandro Sequeira PA-C

## 2023-01-25 ENCOUNTER — CLINICAL SUPPORT (OUTPATIENT)
Dept: FAMILY MEDICINE | Facility: CLINIC | Age: 63
End: 2023-01-25
Payer: MEDICARE

## 2023-01-25 VITALS — SYSTOLIC BLOOD PRESSURE: 136 MMHG | DIASTOLIC BLOOD PRESSURE: 76 MMHG

## 2023-01-25 NOTE — PROGRESS NOTES
Pat here for BP check brought logs, pt takes amlodipine 10 mg, lisinopril 10 mg, and HCTZ 25 mg daily.    Per Dr. Chacon continue current regimen and keep follow up appt, call if Bp changes.

## 2023-02-08 ENCOUNTER — TELEPHONE (OUTPATIENT)
Dept: FAMILY MEDICINE | Facility: CLINIC | Age: 63
End: 2023-02-08

## 2023-02-08 DIAGNOSIS — M54.9 BACK PAIN, UNSPECIFIED BACK LOCATION, UNSPECIFIED BACK PAIN LATERALITY, UNSPECIFIED CHRONICITY: Primary | ICD-10-CM

## 2023-02-08 NOTE — TELEPHONE ENCOUNTER
----- Message from Grisel Ybarra MA sent at 2/8/2023 11:33 AM CST -----    ----- Message -----  From: Phyllis Ballard  Sent: 2/8/2023  11:29 AM CST  To: Jonathan Chacon Staff    - pt would like a referral to an orthopedic surgeon for his pain in back   [phone]

## 2023-02-17 ENCOUNTER — PATIENT MESSAGE (OUTPATIENT)
Dept: FAMILY MEDICINE | Facility: CLINIC | Age: 63
End: 2023-02-17

## 2023-02-18 ENCOUNTER — PATIENT MESSAGE (OUTPATIENT)
Dept: FAMILY MEDICINE | Facility: CLINIC | Age: 63
End: 2023-02-18

## 2023-02-27 ENCOUNTER — OFFICE VISIT (OUTPATIENT)
Dept: ORTHOPEDICS | Facility: CLINIC | Age: 63
End: 2023-02-27
Payer: MEDICARE

## 2023-02-27 DIAGNOSIS — Z98.1 HISTORY OF LUMBAR FUSION: ICD-10-CM

## 2023-02-27 DIAGNOSIS — M40.50 LORDOSIS: ICD-10-CM

## 2023-02-27 DIAGNOSIS — M48.061 SPINAL STENOSIS OF LUMBAR REGION WITHOUT NEUROGENIC CLAUDICATION: ICD-10-CM

## 2023-02-27 DIAGNOSIS — G80.9 INFANTILE CEREBRAL PALSY: ICD-10-CM

## 2023-02-27 DIAGNOSIS — M47.814 THORACIC SPONDYLOSIS: Primary | ICD-10-CM

## 2023-02-27 PROCEDURE — 1159F PR MEDICATION LIST DOCUMENTED IN MEDICAL RECORD: ICD-10-PCS | Mod: CPTII,S$GLB,, | Performed by: ORTHOPAEDIC SURGERY

## 2023-02-27 PROCEDURE — 4010F PR ACE/ARB THEARPY RXD/TAKEN: ICD-10-PCS | Mod: CPTII,S$GLB,, | Performed by: ORTHOPAEDIC SURGERY

## 2023-02-27 PROCEDURE — 99203 PR OFFICE/OUTPT VISIT, NEW, LEVL III, 30-44 MIN: ICD-10-PCS | Mod: S$GLB,,, | Performed by: ORTHOPAEDIC SURGERY

## 2023-02-27 PROCEDURE — 1160F RVW MEDS BY RX/DR IN RCRD: CPT | Mod: CPTII,S$GLB,, | Performed by: ORTHOPAEDIC SURGERY

## 2023-02-27 PROCEDURE — 1160F PR REVIEW ALL MEDS BY PRESCRIBER/CLIN PHARMACIST DOCUMENTED: ICD-10-PCS | Mod: CPTII,S$GLB,, | Performed by: ORTHOPAEDIC SURGERY

## 2023-02-27 PROCEDURE — 99203 OFFICE O/P NEW LOW 30 MIN: CPT | Mod: S$GLB,,, | Performed by: ORTHOPAEDIC SURGERY

## 2023-02-27 PROCEDURE — 1159F MED LIST DOCD IN RCRD: CPT | Mod: CPTII,S$GLB,, | Performed by: ORTHOPAEDIC SURGERY

## 2023-02-27 PROCEDURE — 4010F ACE/ARB THERAPY RXD/TAKEN: CPT | Mod: CPTII,S$GLB,, | Performed by: ORTHOPAEDIC SURGERY
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
## 2023-03-10 ENCOUNTER — TELEPHONE (OUTPATIENT)
Dept: FAMILY MEDICINE | Facility: CLINIC | Age: 63
End: 2023-03-10

## 2023-03-10 DIAGNOSIS — I10 ESSENTIAL HYPERTENSION: Primary | ICD-10-CM

## 2023-03-10 DIAGNOSIS — E78.5 HYPERLIPIDEMIA, UNSPECIFIED HYPERLIPIDEMIA TYPE: ICD-10-CM

## 2023-03-10 DIAGNOSIS — Z79.899 ENCOUNTER FOR LONG-TERM (CURRENT) USE OF OTHER MEDICATIONS: ICD-10-CM

## 2023-03-13 DIAGNOSIS — G89.4 CHRONIC PAIN SYNDROME: ICD-10-CM

## 2023-03-13 RX ORDER — HYDROCODONE BITARTRATE AND ACETAMINOPHEN 10; 325 MG/1; MG/1
1 TABLET ORAL EVERY 6 HOURS PRN
Qty: 90 TABLET | Refills: 0 | Status: SHIPPED | OUTPATIENT
Start: 2023-03-13 | End: 2023-04-12

## 2023-03-13 RX ORDER — HYDROCODONE BITARTRATE AND ACETAMINOPHEN 10; 325 MG/1; MG/1
1 TABLET ORAL EVERY 6 HOURS PRN
Qty: 90 TABLET | Refills: 0 | Status: SHIPPED | OUTPATIENT
Start: 2023-04-12 | End: 2023-05-05 | Stop reason: SDUPTHER

## 2023-03-13 RX ORDER — HYDROCODONE BITARTRATE AND ACETAMINOPHEN 10; 325 MG/1; MG/1
1 TABLET ORAL EVERY 6 HOURS PRN
Qty: 90 TABLET | Refills: 0 | Status: SHIPPED | OUTPATIENT
Start: 2023-05-12 | End: 2023-06-11

## 2023-03-16 ENCOUNTER — TELEPHONE (OUTPATIENT)
Dept: FAMILY MEDICINE | Facility: CLINIC | Age: 63
End: 2023-03-16

## 2023-03-16 NOTE — TELEPHONE ENCOUNTER
----- Message from Alban Serrano sent at 3/16/2023  1:03 PM CDT -----  Pt said he hasn't heard anything from [facility] about his MRI that Dr Bhatt ordered and he has an appt 03/22/23 for them to discuss his results of his MRI.  [phone]

## 2023-03-16 NOTE — TELEPHONE ENCOUNTER
Spoke to pt wife. States they havent heard anything regarding his MRI. Advised that they would need to contact Dr. Ellis office. Not sure if they are waiting to get auth for imaging. Wife voiced understanding.

## 2023-03-20 ENCOUNTER — TELEPHONE (OUTPATIENT)
Dept: FAMILY MEDICINE | Facility: CLINIC | Age: 63
End: 2023-03-20

## 2023-03-20 NOTE — TELEPHONE ENCOUNTER
----- Message from Tena Barrientos sent at 3/20/2023 11:30 AM CDT -----  Pt would like to get another appt for a check up and blood work.[phone]

## 2023-03-24 ENCOUNTER — HOSPITAL ENCOUNTER (OUTPATIENT)
Dept: RADIOLOGY | Facility: HOSPITAL | Age: 63
Discharge: HOME | End: 2023-03-24
Attending: ORTHOPAEDIC SURGERY
Payer: MEDICARE

## 2023-03-24 DIAGNOSIS — M47.814 THORACIC SPONDYLOSIS: ICD-10-CM

## 2023-03-24 PROCEDURE — 72146 MRI CHEST SPINE W/O DYE: CPT | Mod: TC

## 2023-03-27 ENCOUNTER — OFFICE VISIT (OUTPATIENT)
Dept: ORTHOPEDICS | Facility: CLINIC | Age: 63
End: 2023-03-27
Payer: MEDICARE

## 2023-03-27 VITALS — HEIGHT: 69 IN | WEIGHT: 230 LBS | BODY MASS INDEX: 34.07 KG/M2

## 2023-03-27 DIAGNOSIS — M54.14 THORACIC RADICULOPATHY: Primary | ICD-10-CM

## 2023-03-27 DIAGNOSIS — M47.814 THORACIC SPONDYLOSIS: ICD-10-CM

## 2023-03-27 DIAGNOSIS — Z79.891 LONG TERM (CURRENT) USE OF OPIATE ANALGESIC: ICD-10-CM

## 2023-03-27 PROCEDURE — 99213 PR OFFICE/OUTPT VISIT, EST, LEVL III, 20-29 MIN: ICD-10-PCS | Mod: S$GLB,,, | Performed by: ORTHOPAEDIC SURGERY

## 2023-03-27 PROCEDURE — 1159F MED LIST DOCD IN RCRD: CPT | Mod: CPTII,S$GLB,, | Performed by: ORTHOPAEDIC SURGERY

## 2023-03-27 PROCEDURE — 99213 OFFICE O/P EST LOW 20 MIN: CPT | Mod: S$GLB,,, | Performed by: ORTHOPAEDIC SURGERY

## 2023-03-27 PROCEDURE — 1160F RVW MEDS BY RX/DR IN RCRD: CPT | Mod: CPTII,S$GLB,, | Performed by: ORTHOPAEDIC SURGERY

## 2023-03-27 PROCEDURE — 1160F PR REVIEW ALL MEDS BY PRESCRIBER/CLIN PHARMACIST DOCUMENTED: ICD-10-PCS | Mod: CPTII,S$GLB,, | Performed by: ORTHOPAEDIC SURGERY

## 2023-03-27 PROCEDURE — 4010F ACE/ARB THERAPY RXD/TAKEN: CPT | Mod: CPTII,S$GLB,, | Performed by: ORTHOPAEDIC SURGERY

## 2023-03-27 PROCEDURE — 4010F PR ACE/ARB THEARPY RXD/TAKEN: ICD-10-PCS | Mod: CPTII,S$GLB,, | Performed by: ORTHOPAEDIC SURGERY

## 2023-03-27 PROCEDURE — 3008F BODY MASS INDEX DOCD: CPT | Mod: CPTII,S$GLB,, | Performed by: ORTHOPAEDIC SURGERY

## 2023-03-27 PROCEDURE — 3008F PR BODY MASS INDEX (BMI) DOCUMENTED: ICD-10-PCS | Mod: CPTII,S$GLB,, | Performed by: ORTHOPAEDIC SURGERY

## 2023-03-27 PROCEDURE — 1159F PR MEDICATION LIST DOCUMENTED IN MEDICAL RECORD: ICD-10-PCS | Mod: CPTII,S$GLB,, | Performed by: ORTHOPAEDIC SURGERY

## 2023-03-27 RX ORDER — SOD SULF/POT CHLORIDE/MAG SULF 1.479 G
TABLET ORAL
COMMUNITY
Start: 2023-01-09

## 2023-03-27 NOTE — PROGRESS NOTES
Subjective:       Patient ID: Patrick Wilson is a 62 y.o. male.    Chief Complaint: Pain of the Middle Back (Here for MRI Thoracic results, no change, pain is same,  Pain down right arm to elbow, no leg pain, numbness to right arm to elbow)      History of Present Illness    Prior to meeting with the patient I reviewed the medical chart in [facility]. This included reviewing the previous progress notes from our office, review of the patient's last appointment with their primary care provider, review of any visits to the emergency room, and review of any pain management appointments or procedures.   He discuss results of MRI patient does have some persistent thoracic pain.  Also history of chronic low back pain and previous lumbar surgery has been on long-term opiate medication    Current Medications  Current Outpatient Medications   Medication Sig Dispense Refill    ALPRAZolam (XANAX) 1 MG tablet Take 1 tablet (1 mg total) by mouth once daily. 30 tablet 2    amLODIPine (NORVASC) 10 MG tablet Take 1 tablet (10 mg total) by mouth once daily. 90 tablet 1    aspirin (ECOTRIN) 81 MG EC tablet Take 81 mg by mouth once daily.      atorvastatin (LIPITOR) 40 MG tablet Take 1 tablet (40 mg total) by mouth once daily. 90 tablet 1    augmented betamethasone dipropionate (DIPROLENE-AF) 0.05 % cream Use on AA of hand twice daily. 50 g 1    cetirizine (ZYRTEC) 10 MG tablet Take 1 tablet (10 mg total) by mouth once daily. 90 tablet 1    clobetasoL (TEMOVATE) 0.05 % cream Apply topically 2 (two) times daily. for 7 days 45 g 0    cyclobenzaprine (FLEXERIL) 5 MG tablet Take 1 tablet (5 mg total) by mouth 3 (three) times daily as needed. 90 tablet 2    D3/E/Se/soy isofl/tocoph/lycop (PROSTATE 2.4 ORAL) Take by mouth.      DULoxetine (CYMBALTA) 60 MG capsule Take 1 capsule (60 mg total) by mouth once daily. 90 capsule 1    fluticasone propionate (FLONASE) 50 mcg/actuation nasal spray 1 spray (50 mcg total) by Each Nostril route as  needed. 16 g 3    gabapentin (NEURONTIN) 300 MG capsule Take 3 capsules (900 mg total) by mouth 3 (three) times daily. 810 capsule 1    hydroCHLOROthiazide (HYDRODIURIL) 25 MG tablet Take 1 tablet (25 mg total) by mouth once daily. 90 tablet 1    HYDROcodone-acetaminophen (NORCO)  mg per tablet Take 1 tablet by mouth every 6 (six) hours as needed for Pain. 90 tablet 0    HYDROcodone-acetaminophen (NORCO)  mg per tablet Take 1 tablet by mouth every 6 (six) hours as needed for Pain. 90 tablet 0    HYDROcodone-acetaminophen (NORCO)  mg per tablet Take 1 tablet by mouth every 6 (six) hours as needed for Pain. 90 tablet 0    HYDROcodone-acetaminophen (NORCO)  mg per tablet Take 1 tablet by mouth every 6 (six) hours as needed for Pain. 90 tablet 0    HYDROcodone-acetaminophen (NORCO)  mg per tablet Take 1 tablet by mouth every 6 (six) hours as needed for Pain. 90 tablet 0    HYDROcodone-acetaminophen (NORCO)  mg per tablet Take 1 tablet by mouth every 6 (six) hours as needed for Pain. 90 tablet 0    HYDROcodone-acetaminophen (NORCO)  mg per tablet Take 1 tablet by mouth every 6 (six) hours as needed for Pain. 90 tablet 0    [START ON 4/12/2023] HYDROcodone-acetaminophen (NORCO)  mg per tablet Take 1 tablet by mouth every 6 (six) hours as needed for Pain. 90 tablet 0    [START ON 5/12/2023] HYDROcodone-acetaminophen (NORCO)  mg per tablet Take 1 tablet by mouth every 6 (six) hours as needed for Pain. 90 tablet 0    KRILL OIL ORAL Take by mouth.      lisinopriL 10 MG tablet Take 1 tablet (10 mg total) by mouth once daily. 90 tablet 1    multivit-min/folic/vit K/lycop (MEN'S 50 PLUS MULTIVITAMIN ORAL) Take 1 tablet by mouth once daily.      oxybutynin (DITROPAN-XL) 10 MG 24 hr tablet Take 1 tablet (10 mg total) by mouth once daily. 30 tablet 11    pantoprazole (PROTONIX) 40 MG tablet Take 1 tablet (40 mg total) by mouth once daily. 90 tablet 1    sildenafiL (VIAGRA) 100  MG tablet Take 1 tablet (100 mg total) by mouth as needed for Erectile Dysfunction. 6 tablet 6    SUTAB 1.479-0.188- 0.225 gram tablet Take by mouth.      tamsulosin (FLOMAX) 0.4 mg Cap Take 1 capsule (0.4 mg total) by mouth every evening. 30 capsule 11    triamcinolone acetonide 0.025% (KENALOG) 0.025 % cream Apply topically 2 (two) times daily. 80 g 0     No current facility-administered medications for this visit.     Facility-Administered Medications Ordered in Other Visits   Medication Dose Route Frequency Provider Last Rate Last Admin    ampicillin 2 g in sodium chloride 0.9 % 100 mL IVPB (ready to mix system)  2 g Intravenous On Call Procedure Jonathan Jacinto MD   2 g at 05/27/21 1234       Allergies  Review of patient's allergies indicates:   Allergen Reactions    Tylox [oxycodone-acetaminophen] Nausea And Vomiting       Past Medical History  Past Medical History:   Diagnosis Date    Anticoagulant long-term use     Anxiety     Arthritis     Back pain     Cerebral palsy     Depression     GERD (gastroesophageal reflux disease)     Hypertension     Peptic ulcer of stomach     Seizures     AS A CHILD    Wears glasses     CONTACS       Surgical History  Past Surgical History:   Procedure Laterality Date    ANKLE SURGERY Left     BACK SURGERY      CYSTOSCOPY N/A 7/17/2018    Procedure: CYSTOSCOPY;  Surgeon: Jonathan Jacinto MD;  Location: [facility] OR;  Service: Urology;  Laterality: N/A;    CYSTOSCOPY N/A 4/6/2021    Procedure: CYSTOSCOPY;  Surgeon: Jonathan Jacinto MD;  Location: [facility] OR;  Service: Urology;  Laterality: N/A;    CYSTOSCOPY WITH INSERTION OF MINIMALLY INVASIVE IMPLANT TO ENLARGE PROSTATIC URETHRA N/A 8/2/2018    Procedure: CYSTOSCOPY, WITH INSERTION OF UROLIFT IMPLANT;  Surgeon: Jonathan Jacinto MD;  Location: [facility] OR;  Service: Urology;  Laterality: N/A;    LEG SURGERY Left     SHOULDER SURGERY Right     TRANSRECTAL ULTRASOUND EXAMINATION N/A 7/17/2018    Procedure: ULTRASOUND, TRANSRECTAL;   Surgeon: Jonathan Jacinto MD;  Location: [facility] OR;  Service: Urology;  Laterality: N/A;    TRANSURETHRAL RESECTION OF PROSTATE N/A 5/27/2021    Procedure: TURP (TRANSURETHRAL RESECTION OF PROSTATE);  Surgeon: Jonathan Jacinto MD;  Location: [facility] OR;  Service: Urology;  Laterality: N/A;       Family History:   Family History   Problem Relation Age of Onset    Melanoma Father        Social History:   Social History     Socioeconomic History    Marital status:    Tobacco Use    Smoking status: Some Days     Types: Pipe    Smokeless tobacco: Never    Tobacco comments:     occasional.   Substance and Sexual Activity    Alcohol use: Yes     Comment: occasional    Drug use: No    Sexual activity: Yes     Partners: Female       Hospitalization/Major Diagnostic Procedure:     Review of Systems     General/Constitutional:  Chills denies. Fatigue denies. Fever denies. Weight gain denies. Weight loss denies.    Respiratory:  Shortness of breath denies.    Cardiovascular:  Chest pain denies.    Gastrointestinal:  Constipation denies. Diarrhea denies. Nausea denies. Vomiting denies.     Hematology:  Easy bruising denies. Prolonged bleeding denies.     Genitourinary:  Frequent urination denies. Pain in lower back denies. Painful urination denies.     Musculoskeletal:  See HPI for details    Skin:  Rash denies.    Neurologic:  Dizziness denies. Gait abnormalities denies. Seizures denies. Tingling/Numbess denies.    Psychiatric:  Anxiety denies. Depressed mood denies.     Objective:   Vital Signs: There were no vitals filed for this visit.     Physical Exam      General Examination:     Constitutional: The patient is alert and oriented to lace person and time. Mood is pleasant.     Head/Face: Normal facial features normal eyebrows    Eyes: Normal extraocular motion bilaterally    Lungs: Respirations are equal and unlabored    Gait is coordinated.    Cardiovascular: There are no swelling or varicosities  present.    Lymphatic: Negative for adenopathy    Skin: Normal    Neurological: Level of consciousness normal. Oriented to place person and time and situation    Psychiatric: Oriented to time place person and situation    Thoracic exam shows a moderate restriction of motion patient describes pain radiating into the right axillary area  XRAY Report/ Interpretation:  Thoracic MRI was personally reviewed please see report for details.  Evidence of disc protrusion right-sided T7-8 level no spinal cord compression      Assessment:       1. Thoracic radiculopathy    2. Long term (current) use of opiate analgesic    3. Thoracic spondylosis        Plan:       Patrick was seen today for pain.    Diagnoses and all orders for this visit:    Thoracic radiculopathy    Long term (current) use of opiate analgesic    Thoracic spondylosis         No follow-ups on file.    Symptoms compatible with findings at the T7-8 level patient referred to pain management for medication management as well as possible epidural injection nerve root block or facet injections in the thoracic spine.    P.m. P query has been pe rformed.  Patient on long-term opiate medications.  I will rule preferred to refer him to pain management for long-term medication management as well as previously mentioned pain management injections he is not a surgical candidate at this time and I do not foresee surgery for his thoracic pain      Treatment options were discussed with regards to the nature of the medical condition. Conservative pain intervention and surgical options were discussed in detail. The probability of success of each separate treatment option was discussed. The patient expressed a clear understanding of the treatment options. With regards to surgery, the procedure risk, benefits, complications, and outcomes were discussed. No guarantees were given with regards to surgical outcome.   The risk of complications, morbidity, and mortality of patient  management decisions have been made at the time of this visit. These are associated with the patient's problems, diagnostic procedures and treatment options. This includes the possible management options selected and those considered but not selected by the patient after shared medical decision making we discussed with the patient.     This note was created using Dragon voice recognition software that occasionally misinterpreted phrases or words.

## 2023-04-11 ENCOUNTER — PATIENT MESSAGE (OUTPATIENT)
Dept: ADMINISTRATIVE | Facility: HOSPITAL | Age: 63
End: 2023-04-11
Payer: MEDICARE

## 2023-04-17 DIAGNOSIS — K21.9 GASTROESOPHAGEAL REFLUX DISEASE, UNSPECIFIED WHETHER ESOPHAGITIS PRESENT: ICD-10-CM

## 2023-04-17 RX ORDER — PANTOPRAZOLE SODIUM 40 MG/1
40 TABLET, DELAYED RELEASE ORAL DAILY
Qty: 90 TABLET | Refills: 1 | Status: CANCELLED | OUTPATIENT
Start: 2023-04-17 | End: 2023-10-14

## 2023-05-05 DIAGNOSIS — G89.4 CHRONIC PAIN SYNDROME: ICD-10-CM

## 2023-05-08 ENCOUNTER — OFFICE VISIT (OUTPATIENT)
Dept: FAMILY MEDICINE | Facility: CLINIC | Age: 63
End: 2023-05-08
Payer: MEDICARE

## 2023-05-08 VITALS
WEIGHT: 230 LBS | DIASTOLIC BLOOD PRESSURE: 62 MMHG | BODY MASS INDEX: 34.07 KG/M2 | HEART RATE: 92 BPM | HEIGHT: 69 IN | SYSTOLIC BLOOD PRESSURE: 102 MMHG

## 2023-05-08 DIAGNOSIS — E78.5 HYPERLIPIDEMIA, UNSPECIFIED HYPERLIPIDEMIA TYPE: ICD-10-CM

## 2023-05-08 DIAGNOSIS — M47.814 THORACIC SPONDYLOSIS: Primary | ICD-10-CM

## 2023-05-08 DIAGNOSIS — I10 ESSENTIAL HYPERTENSION: ICD-10-CM

## 2023-05-08 DIAGNOSIS — M54.14 THORACIC RADICULOPATHY: ICD-10-CM

## 2023-05-08 PROCEDURE — 3074F PR MOST RECENT SYSTOLIC BLOOD PRESSURE < 130 MM HG: ICD-10-PCS | Mod: CPTII,S$GLB,, | Performed by: PHYSICIAN ASSISTANT

## 2023-05-08 PROCEDURE — 4010F PR ACE/ARB THEARPY RXD/TAKEN: ICD-10-PCS | Mod: CPTII,S$GLB,, | Performed by: PHYSICIAN ASSISTANT

## 2023-05-08 PROCEDURE — 1159F PR MEDICATION LIST DOCUMENTED IN MEDICAL RECORD: ICD-10-PCS | Mod: CPTII,S$GLB,, | Performed by: PHYSICIAN ASSISTANT

## 2023-05-08 PROCEDURE — 4010F ACE/ARB THERAPY RXD/TAKEN: CPT | Mod: CPTII,S$GLB,, | Performed by: PHYSICIAN ASSISTANT

## 2023-05-08 PROCEDURE — 1159F MED LIST DOCD IN RCRD: CPT | Mod: CPTII,S$GLB,, | Performed by: PHYSICIAN ASSISTANT

## 2023-05-08 PROCEDURE — 99214 PR OFFICE/OUTPT VISIT, EST, LEVL IV, 30-39 MIN: ICD-10-PCS | Mod: S$GLB,,, | Performed by: PHYSICIAN ASSISTANT

## 2023-05-08 PROCEDURE — 99214 OFFICE O/P EST MOD 30 MIN: CPT | Mod: S$GLB,,, | Performed by: PHYSICIAN ASSISTANT

## 2023-05-08 PROCEDURE — 3078F PR MOST RECENT DIASTOLIC BLOOD PRESSURE < 80 MM HG: ICD-10-PCS | Mod: CPTII,S$GLB,, | Performed by: PHYSICIAN ASSISTANT

## 2023-05-08 PROCEDURE — 3008F PR BODY MASS INDEX (BMI) DOCUMENTED: ICD-10-PCS | Mod: CPTII,S$GLB,, | Performed by: PHYSICIAN ASSISTANT

## 2023-05-08 PROCEDURE — 3078F DIAST BP <80 MM HG: CPT | Mod: CPTII,S$GLB,, | Performed by: PHYSICIAN ASSISTANT

## 2023-05-08 PROCEDURE — 3074F SYST BP LT 130 MM HG: CPT | Mod: CPTII,S$GLB,, | Performed by: PHYSICIAN ASSISTANT

## 2023-05-08 PROCEDURE — 3008F BODY MASS INDEX DOCD: CPT | Mod: CPTII,S$GLB,, | Performed by: PHYSICIAN ASSISTANT

## 2023-05-08 RX ORDER — HYDROCODONE BITARTRATE AND ACETAMINOPHEN 10; 325 MG/1; MG/1
1 TABLET ORAL EVERY 6 HOURS PRN
Qty: 90 TABLET | Refills: 0 | Status: SHIPPED | OUTPATIENT
Start: 2023-06-07 | End: 2023-06-12 | Stop reason: SDUPTHER

## 2023-05-08 RX ORDER — HYDROCODONE BITARTRATE AND ACETAMINOPHEN 10; 325 MG/1; MG/1
1 TABLET ORAL EVERY 6 HOURS PRN
Qty: 90 TABLET | Refills: 0 | Status: SHIPPED | OUTPATIENT
Start: 2023-05-08 | End: 2023-06-07

## 2023-05-08 RX ORDER — HYDROCODONE BITARTRATE AND ACETAMINOPHEN 10; 325 MG/1; MG/1
1 TABLET ORAL EVERY 6 HOURS PRN
Qty: 90 TABLET | Refills: 0 | Status: SHIPPED | OUTPATIENT
Start: 2023-07-07 | End: 2023-08-06

## 2023-05-08 NOTE — PROGRESS NOTES
SUBJECTIVE:    Patient ID: Patrick Wilson is a 63 y.o. male.    Chief Complaint: Hypertension (Went over meds verbally// SW)    This is a 63-year-old male who presents today for hypertension follow-up.  Past medical history significant chronic back pain, depression, CP with seizures as a child, hypertension and arthritis. He is working on getting appt scheduled with interventional pain management. Due to [employer] insurance we have to get him in with DR. Coughlin. Dr. Bhatt had placed a referral to Dr. Kraus but hes not on plan. He does have c-scope set up for later this month.      No visits with results within 6 Month(s) from this visit.   Latest known visit with results is:   Office Visit on 05/16/2022   Component Date Value Ref Range Status    Amphetamines 06/29/2022 NEGATIVE  <500 ng/mL Final    Barbiturates 06/29/2022 NEGATIVE  <300 ng/mL Final    Benzodiazepines 06/29/2022 POSITIVE (A)  <100 ng/mL Final    Alphahydroxyalprazolam 06/29/2022 88 (H)  <25 ng/mL Final    Alphahydroxymidazolam 06/29/2022 NEGATIVE  <50 ng/mL Final    Alphahydroxytriazolam 06/29/2022 NEGATIVE  <50 ng/mL Final    Aminoclonazepam 06/29/2022 120 (H)  <25 ng/mL Final    hydroxyethylflurazepam UR GC/MS 06/29/2022 NEGATIVE  <50 ng/mL Final    Lorazepam 06/29/2022 NEGATIVE  <50 ng/mL Final    Nordiazepam Lvl 06/29/2022 NEGATIVE  <50 ng/mL Final    Oxazepam 06/29/2022 NEGATIVE  <50 ng/mL Final    Temazepam GC/MS Conf 06/29/2022 NEGATIVE  <50 ng/mL Final    Benzodiazepines Comments 06/29/2022    Final    Cocaine Metabolites 06/29/2022 NEGATIVE  <150 ng/mL Final    Methadone 06/29/2022 NEGATIVE  <100 ng/mL Final    Opiates 06/29/2022 POSITIVE (A)  <100 ng/mL Final    Codeine 06/29/2022 NEGATIVE  <50 ng/mL Final    Hydrocodone 06/29/2022 2,060 (H)  <50 ng/mL Final    Hydromorphone 06/29/2022 207 (H)  <50 ng/mL Final    Morphine 06/29/2022 NEGATIVE  <50 ng/mL Final    NORHYDROCODONE 06/29/2022 1,117 (H)  <50 ng/mL Final    Opiates Comments  06/29/2022    Final    Oxycodone 06/29/2022 NEGATIVE  <100 ng/mL Final    Phencyclidine 06/29/2022 NEGATIVE  <25 ng/mL Final    Creatinine 06/29/2022 109.9  > or = 20.0 mg/dL Final    pH 06/29/2022 6.0  4.5 - 9.0 Final    Oxidants, Urine (Tox) 06/29/2022 NEGATIVE  <200 mcg/mL Final    Notes and Comments 06/29/2022    Final       Past Medical History:   Diagnosis Date    Anticoagulant long-term use     Anxiety     Arthritis     Back pain     Cerebral palsy     Depression     GERD (gastroesophageal reflux disease)     Hypertension     Peptic ulcer of stomach     Seizures     AS A CHILD    Wears glasses     CONTACS     Past Surgical History:   Procedure Laterality Date    ANKLE SURGERY Left     BACK SURGERY      CYSTOSCOPY N/A 7/17/2018    Procedure: CYSTOSCOPY;  Surgeon: Jonathan Jacinto MD;  Location: [facility] OR;  Service: Urology;  Laterality: N/A;    CYSTOSCOPY N/A 4/6/2021    Procedure: CYSTOSCOPY;  Surgeon: Jonathan Jacinto MD;  Location: [facility] OR;  Service: Urology;  Laterality: N/A;    CYSTOSCOPY WITH INSERTION OF MINIMALLY INVASIVE IMPLANT TO ENLARGE PROSTATIC URETHRA N/A 8/2/2018    Procedure: CYSTOSCOPY, WITH INSERTION OF UROLIFT IMPLANT;  Surgeon: Jonathan Jacinto MD;  Location: [facility] OR;  Service: Urology;  Laterality: N/A;    LEG SURGERY Left     SHOULDER SURGERY Right     TRANSRECTAL ULTRASOUND EXAMINATION N/A 7/17/2018    Procedure: ULTRASOUND, TRANSRECTAL;  Surgeon: Jonathan Jacinto MD;  Location: [facility] OR;  Service: Urology;  Laterality: N/A;    TRANSURETHRAL RESECTION OF PROSTATE N/A 5/27/2021    Procedure: TURP (TRANSURETHRAL RESECTION OF PROSTATE);  Surgeon: Jonathan Jacinto MD;  Location: [facility] OR;  Service: Urology;  Laterality: N/A;     Family History   Problem Relation Age of Onset    Melanoma Father        Marital Status:   Alcohol History:  reports current alcohol use.  Tobacco History:  reports that he has been smoking pipe. He has been exposed to tobacco smoke. He has never used  smokeless tobacco.  Drug History:  reports no history of drug use.    Review of patient's allergies indicates:   Allergen Reactions    Tylox [oxycodone-acetaminophen] Nausea And Vomiting       Current Outpatient Medications:     ALPRAZolam (XANAX) 1 MG tablet, Take 1 tablet (1 mg total) by mouth once daily., Disp: 30 tablet, Rfl: 2    amLODIPine (NORVASC) 10 MG tablet, Take 1 tablet (10 mg total) by mouth once daily., Disp: 90 tablet, Rfl: 1    aspirin (ECOTRIN) 81 MG EC tablet, Take 81 mg by mouth once daily., Disp: , Rfl:     atorvastatin (LIPITOR) 40 MG tablet, Take 1 tablet (40 mg total) by mouth once daily., Disp: 90 tablet, Rfl: 1    augmented betamethasone dipropionate (DIPROLENE-AF) 0.05 % cream, Use on AA of hand twice daily., Disp: 50 g, Rfl: 1    cetirizine (ZYRTEC) 10 MG tablet, Take 1 tablet (10 mg total) by mouth once daily., Disp: 90 tablet, Rfl: 1    clobetasoL (TEMOVATE) 0.05 % cream, Apply topically 2 (two) times daily. for 7 days, Disp: 45 g, Rfl: 0    cyclobenzaprine (FLEXERIL) 5 MG tablet, Take 1 tablet (5 mg total) by mouth 3 (three) times daily as needed., Disp: 90 tablet, Rfl: 2    D3/E/Se/soy isofl/tocoph/lycop (PROSTATE 2.4 ORAL), Take by mouth., Disp: , Rfl:     DULoxetine (CYMBALTA) 60 MG capsule, Take 1 capsule (60 mg total) by mouth once daily., Disp: 90 capsule, Rfl: 1    fluticasone propionate (FLONASE) 50 mcg/actuation nasal spray, 1 spray (50 mcg total) by Each Nostril route as needed., Disp: 16 g, Rfl: 3    gabapentin (NEURONTIN) 300 MG capsule, Take 3 capsules (900 mg total) by mouth 3 (three) times daily., Disp: 810 capsule, Rfl: 1    hydroCHLOROthiazide (HYDRODIURIL) 25 MG tablet, Take 1 tablet (25 mg total) by mouth once daily., Disp: 90 tablet, Rfl: 1    HYDROcodone-acetaminophen (NORCO)  mg per tablet, Take 1 tablet by mouth every 6 (six) hours as needed for Pain., Disp: 90 tablet, Rfl: 0    HYDROcodone-acetaminophen (NORCO)  mg per tablet, Take 1 tablet by mouth  every 6 (six) hours as needed for Pain., Disp: 90 tablet, Rfl: 0    HYDROcodone-acetaminophen (NORCO)  mg per tablet, Take 1 tablet by mouth every 6 (six) hours as needed for Pain., Disp: 90 tablet, Rfl: 0    HYDROcodone-acetaminophen (NORCO)  mg per tablet, Take 1 tablet by mouth every 6 (six) hours as needed for Pain., Disp: 90 tablet, Rfl: 0    HYDROcodone-acetaminophen (NORCO)  mg per tablet, Take 1 tablet by mouth every 6 (six) hours as needed for Pain., Disp: 90 tablet, Rfl: 0    HYDROcodone-acetaminophen (NORCO)  mg per tablet, Take 1 tablet by mouth every 6 (six) hours as needed for Pain., Disp: 90 tablet, Rfl: 0    [START ON 5/12/2023] HYDROcodone-acetaminophen (NORCO)  mg per tablet, Take 1 tablet by mouth every 6 (six) hours as needed for Pain., Disp: 90 tablet, Rfl: 0    KRILL OIL ORAL, Take by mouth., Disp: , Rfl:     lisinopriL 10 MG tablet, Take 1 tablet (10 mg total) by mouth once daily., Disp: 90 tablet, Rfl: 1    multivit-min/folic/vit K/lycop (MEN'S 50 PLUS MULTIVITAMIN ORAL), Take 1 tablet by mouth once daily., Disp: , Rfl:     oxybutynin (DITROPAN-XL) 10 MG 24 hr tablet, Take 1 tablet (10 mg total) by mouth once daily., Disp: 30 tablet, Rfl: 11    pantoprazole (PROTONIX) 40 MG tablet, Take 1 tablet (40 mg total) by mouth once daily., Disp: 90 tablet, Rfl: 1    sildenafiL (VIAGRA) 100 MG tablet, Take 1 tablet (100 mg total) by mouth as needed for Erectile Dysfunction., Disp: 6 tablet, Rfl: 6    SUTAB 1.479-0.188- 0.225 gram tablet, Take by mouth., Disp: , Rfl:     tamsulosin (FLOMAX) 0.4 mg Cap, Take 1 capsule (0.4 mg total) by mouth every evening., Disp: 30 capsule, Rfl: 11    triamcinolone acetonide 0.025% (KENALOG) 0.025 % cream, Apply topically 2 (two) times daily., Disp: 80 g, Rfl: 0  No current facility-administered medications for this visit.    Facility-Administered Medications Ordered in Other Visits:     ampicillin 2 g in sodium chloride 0.9 % 100 mL IVPB  "(ready to mix system), 2 g, Intravenous, On Call Procedure, Jonathan Jacinto MD, 2 g at 05/27/21 1234    Review of Systems   Constitutional:  Negative for activity change, fatigue, fever and unexpected weight change.   HENT:  Negative for congestion.    Respiratory:  Negative for apnea, cough, chest tightness and shortness of breath.    Cardiovascular:  Negative for chest pain and palpitations.   Gastrointestinal:  Negative for abdominal distention and abdominal pain.   Genitourinary:  Positive for frequency. Negative for difficulty urinating and dysuria.        Nocturia- 3 times per night   Musculoskeletal:  Positive for arthralgias and back pain.   Neurological:  Negative for dizziness and weakness.        Objective:      Vitals:    05/08/23 1151   BP: 102/62   Pulse: 92   Weight: 104.3 kg (230 lb)   Height: 5' 9" (1.753 m)     Physical Exam  Vitals and nursing note reviewed.   Constitutional:       General: He is awake. He is not in acute distress.     Appearance: Normal appearance. He is obese. He is not ill-appearing.   HENT:      Head: Normocephalic and atraumatic.      Right Ear: External ear normal.      Left Ear: External ear normal.      Nose: Nose normal.      Mouth/Throat:      Lips: Pink.      Mouth: Mucous membranes are moist.      Dentition: Normal dentition.      Pharynx: Oropharynx is clear. Uvula midline.      Tonsils: No tonsillar exudate.   Eyes:      General: Vision grossly intact. Gaze aligned appropriately.      Extraocular Movements: Extraocular movements intact.      Conjunctiva/sclera: Conjunctivae normal.   Neck:      Thyroid: No thyroid mass.   Cardiovascular:      Rate and Rhythm: Normal rate and regular rhythm.      Pulses: Normal pulses.           Radial pulses are 2+ on the right side and 2+ on the left side.        Dorsalis pedis pulses are 2+ on the right side and 2+ on the left side.      Heart sounds: Normal heart sounds, S1 normal and S2 normal. No murmur heard.  Pulmonary:     "  Effort: Pulmonary effort is normal.      Breath sounds: Normal breath sounds and air entry. No wheezing or rales.   Chest:      Chest wall: Tenderness (right rib series) present.   Abdominal:      General: Abdomen is flat. Bowel sounds are normal. There is no distension.      Tenderness: There is no abdominal tenderness.   Musculoskeletal:      Right shoulder: No swelling, tenderness or bony tenderness. Normal range of motion.      Left shoulder: No swelling, tenderness or bony tenderness. Normal range of motion.      Cervical back: Spasms and tenderness present. Decreased range of motion.      Right knee: No swelling, erythema, bony tenderness or crepitus. Normal range of motion. No tenderness.      Left knee: No swelling, erythema, bony tenderness or crepitus. Normal range of motion. No tenderness.      Right lower leg: No edema.      Left lower leg: No edema.   Skin:     General: Skin is warm and dry.      Capillary Refill: Capillary refill takes less than 2 seconds.   Neurological:      General: No focal deficit present.      Mental Status: He is alert and oriented to person, place, and time.   Psychiatric:         Attention and Perception: Attention normal.         Mood and Affect: Mood normal. Mood is not depressed (States that he has been through a lot but is happy where he is now). Affect is blunt (Talks slowly and with little inflection).         Behavior: Behavior is cooperative.         Assessment:       1. Thoracic spondylosis    2. Thoracic radiculopathy    3. Essential hypertension    4. Hyperlipidemia, unspecified hyperlipidemia type         Plan:       Thoracic spondylosis  Comments:  will warrant further eval with interventional pain management. DR. Coughlin referral placed now.    Thoracic radiculopathy    Essential hypertension  Comments:  BP is well controlled. will continue current regemin.    Hyperlipidemia, unspecified hyperlipidemia type  Comments:  needs updated labs.      Follow up in about 6  months (around 11/8/2023).        5/8/2023 Yaya Nick PA-C

## 2023-05-30 DIAGNOSIS — F41.9 ANXIETY DISORDER, UNSPECIFIED TYPE: ICD-10-CM

## 2023-05-30 RX ORDER — ALPRAZOLAM 1 MG/1
1 TABLET ORAL DAILY
Qty: 30 TABLET | Refills: 2 | Status: SHIPPED | OUTPATIENT
Start: 2023-06-25 | End: 2023-08-03 | Stop reason: SDUPTHER

## 2023-05-30 NOTE — TELEPHONE ENCOUNTER
Pt is needing a refill on his xanax. Last office visit 05/08/2023. Next office visit 11/13/2023. Chart shows last dispense 05/26/2023. UDS done on 06/29/2022.

## 2023-06-07 DIAGNOSIS — F41.9 ANXIETY DISORDER, UNSPECIFIED TYPE: ICD-10-CM

## 2023-06-07 RX ORDER — DULOXETIN HYDROCHLORIDE 60 MG/1
60 CAPSULE, DELAYED RELEASE ORAL DAILY
Qty: 90 CAPSULE | Refills: 1 | Status: SHIPPED | OUTPATIENT
Start: 2023-06-07 | End: 2023-11-13 | Stop reason: SDUPTHER

## 2023-06-07 NOTE — TELEPHONE ENCOUNTER
----- Message from Marylin Serrano MA sent at 6/7/2023 11:22 AM CDT -----  Regarding: refill  Pt needs duloxetine [facility] on [address] [phone]

## 2023-06-16 DIAGNOSIS — E78.5 HYPERLIPIDEMIA, UNSPECIFIED HYPERLIPIDEMIA TYPE: ICD-10-CM

## 2023-06-16 RX ORDER — ATORVASTATIN CALCIUM 40 MG/1
40 TABLET, FILM COATED ORAL DAILY
Qty: 90 TABLET | Refills: 1 | Status: SHIPPED | OUTPATIENT
Start: 2023-06-16 | End: 2023-11-02 | Stop reason: SDUPTHER

## 2023-06-16 NOTE — TELEPHONE ENCOUNTER
Pt is needing a refill on her atorvastatin. Last office visit 05/08/2023. Next office visit 11/13/2023.

## 2023-07-03 DIAGNOSIS — F41.9 ANXIETY DISORDER, UNSPECIFIED TYPE: ICD-10-CM

## 2023-07-03 DIAGNOSIS — I10 ESSENTIAL HYPERTENSION: ICD-10-CM

## 2023-07-03 RX ORDER — LISINOPRIL 10 MG/1
10 TABLET ORAL DAILY
Qty: 90 TABLET | Refills: 1 | Status: SHIPPED | OUTPATIENT
Start: 2023-07-03 | End: 2024-07-02

## 2023-07-03 RX ORDER — ALPRAZOLAM 1 MG/1
1 TABLET ORAL DAILY
Qty: 30 TABLET | Refills: 2 | Status: CANCELLED | OUTPATIENT
Start: 2023-07-03

## 2023-07-03 NOTE — TELEPHONE ENCOUNTER
Osmani just sent this in June 25th with 2 refills-  Patient should have prescription at the pharmacy

## 2023-07-03 NOTE — TELEPHONE ENCOUNTER
Spoke with pt spouse Aura in regards to message sent. Verbalized per Kenisha that a new prescription was sent to the pt pharmacy on 06/25/2023 for a 30 day supply with 2 refills. All they need to do is call the pharmacy for this refill. Aura acknowledged understanding.    Aura states that the pt needed a refill on his Lisinopril. Last office visit 5/08/2023. Next office visit 11/13/2023

## 2023-07-05 ENCOUNTER — TELEPHONE (OUTPATIENT)
Dept: FAMILY MEDICINE | Facility: CLINIC | Age: 63
End: 2023-07-05

## 2023-07-05 NOTE — TELEPHONE ENCOUNTER
----- Message from Kenisha An sent at 7/5/2023  8:09 AM CDT -----  Contact: Aura De Leonill for Lisinopril. [facility]. Pt #[phone]

## 2023-07-05 NOTE — TELEPHONE ENCOUNTER
----- Message from Kenisha An sent at 7/5/2023  8:09 AM CDT -----  Contact: Aura DeL eonill for Lisinopril. [facility]. Pt #[phone]

## 2023-07-05 NOTE — TELEPHONE ENCOUNTER
Spoke ot pt wife. Advised the rx was sent on Monday. Please contact the pharmacy to see if it is ready for

## 2023-07-13 DIAGNOSIS — G89.4 CHRONIC PAIN SYNDROME: ICD-10-CM

## 2023-07-13 RX ORDER — HYDROCODONE BITARTRATE AND ACETAMINOPHEN 10; 325 MG/1; MG/1
1 TABLET ORAL EVERY 6 HOURS PRN
Qty: 90 TABLET | Refills: 0 | Status: CANCELLED | OUTPATIENT
Start: 2023-07-13 | End: 2023-08-12

## 2023-07-13 NOTE — TELEPHONE ENCOUNTER
Pt is needing a refill on his [place]. Last office visit 05/08/2023. Next office visit 11/13/2023. Chart shows last dispense 07/12/2023.

## 2023-07-24 LAB — CRC RECOMMENDATION EXT: NORMAL

## 2023-08-03 DIAGNOSIS — F41.9 ANXIETY DISORDER, UNSPECIFIED TYPE: ICD-10-CM

## 2023-08-03 RX ORDER — ALPRAZOLAM 1 MG/1
1 TABLET ORAL DAILY
Qty: 30 TABLET | Refills: 2 | Status: SHIPPED | OUTPATIENT
Start: 2023-08-03 | End: 2023-11-09 | Stop reason: SDUPTHER

## 2023-08-03 NOTE — TELEPHONE ENCOUNTER
----- Message from Sara Chavez sent at 8/3/2023  9:26 AM CDT -----  Contact: Wife    Pts wife calling in for refill on Xanax  [facility] [address]      [phone]

## 2023-08-16 DIAGNOSIS — G89.4 CHRONIC PAIN SYNDROME: ICD-10-CM

## 2023-08-16 RX ORDER — HYDROCODONE BITARTRATE AND ACETAMINOPHEN 10; 325 MG/1; MG/1
1 TABLET ORAL EVERY 6 HOURS PRN
Qty: 90 TABLET | Refills: 0 | Status: SHIPPED | OUTPATIENT
Start: 2023-08-16 | End: 2023-09-11 | Stop reason: SDUPTHER

## 2023-08-21 DIAGNOSIS — I10 ESSENTIAL HYPERTENSION: ICD-10-CM

## 2023-08-21 RX ORDER — AMLODIPINE BESYLATE 10 MG/1
10 TABLET ORAL DAILY
Qty: 90 TABLET | Refills: 3 | Status: SHIPPED | OUTPATIENT
Start: 2023-08-21 | End: 2024-03-21 | Stop reason: SDUPTHER

## 2023-09-06 DIAGNOSIS — F41.9 ANXIETY DISORDER, UNSPECIFIED TYPE: ICD-10-CM

## 2023-09-06 RX ORDER — DULOXETIN HYDROCHLORIDE 60 MG/1
60 CAPSULE, DELAYED RELEASE ORAL DAILY
Qty: 90 CAPSULE | Refills: 1 | Status: CANCELLED | OUTPATIENT
Start: 2023-09-06

## 2023-09-06 RX ORDER — ALPRAZOLAM 1 MG/1
1 TABLET ORAL DAILY
Qty: 30 TABLET | Refills: 2 | Status: CANCELLED | OUTPATIENT
Start: 2023-09-06

## 2023-09-06 NOTE — TELEPHONE ENCOUNTER
Pt is needing a refill on his Cymbalta. Last office visit 5/8/2023. Next office visit 11/13/2023.

## 2023-09-06 NOTE — TELEPHONE ENCOUNTER
Pt is needing a refill on his xanax. Last office visit 05/08/2023. Next office visit 11/13/2023. Chart shows last dispense 08/03/2023. Last UDS done on 06/29/2022.

## 2023-09-07 ENCOUNTER — TELEPHONE (OUTPATIENT)
Dept: FAMILY MEDICINE | Facility: CLINIC | Age: 63
End: 2023-09-07

## 2023-09-07 NOTE — TELEPHONE ENCOUNTER
----- Message from Griesl Ybarra MA sent at 9/7/2023 11:42 AM CDT -----    ----- Message -----  From: Ligia Carrillo  Sent: 9/7/2023  11:29 AM CDT  To: Jonathan Chacon Staff    Patient wife Aura called and stated that the patient wanted to know why his medication  Xanax and his duloxetine was denied ? Please give her a call at [phone]

## 2023-09-07 NOTE — TELEPHONE ENCOUNTER
Spoke with pt's spouse Aura in regards to message sent. Verbalized to Aura that both prescription have refills. All she needs to is call the pharmacy for the refills. Pt acknowledged understanding.

## 2023-09-11 DIAGNOSIS — G89.4 CHRONIC PAIN SYNDROME: ICD-10-CM

## 2023-09-12 RX ORDER — HYDROCODONE BITARTRATE AND ACETAMINOPHEN 10; 325 MG/1; MG/1
1 TABLET ORAL EVERY 6 HOURS PRN
Qty: 90 TABLET | Refills: 0 | Status: SHIPPED | OUTPATIENT
Start: 2023-09-17 | End: 2023-10-12 | Stop reason: SDUPTHER

## 2023-09-12 RX ORDER — HYDROCODONE BITARTRATE AND ACETAMINOPHEN 10; 325 MG/1; MG/1
1 TABLET ORAL EVERY 6 HOURS PRN
Qty: 90 TABLET | Refills: 0 | Status: SHIPPED | OUTPATIENT
Start: 2023-11-16 | End: 2023-12-16

## 2023-09-12 RX ORDER — HYDROCODONE BITARTRATE AND ACETAMINOPHEN 10; 325 MG/1; MG/1
1 TABLET ORAL EVERY 6 HOURS PRN
Qty: 90 TABLET | Refills: 0 | Status: SHIPPED | OUTPATIENT
Start: 2023-10-17 | End: 2023-11-16

## 2023-09-26 LAB
ALBUMIN SERPL-MCNC: 4.5 G/DL (ref 3.6–5.1)
ALBUMIN/CREAT UR: 8 MCG/MG CREAT
ALBUMIN/GLOB SERPL: 1.8 (CALC) (ref 1–2.5)
ALP SERPL-CCNC: 131 U/L (ref 35–144)
ALT SERPL-CCNC: 50 U/L (ref 9–46)
APPEARANCE UR: CLEAR
AST SERPL-CCNC: 19 U/L (ref 10–35)
BACTERIA #/AREA URNS HPF: NORMAL /HPF
BACTERIA UR CULT: NORMAL
BASOPHILS # BLD AUTO: 31 CELLS/UL (ref 0–200)
BASOPHILS NFR BLD AUTO: 0.6 %
BILIRUB SERPL-MCNC: 0.6 MG/DL (ref 0.2–1.2)
BILIRUB UR QL STRIP: NEGATIVE
BUN SERPL-MCNC: 17 MG/DL (ref 7–25)
BUN/CREAT SERPL: ABNORMAL (CALC) (ref 6–22)
CALCIUM SERPL-MCNC: 9.6 MG/DL (ref 8.6–10.3)
CHLORIDE SERPL-SCNC: 98 MMOL/L (ref 98–110)
CHOLEST SERPL-MCNC: 235 MG/DL
CHOLEST/HDLC SERPL: 4.4 (CALC)
CO2 SERPL-SCNC: 26 MMOL/L (ref 20–32)
COLOR UR: NORMAL
CREAT SERPL-MCNC: 0.86 MG/DL (ref 0.7–1.35)
CREAT UR-MCNC: 179 MG/DL (ref 20–320)
EGFR: 99 ML/MIN/1.73M2
EOSINOPHIL # BLD AUTO: 117 CELLS/UL (ref 15–500)
EOSINOPHIL NFR BLD AUTO: 2.3 %
ERYTHROCYTE [DISTWIDTH] IN BLOOD BY AUTOMATED COUNT: 12.6 % (ref 11–15)
GLOBULIN SER CALC-MCNC: 2.5 G/DL (CALC) (ref 1.9–3.7)
GLUCOSE SERPL-MCNC: 113 MG/DL (ref 65–99)
GLUCOSE UR QL STRIP: NEGATIVE
HCT VFR BLD AUTO: 48.9 % (ref 38.5–50)
HDLC SERPL-MCNC: 54 MG/DL
HGB BLD-MCNC: 16.2 G/DL (ref 13.2–17.1)
HGB UR QL STRIP: NEGATIVE
HYALINE CASTS #/AREA URNS LPF: NORMAL /LPF
KETONES UR QL STRIP: NEGATIVE
LDLC SERPL CALC-MCNC: 148 MG/DL (CALC)
LEUKOCYTE ESTERASE UR QL STRIP: NEGATIVE
LYMPHOCYTES # BLD AUTO: 1326 CELLS/UL (ref 850–3900)
LYMPHOCYTES NFR BLD AUTO: 26 %
MCH RBC QN AUTO: 29 PG (ref 27–33)
MCHC RBC AUTO-ENTMCNC: 33.1 G/DL (ref 32–36)
MCV RBC AUTO: 87.6 FL (ref 80–100)
MICROALBUMIN UR-MCNC: 1.5 MG/DL
MONOCYTES # BLD AUTO: 408 CELLS/UL (ref 200–950)
MONOCYTES NFR BLD AUTO: 8 %
NEUTROPHILS # BLD AUTO: 3218 CELLS/UL (ref 1500–7800)
NEUTROPHILS NFR BLD AUTO: 63.1 %
NITRITE UR QL STRIP: NEGATIVE
NONHDLC SERPL-MCNC: 181 MG/DL (CALC)
PH UR STRIP: 6.5 [PH] (ref 5–8)
PLATELET # BLD AUTO: 272 THOUSAND/UL (ref 140–400)
PMV BLD REES-ECKER: 10 FL (ref 7.5–12.5)
POTASSIUM SERPL-SCNC: 4.4 MMOL/L (ref 3.5–5.3)
PROT SERPL-MCNC: 7 G/DL (ref 6.1–8.1)
PROT UR QL STRIP: NEGATIVE
RBC # BLD AUTO: 5.58 MILLION/UL (ref 4.2–5.8)
RBC #/AREA URNS HPF: NORMAL /HPF
SERVICE CMNT-IMP: NORMAL
SODIUM SERPL-SCNC: 138 MMOL/L (ref 135–146)
SP GR UR STRIP: 1.02 (ref 1–1.03)
SQUAMOUS #/AREA URNS HPF: NORMAL /HPF
TRIGL SERPL-MCNC: 189 MG/DL
TSH SERPL-ACNC: 1.55 MIU/L (ref 0.4–4.5)
WBC # BLD AUTO: 5.1 THOUSAND/UL (ref 3.8–10.8)
WBC #/AREA URNS HPF: NORMAL /HPF

## 2023-10-01 NOTE — PROGRESS NOTES
Call patient.  His sugar kidneys and thyroid all look good.  One mild liver enzyme elevation of ALT equals 50.  Cholesterol has risen slightly 235 triglycerides 189.  Has he been taking his atorvastatin 40 mg faithfully?  CBC shows no anemia.  Cut back on fried food and fast food to lower your cholesterol.

## 2023-10-02 ENCOUNTER — TELEPHONE (OUTPATIENT)
Dept: FAMILY MEDICINE | Facility: CLINIC | Age: 63
End: 2023-10-02

## 2023-10-02 NOTE — TELEPHONE ENCOUNTER
----- Message from Jonathan Chacon MD sent at 10/1/2023  6:20 PM CDT -----  Call patient.  His sugar kidneys and thyroid all look good.  One mild liver enzyme elevation of ALT equals 50.  Cholesterol has risen slightly 235 triglycerides 189.  Has he been taking his atorvastatin 40 mg faithfully?  CBC shows no anemia.  Cut back on fried food and fast food to lower your cholesterol.

## 2023-10-02 NOTE — TELEPHONE ENCOUNTER
Called patient in regards to recent lab results per Dr Chacon. Verbalized to patient Your sugar kidneys and thyroid all look good.  One mild liver enzyme elevation of ALT equals 50.  Cholesterol has risen slightly 235 triglycerides 189. CBC shows no anemia.  Cut back on fried food and fast food to lower your cholesterol.  Patient verbalize understanding       FYI-Patient stated he has been taking his atorvastatin like he suppose to.

## 2023-10-12 DIAGNOSIS — G89.4 CHRONIC PAIN SYNDROME: ICD-10-CM

## 2023-10-12 RX ORDER — HYDROCODONE BITARTRATE AND ACETAMINOPHEN 10; 325 MG/1; MG/1
1 TABLET ORAL EVERY 6 HOURS PRN
Qty: 90 TABLET | Refills: 0 | Status: SHIPPED | OUTPATIENT
Start: 2023-10-14

## 2023-10-23 DIAGNOSIS — K21.9 GASTROESOPHAGEAL REFLUX DISEASE, UNSPECIFIED WHETHER ESOPHAGITIS PRESENT: ICD-10-CM

## 2023-10-23 RX ORDER — PANTOPRAZOLE SODIUM 40 MG/1
40 TABLET, DELAYED RELEASE ORAL DAILY
Qty: 90 TABLET | Refills: 1 | Status: SHIPPED | OUTPATIENT
Start: 2023-10-23 | End: 2024-03-21 | Stop reason: SDUPTHER

## 2023-11-02 DIAGNOSIS — E78.5 HYPERLIPIDEMIA, UNSPECIFIED HYPERLIPIDEMIA TYPE: ICD-10-CM

## 2023-11-02 RX ORDER — ATORVASTATIN CALCIUM 40 MG/1
40 TABLET, FILM COATED ORAL DAILY
Qty: 90 TABLET | Refills: 1 | Status: SHIPPED | OUTPATIENT
Start: 2023-11-02 | End: 2023-11-13 | Stop reason: SDUPTHER

## 2023-11-09 ENCOUNTER — TELEPHONE (OUTPATIENT)
Dept: FAMILY MEDICINE | Facility: CLINIC | Age: 63
End: 2023-11-09

## 2023-11-09 DIAGNOSIS — F41.9 ANXIETY DISORDER, UNSPECIFIED TYPE: ICD-10-CM

## 2023-11-09 RX ORDER — ALPRAZOLAM 1 MG/1
1 TABLET ORAL DAILY
Qty: 30 TABLET | Refills: 2 | Status: SHIPPED | OUTPATIENT
Start: 2023-11-09 | End: 2023-11-13 | Stop reason: SDUPTHER

## 2023-11-09 NOTE — TELEPHONE ENCOUNTER
Last OV 5/8/2023  Upcoming OV 11/13/2023  UDS 6/29/2022    Per  last dispensed 10/09/2023    Rx order set up.

## 2023-11-09 NOTE — TELEPHONE ENCOUNTER
----- Message from Grisel Ybarra MA sent at 11/9/2023 12:08 PM CST -----    ----- Message -----  From: Phyllis Ballard  Sent: 11/9/2023  12:01 PM CST  To: Jonathan Chacon [facility]    Vm- 10:25-pt is calling back   [phone]

## 2023-11-09 NOTE — TELEPHONE ENCOUNTER
Spoke with pt and informed him that his prescription has been sent to his pharmacy. Pt voiced understanding.

## 2023-11-13 ENCOUNTER — OFFICE VISIT (OUTPATIENT)
Dept: FAMILY MEDICINE | Facility: CLINIC | Age: 63
End: 2023-11-13
Attending: FAMILY MEDICINE
Payer: MEDICARE

## 2023-11-13 VITALS
BODY MASS INDEX: 34.66 KG/M2 | DIASTOLIC BLOOD PRESSURE: 88 MMHG | HEART RATE: 100 BPM | SYSTOLIC BLOOD PRESSURE: 138 MMHG | HEIGHT: 69 IN | WEIGHT: 234 LBS

## 2023-11-13 DIAGNOSIS — R05.8 ACE-INHIBITOR COUGH: ICD-10-CM

## 2023-11-13 DIAGNOSIS — Z79.899 ENCOUNTER FOR LONG-TERM (CURRENT) USE OF OTHER MEDICATIONS: ICD-10-CM

## 2023-11-13 DIAGNOSIS — G89.4 CHRONIC PAIN SYNDROME: ICD-10-CM

## 2023-11-13 DIAGNOSIS — F41.9 ANXIETY DISORDER, UNSPECIFIED TYPE: ICD-10-CM

## 2023-11-13 DIAGNOSIS — E78.2 MIXED HYPERLIPIDEMIA: ICD-10-CM

## 2023-11-13 DIAGNOSIS — I10 ESSENTIAL HYPERTENSION: Primary | ICD-10-CM

## 2023-11-13 DIAGNOSIS — G80.9 INFANTILE CEREBRAL PALSY: ICD-10-CM

## 2023-11-13 DIAGNOSIS — E78.5 HYPERLIPIDEMIA, UNSPECIFIED HYPERLIPIDEMIA TYPE: ICD-10-CM

## 2023-11-13 DIAGNOSIS — T46.4X5A ACE-INHIBITOR COUGH: ICD-10-CM

## 2023-11-13 DIAGNOSIS — Z51.81 ENCOUNTER FOR THERAPEUTIC DRUG MONITORING: ICD-10-CM

## 2023-11-13 DIAGNOSIS — J30.89 NON-SEASONAL ALLERGIC RHINITIS, UNSPECIFIED TRIGGER: ICD-10-CM

## 2023-11-13 DIAGNOSIS — M51.9 LUMBAR DISC DISEASE: ICD-10-CM

## 2023-11-13 DIAGNOSIS — M51.9 THORACIC DISC DISEASE: ICD-10-CM

## 2023-11-13 DIAGNOSIS — K21.9 GASTROESOPHAGEAL REFLUX DISEASE WITHOUT ESOPHAGITIS: ICD-10-CM

## 2023-11-13 DIAGNOSIS — M54.9 BACK PAIN, UNSPECIFIED BACK LOCATION, UNSPECIFIED BACK PAIN LATERALITY, UNSPECIFIED CHRONICITY: ICD-10-CM

## 2023-11-13 DIAGNOSIS — I10 ESSENTIAL HYPERTENSION: ICD-10-CM

## 2023-11-13 DIAGNOSIS — N13.8 BPH WITH OBSTRUCTION/LOWER URINARY TRACT SYMPTOMS: ICD-10-CM

## 2023-11-13 DIAGNOSIS — N52.8 OTHER MALE ERECTILE DYSFUNCTION: ICD-10-CM

## 2023-11-13 DIAGNOSIS — N40.1 BPH WITH OBSTRUCTION/LOWER URINARY TRACT SYMPTOMS: ICD-10-CM

## 2023-11-13 PROCEDURE — 3061F NEG MICROALBUMINURIA REV: CPT | Mod: CPTII,S$GLB,, | Performed by: FAMILY MEDICINE

## 2023-11-13 PROCEDURE — 3079F DIAST BP 80-89 MM HG: CPT | Mod: CPTII,S$GLB,, | Performed by: FAMILY MEDICINE

## 2023-11-13 PROCEDURE — 1159F MED LIST DOCD IN RCRD: CPT | Mod: CPTII,S$GLB,, | Performed by: FAMILY MEDICINE

## 2023-11-13 PROCEDURE — 3066F PR DOCUMENTATION OF TREATMENT FOR NEPHROPATHY: ICD-10-PCS | Mod: CPTII,S$GLB,, | Performed by: FAMILY MEDICINE

## 2023-11-13 PROCEDURE — 4010F PR ACE/ARB THEARPY RXD/TAKEN: ICD-10-PCS | Mod: CPTII,S$GLB,, | Performed by: FAMILY MEDICINE

## 2023-11-13 PROCEDURE — 4010F ACE/ARB THERAPY RXD/TAKEN: CPT | Mod: CPTII,S$GLB,, | Performed by: FAMILY MEDICINE

## 2023-11-13 PROCEDURE — 3008F PR BODY MASS INDEX (BMI) DOCUMENTED: ICD-10-PCS | Mod: CPTII,S$GLB,, | Performed by: FAMILY MEDICINE

## 2023-11-13 PROCEDURE — 99214 OFFICE O/P EST MOD 30 MIN: CPT | Mod: S$GLB,,, | Performed by: FAMILY MEDICINE

## 2023-11-13 PROCEDURE — 3008F BODY MASS INDEX DOCD: CPT | Mod: CPTII,S$GLB,, | Performed by: FAMILY MEDICINE

## 2023-11-13 PROCEDURE — 3066F NEPHROPATHY DOC TX: CPT | Mod: CPTII,S$GLB,, | Performed by: FAMILY MEDICINE

## 2023-11-13 PROCEDURE — 3061F PR NEG MICROALBUMINURIA RESULT DOCUMENTED/REVIEW: ICD-10-PCS | Mod: CPTII,S$GLB,, | Performed by: FAMILY MEDICINE

## 2023-11-13 PROCEDURE — 3079F PR MOST RECENT DIASTOLIC BLOOD PRESSURE 80-89 MM HG: ICD-10-PCS | Mod: CPTII,S$GLB,, | Performed by: FAMILY MEDICINE

## 2023-11-13 PROCEDURE — 1159F PR MEDICATION LIST DOCUMENTED IN MEDICAL RECORD: ICD-10-PCS | Mod: CPTII,S$GLB,, | Performed by: FAMILY MEDICINE

## 2023-11-13 PROCEDURE — 3075F SYST BP GE 130 - 139MM HG: CPT | Mod: CPTII,S$GLB,, | Performed by: FAMILY MEDICINE

## 2023-11-13 PROCEDURE — 99214 PR OFFICE/OUTPT VISIT, EST, LEVL IV, 30-39 MIN: ICD-10-PCS | Mod: S$GLB,,, | Performed by: FAMILY MEDICINE

## 2023-11-13 PROCEDURE — 3075F PR MOST RECENT SYSTOLIC BLOOD PRESS GE 130-139MM HG: ICD-10-PCS | Mod: CPTII,S$GLB,, | Performed by: FAMILY MEDICINE

## 2023-11-13 RX ORDER — HYDROCODONE BITARTRATE AND ACETAMINOPHEN 10; 325 MG/1; MG/1
1 TABLET ORAL EVERY 6 HOURS PRN
Qty: 90 TABLET | Refills: 0 | Status: SHIPPED | OUTPATIENT
Start: 2024-01-13

## 2023-11-13 RX ORDER — HYDROCODONE BITARTRATE AND ACETAMINOPHEN 10; 325 MG/1; MG/1
1 TABLET ORAL EVERY 6 HOURS PRN
Qty: 90 TABLET | Refills: 0 | Status: SHIPPED | OUTPATIENT
Start: 2023-11-13

## 2023-11-13 RX ORDER — HYDROCODONE BITARTRATE AND ACETAMINOPHEN 10; 325 MG/1; MG/1
1 TABLET ORAL EVERY 6 HOURS PRN
Qty: 90 TABLET | Refills: 0 | Status: SHIPPED | OUTPATIENT
Start: 2023-12-13 | End: 2024-01-15 | Stop reason: SDUPTHER

## 2023-11-13 RX ORDER — ATORVASTATIN CALCIUM 40 MG/1
40 TABLET, FILM COATED ORAL DAILY
Qty: 90 TABLET | Refills: 3 | Status: SHIPPED | OUTPATIENT
Start: 2023-11-13 | End: 2024-03-20 | Stop reason: SDUPTHER

## 2023-11-13 RX ORDER — ALPRAZOLAM 1 MG/1
1 TABLET ORAL DAILY
Qty: 30 TABLET | Refills: 2 | Status: SHIPPED | OUTPATIENT
Start: 2023-11-13 | End: 2024-02-12 | Stop reason: SDUPTHER

## 2023-11-13 RX ORDER — DULOXETIN HYDROCHLORIDE 60 MG/1
60 CAPSULE, DELAYED RELEASE ORAL DAILY
Qty: 90 CAPSULE | Refills: 3 | Status: SHIPPED | OUTPATIENT
Start: 2023-11-13

## 2023-11-13 RX ORDER — HYDROCHLOROTHIAZIDE 25 MG/1
25 TABLET ORAL DAILY
Qty: 90 TABLET | Refills: 3 | Status: SHIPPED | OUTPATIENT
Start: 2023-11-13 | End: 2024-03-11 | Stop reason: SDUPTHER

## 2023-11-13 RX ORDER — FLUTICASONE PROPIONATE 50 MCG
1 SPRAY, SUSPENSION (ML) NASAL
Qty: 16 G | Refills: 3 | Status: SHIPPED | OUTPATIENT
Start: 2023-11-13

## 2023-11-13 RX ORDER — LISINOPRIL 10 MG/1
10 TABLET ORAL DAILY
Qty: 90 TABLET | Refills: 3 | Status: CANCELLED | OUTPATIENT
Start: 2023-11-13 | End: 2024-11-12

## 2023-11-13 RX ORDER — LOSARTAN POTASSIUM 50 MG/1
50 TABLET ORAL DAILY
Qty: 90 TABLET | Refills: 3 | Status: SHIPPED | OUTPATIENT
Start: 2023-11-13 | End: 2024-02-23 | Stop reason: SDUPTHER

## 2023-11-14 ENCOUNTER — PATIENT MESSAGE (OUTPATIENT)
Dept: FAMILY MEDICINE | Facility: CLINIC | Age: 63
End: 2023-11-14

## 2023-11-15 PROBLEM — R05.8 ACE-INHIBITOR COUGH: Status: ACTIVE | Noted: 2023-11-15

## 2023-11-15 PROBLEM — E78.2 MIXED HYPERLIPIDEMIA: Status: ACTIVE | Noted: 2023-11-15

## 2023-11-15 PROBLEM — M51.9 LUMBAR DISC DISEASE: Status: ACTIVE | Noted: 2023-11-15

## 2023-11-15 PROBLEM — T46.4X5A ACE-INHIBITOR COUGH: Status: ACTIVE | Noted: 2023-11-15

## 2023-11-15 PROBLEM — M51.9 THORACIC DISC DISEASE: Status: ACTIVE | Noted: 2023-11-15

## 2023-11-15 LAB
1OH-MIDAZOLAM UR-MCNC: NEGATIVE NG/ML
7AMINOCLONAZEPAM UR-MCNC: 44 NG/ML
A-OH ALPRAZ UR-MCNC: 137 NG/ML
A-OH-TRIAZOLAM UR-MCNC: NEGATIVE NG/ML
AMPHETAMINES UR QL: NEGATIVE NG/ML
BARBITURATES UR QL: NEGATIVE NG/ML
BENZODIAZ UR QL: POSITIVE NG/ML
BZE UR QL: NEGATIVE NG/ML
CODEINE UR-MCNC: NEGATIVE NG/ML
CREAT UR-MCNC: 68.8 MG/DL
DRUG SCREEN COMMENT UR-IMP: ABNORMAL
DRUG SCREEN COMMENT UR-IMP: ABNORMAL
HYDROCODONE UR-MCNC: 665 NG/ML
HYDROMORPHONE UR-MCNC: 131 NG/ML
LORAZEPAM UR-MCNC: NEGATIVE NG/ML
METHADONE UR QL: NEGATIVE NG/ML
MORPHINE UR-MCNC: NEGATIVE NG/ML
NORDIAZEPAM UR-MCNC: NEGATIVE NG/ML
NORHYDROCODONE UR CFM-MCNC: 606 NG/ML
NOTES AND COMMENTS: ABNORMAL
OH-ETHYLFLURAZ UR-MCNC: NEGATIVE NG/ML
OPIATES UR QL: POSITIVE NG/ML
OXAZEPAM UR-MCNC: NEGATIVE NG/ML
OXIDANTS UR QL: NEGATIVE MCG/ML
OXYCODONE UR QL: NEGATIVE NG/ML
PCP UR QL: NEGATIVE NG/ML
PH UR: 6.7 [PH] (ref 4.5–9)
TEMAZEPAM UR-MCNC: NEGATIVE NG/ML

## 2023-11-16 NOTE — PROGRESS NOTES
SUBJECTIVE:    Patient ID: Patrick Wilson is a 63 y.o. male.    Chief Complaint: Fall (Pt fall 2 times last week, brought bottles, loss balance, need refills, UDS ordered, low back pain, abc )    63-year-old male here for his 3 month visit.  He complains of low back pain, he trips and falls occasionally.  This is due to his spasticity from cerebral palsy.    He saw Dr. Bhatt spine surgeon who performed a thoracic MRI.  Has right thoracic and flank pains that are quite aggravating.  He was found have disc disease at T7-8 and T10-11.  He complains of left leg and left arm weakness a tightness and a pulling sensation from his cerebral palsy.    He does not smoke, he has decreased his beer intake recently.    2023-colonoscopy with Dr. Minaya[facility] 1 year due to poor bowel prep.    Urology Dr. Jacinto, has performed a UroLift in the past.  He had a TURP done in 2021.  Complains of erectile dysfunction and some fecal incontinence.    Hyperlipidemia, on atorvastatin 40 mg daily.    Complains of a dry hacking cough and is taking lisinopril.        Office Visit on 11/13/2023   Component Date Value Ref Range Status    Amphetamines 11/13/2023 NEGATIVE  <500 ng/mL Final    Barbiturates 11/13/2023 NEGATIVE  <300 ng/mL Final    Benzodiazepines 11/13/2023 POSITIVE (A)  <100 ng/mL Final    Alphahydroxyalprazolam 11/13/2023 137 (H)  <25 ng/mL Final    Alphahydroxymidazolam 11/13/2023 NEGATIVE  <50 ng/mL Final    Alphahydroxytriazolam 11/13/2023 NEGATIVE  <50 ng/mL Final    Aminoclonazepam 11/13/2023 44 (H)  <25 ng/mL Final    hydroxyethylflurazepam UR GC/MS 11/13/2023 NEGATIVE  <50 ng/mL Final    Lorazepam 11/13/2023 NEGATIVE  <50 ng/mL Final    Nordiazepam Lvl 11/13/2023 NEGATIVE  <50 ng/mL Final    Oxazepam 11/13/2023 NEGATIVE  <50 ng/mL Final    Temazepam GC/MS Conf 11/13/2023 NEGATIVE  <50 ng/mL Final    Benzodiazepines Comments 11/13/2023    Final    Cocaine Metabolites 11/13/2023 NEGATIVE  <150 ng/mL Final     Methadone 11/13/2023 NEGATIVE  <100 ng/mL Final    Opiates 11/13/2023 POSITIVE (A)  <100 ng/mL Final    Codeine 11/13/2023 NEGATIVE  <50 ng/mL Final    Hydrocodone 11/13/2023 665 (H)  <50 ng/mL Final    Hydromorphone 11/13/2023 131 (H)  <50 ng/mL Final    Morphine 11/13/2023 NEGATIVE  <50 ng/mL Final    NORHYDROCODONE 11/13/2023 606 (H)  <50 ng/mL Final    Opiates Comments 11/13/2023    Final    Oxycodone 11/13/2023 NEGATIVE  <100 ng/mL Final    Phencyclidine 11/13/2023 NEGATIVE  <25 ng/mL Final    Creatinine 11/13/2023 68.8  > or = 20.0 mg/dL Final    pH 11/13/2023 6.7  4.5 - 9.0 Final    Oxidants, Urine (Tox) 11/13/2023 NEGATIVE  <200 mcg/mL Final    Notes and Comments 11/13/2023    Final   Orders Only on 07/26/2023   Component Date Value Ref Range Status    CRC Recommendation External 07/24/2023 Repeat colonoscopy in 1 year   Final       Past Medical History:   Diagnosis Date    Anticoagulant long-term use     Anxiety     Arthritis     Back pain     Cerebral palsy     Depression     GERD (gastroesophageal reflux disease)     Hypertension     Peptic ulcer of stomach     Seizures     AS A CHILD    Wears glasses     CONTACS     Social History     Socioeconomic History    Marital status:    Tobacco Use    Smoking status: Some Days     Types: Pipe     Passive exposure: Current    Smokeless tobacco: Never    Tobacco comments:     occasional.   Substance and Sexual Activity    Alcohol use: Yes     Comment: occasional    Drug use: No    Sexual activity: Yes     Partners: Female     Past Surgical History:   Procedure Laterality Date    ANKLE SURGERY Left     BACK SURGERY      CYSTOSCOPY N/A 7/17/2018    Procedure: CYSTOSCOPY;  Surgeon: Jonathan Jacinto MD;  Location: [facility] OR;  Service: Urology;  Laterality: N/A;    CYSTOSCOPY N/A 4/6/2021    Procedure: CYSTOSCOPY;  Surgeon: Jonathan Jacinto MD;  Location: [facility] OR;  Service: Urology;  Laterality: N/A;    CYSTOSCOPY WITH INSERTION OF MINIMALLY INVASIVE IMPLANT TO  ENLARGE PROSTATIC URETHRA N/A 8/2/2018    Procedure: CYSTOSCOPY, WITH INSERTION OF UROLIFT IMPLANT;  Surgeon: Jonathan Jacinto MD;  Location: [facility] OR;  Service: Urology;  Laterality: N/A;    LEG SURGERY Left     SHOULDER SURGERY Right     TRANSRECTAL ULTRASOUND EXAMINATION N/A 7/17/2018    Procedure: ULTRASOUND, TRANSRECTAL;  Surgeon: Jonathan Jacinto MD;  Location: [facility] OR;  Service: Urology;  Laterality: N/A;    TRANSURETHRAL RESECTION OF PROSTATE N/A 5/27/2021    Procedure: TURP (TRANSURETHRAL RESECTION OF PROSTATE);  Surgeon: Jonathan Jacinto MD;  Location: [facility] OR;  Service: Urology;  Laterality: N/A;     Family History   Problem Relation Age of Onset    Melanoma Father        The 10-year CVD risk score (Arleth, et al., 2008) is: 41.6%    Values used to calculate the score:      Age: 63 years      Sex: Male      Diabetic: No      Tobacco smoker: Yes      Systolic Blood Pressure: 138 mmHg      Is BP treated: Yes      HDL Cholesterol: 54 mg/dL      Total Cholesterol: 235 mg/dL    Tests to Keep You Healthy    Colon Cancer Screening: Met on 7/24/2023  Last Blood Pressure <= 139/89 (11/13/2023): Yes  Tobacco Cessation: NO      Review of patient's allergies indicates:   Allergen Reactions    Tylox [oxycodone-acetaminophen] Nausea And Vomiting       Current Outpatient Medications:     amLODIPine (NORVASC) 10 MG tablet, Take 1 tablet (10 mg total) by mouth once daily., Disp: 90 tablet, Rfl: 3    aspirin (ECOTRIN) 81 MG EC tablet, Take 81 mg by mouth once daily., Disp: , Rfl:     augmented betamethasone dipropionate (DIPROLENE-AF) 0.05 % cream, Use on AA of hand twice daily., Disp: 50 g, Rfl: 1    cyclobenzaprine (FLEXERIL) 5 MG tablet, Take 1 tablet (5 mg total) by mouth 3 (three) times daily as needed., Disp: 90 tablet, Rfl: 2    D3/E/Se/soy isofl/tocoph/lycop (PROSTATE 2.4 ORAL), Take by mouth., Disp: , Rfl:     HYDROcodone-acetaminophen (NORCO)  mg per tablet, Take 1 tablet by mouth every 6 (six)  hours as needed for Pain., Disp: 90 tablet, Rfl: 0    [START ON 11/16/2023] HYDROcodone-acetaminophen (NORCO)  mg per tablet, Take 1 tablet by mouth every 6 (six) hours as needed for Pain., Disp: 90 tablet, Rfl: 0    HYDROcodone-acetaminophen (NORCO)  mg per tablet, Take 1 tablet by mouth every 6 (six) hours as needed for Pain., Disp: 90 tablet, Rfl: 0    KRILL OIL ORAL, Take by mouth., Disp: , Rfl:     lisinopriL 10 MG tablet, Take 1 tablet (10 mg total) by mouth once daily., Disp: 90 tablet, Rfl: 1    multivit-min/folic/vit K/lycop (MEN'S 50 PLUS MULTIVITAMIN ORAL), Take 1 tablet by mouth once daily., Disp: , Rfl:     pantoprazole (PROTONIX) 40 MG tablet, Take 1 tablet (40 mg total) by mouth once daily., Disp: 90 tablet, Rfl: 1    sildenafiL (VIAGRA) 100 MG tablet, Take 1 tablet (100 mg total) by mouth as needed for Erectile Dysfunction., Disp: 6 tablet, Rfl: 6    SUTAB 1.479-0.188- 0.225 gram tablet, Take by mouth., Disp: , Rfl:     triamcinolone acetonide 0.025% (KENALOG) 0.025 % cream, Apply topically 2 (two) times daily., Disp: 80 g, Rfl: 0    ALPRAZolam (XANAX) 1 MG tablet, Take 1 tablet (1 mg total) by mouth once daily., Disp: 30 tablet, Rfl: 2    atorvastatin (LIPITOR) 40 MG tablet, Take 1 tablet (40 mg total) by mouth once daily., Disp: 90 tablet, Rfl: 3    cetirizine (ZYRTEC) 10 MG tablet, Take 1 tablet (10 mg total) by mouth once daily., Disp: 90 tablet, Rfl: 1    clobetasoL (TEMOVATE) 0.05 % cream, Apply topically 2 (two) times daily. for 7 days, Disp: 45 g, Rfl: 0    DULoxetine (CYMBALTA) 60 MG capsule, Take 1 capsule (60 mg total) by mouth once daily., Disp: 90 capsule, Rfl: 3    fluticasone propionate (FLONASE) 50 mcg/actuation nasal spray, 1 spray (50 mcg total) by Each Nostril route as needed., Disp: 16 g, Rfl: 3    gabapentin (NEURONTIN) 300 MG capsule, Take 3 capsules (900 mg total) by mouth 3 (three) times daily., Disp: 810 capsule, Rfl: 1    hydroCHLOROthiazide (HYDRODIURIL) 25 MG  tablet, Take 1 tablet (25 mg total) by mouth once daily., Disp: 90 tablet, Rfl: 3    HYDROcodone-acetaminophen (NORCO)  mg per tablet, Take 1 tablet by mouth every 6 (six) hours as needed for Pain., Disp: 90 tablet, Rfl: 0    HYDROcodone-acetaminophen (NORCO)  mg per tablet, Take 1 tablet by mouth every 6 (six) hours as needed for Pain., Disp: 90 tablet, Rfl: 0    [START ON 12/13/2023] HYDROcodone-acetaminophen (NORCO)  mg per tablet, Take 1 tablet by mouth every 6 (six) hours as needed for Pain., Disp: 90 tablet, Rfl: 0    [START ON 1/13/2024] HYDROcodone-acetaminophen (NORCO)  mg per tablet, Take 1 tablet by mouth every 6 (six) hours as needed for Pain., Disp: 90 tablet, Rfl: 0    HYDROcodone-acetaminophen (NORCO)  mg per tablet, Take 1 tablet by mouth every 6 (six) hours as needed for Pain., Disp: 90 tablet, Rfl: 0    losartan (COZAAR) 50 MG tablet, Take 1 tablet (50 mg total) by mouth once daily., Disp: 90 tablet, Rfl: 3    oxybutynin (DITROPAN-XL) 10 MG 24 hr tablet, Take 1 tablet (10 mg total) by mouth once daily., Disp: 30 tablet, Rfl: 11    tamsulosin (FLOMAX) 0.4 mg Cap, Take 1 capsule (0.4 mg total) by mouth every evening., Disp: 30 capsule, Rfl: 11  No current facility-administered medications for this visit.    Facility-Administered Medications Ordered in Other Visits:     ampicillin 2 g in sodium chloride 0.9 % 100 mL IVPB (ready to mix system), 2 g, Intravenous, On Call Procedure, Jonathan Jacinto MD, 2 g at 05/27/21 1234    Review of Systems   Constitutional:  Negative for appetite change, chills, fatigue, fever and unexpected weight change.   HENT:  Negative for ear pain and trouble swallowing.    Eyes:  Negative for pain, discharge and visual disturbance.   Respiratory:  Positive for cough (dry hacking cough on lisinopril). Negative for apnea, shortness of breath and wheezing.    Cardiovascular:  Negative for chest pain and leg swelling.   Gastrointestinal:  Negative  "for abdominal pain, blood in stool, constipation, diarrhea, nausea, vomiting and reflux.   Endocrine: Negative for cold intolerance, heat intolerance and polydipsia.   Genitourinary:  Positive for erectile dysfunction. Negative for bladder incontinence, dysuria, frequency, hematuria, testicular pain and urgency.   Musculoskeletal:  Negative for gait problem, joint swelling and myalgias.   Neurological:  Negative for dizziness, seizures and numbness.   Psychiatric/Behavioral:  Negative for agitation, behavioral problems and hallucinations. The patient is not nervous/anxious.            Objective:      Vitals:    11/13/23 1106   BP: 138/88   Pulse: 100   Weight: 106.1 kg (234 lb)   Height: 5' 9" (1.753 m)     Physical Exam  Vitals and nursing note reviewed.   Constitutional:       General: He is not in acute distress.     Appearance: He is well-developed. He is obese. He is not toxic-appearing.   HENT:      Head: Normocephalic and atraumatic.      Right Ear: Tympanic membrane and external ear normal.      Left Ear: Tympanic membrane and external ear normal.      Nose: Nose normal.      Mouth/Throat:      Pharynx: Oropharynx is clear.   Eyes:      Pupils: Pupils are equal, round, and reactive to light.   Neck:      Thyroid: No thyromegaly.      Vascular: No carotid bruit.   Cardiovascular:      Rate and Rhythm: Normal rate and regular rhythm.      Heart sounds: Normal heart sounds. No murmur heard.  Pulmonary:      Effort: Pulmonary effort is normal.      Breath sounds: Normal breath sounds. No wheezing or rales.   Abdominal:      General: Bowel sounds are normal. There is no distension.      Palpations: Abdomen is soft.      Tenderness: There is no abdominal tenderness.   Musculoskeletal:         General: No tenderness or deformity. Normal range of motion.      Cervical back: Normal range of motion and neck supple.      Lumbar back: Normal. No spasms.      Comments: Bends 60 degrees at  waist, tender to palpation of " the thoracic spine and right-sided ribs.  Shoulders and knees good range of motion without crepitance.  No pitting edema to lower extremities   Lymphadenopathy:      Cervical: No cervical adenopathy.   Skin:     General: Skin is warm and dry.      Findings: No rash.   Neurological:      Mental Status: He is alert and oriented to person, place, and time.      Cranial Nerves: No cranial nerve deficit.      Motor: Weakness (left-sided weakness and spasticity due to cerebral palsy) present.      Coordination: Coordination normal.   Psychiatric:         Mood and Affect: Mood normal.         Behavior: Behavior normal.         Thought Content: Thought content normal.         Judgment: Judgment normal.           Assessment:       1. Essential hypertension    2. Hyperlipidemia, unspecified hyperlipidemia type    3. Non-seasonal allergic rhinitis, unspecified trigger    4. Essential hypertension    5. Anxiety disorder, unspecified type    6. Encounter for long-term (current) use of other medications    7. Encounter for therapeutic drug monitoring    8. Back pain, unspecified back location, unspecified back pain laterality, unspecified chronicity    9. Other male erectile dysfunction    10. Chronic pain syndrome    11. ACE-inhibitor cough    12. BPH with obstruction/lower urinary tract symptoms    13. Gastroesophageal reflux disease without esophagitis    14. Infantile cerebral palsy    15. Mixed hyperlipidemia    16. Thoracic disc disease    17. Lumbar disc disease         Plan:       Essential hypertension  -     hydroCHLOROthiazide (HYDRODIURIL) 25 MG tablet; Take 1 tablet (25 mg total) by mouth once daily.  Dispense: 90 tablet; Refill: 3  -     losartan (COZAAR) 50 MG tablet; Take 1 tablet (50 mg total) by mouth once daily.  Dispense: 90 tablet; Refill: 3  Blood pressure well Controlled  Hyperlipidemia, unspecified hyperlipidemia type  Comments:  Refills today  Orders:  -     atorvastatin (LIPITOR) 40 MG tablet; Take 1  tablet (40 mg total) by mouth once daily.  Dispense: 90 tablet; Refill: 3  Mild elevation of cholesterol.  Cholesterol 235 HDL 54  , cut back on fried foods and fast food.  Non-seasonal allergic rhinitis, unspecified trigger  -     fluticasone propionate (FLONASE) 50 mcg/actuation nasal spray; 1 spray (50 mcg total) by Each Nostril route as needed.  Dispense: 16 g; Refill: 3    Essential hypertension    Orders:  -     hydroCHLOROthiazide (HYDRODIURIL) 25 MG tablet; Take 1 tablet (25 mg total) by mouth once daily.  Dispense: 90 tablet; Refill: 3  -     losartan (COZAAR) 50 MG tablet; Take 1 tablet (50 mg total) by mouth once daily.  Dispense: 90 tablet; Refill: 3    Anxiety disorder, unspecified type  Comments:  mood and anxiety appear to be stable at this time. continue as is.  Orders:  -     DULoxetine (CYMBALTA) 60 MG capsule; Take 1 capsule (60 mg total) by mouth once daily.  Dispense: 90 capsule; Refill: 3  -     ALPRAZolam (XANAX) 1 MG tablet; Take 1 tablet (1 mg total) by mouth once daily.  Dispense: 30 tablet; Refill: 2    Encounter for long-term (current) use of other medications  -     DRUG MONITOR, PANEL 4, W/CONF, URINE; Future; Expected date: 11/13/2023    Encounter for therapeutic drug monitoring  -     DRUG MONITOR, PANEL 4, W/CONF, URINE; Future; Expected date: 11/13/2023    Back pain, unspecified back location, unspecified back pain laterality, unspecified chronicity  -     DRUG MONITOR, PANEL 4, W/CONF, URINE; Future; Expected date: 11/13/2023    Other male erectile dysfunction  -     Ambulatory referral/consult to Urology; Future; Expected date: 11/20/2023  Refer to Dr. Jacinto due to erectile dysfunction  Chronic pain syndrome  -     HYDROcodone-acetaminophen (NORCO)  mg per tablet; Take 1 tablet by mouth every 6 (six) hours as needed for Pain.  Dispense: 90 tablet; Refill: 0  -     HYDROcodone-acetaminophen (NORCO)  mg per tablet; Take 1 tablet by mouth every 6 (six) hours  as needed for Pain.  Dispense: 90 tablet; Refill: 0  -     HYDROcodone-acetaminophen (NORCO)  mg per tablet; Take 1 tablet by mouth every 6 (six) hours as needed for Pain.  Dispense: 90 tablet; Refill: 0  Pain medication refill.  ACE-inhibitor cough  Discontinue lisinopril, add losartan 50 mg daily  BPH with obstruction/lower urinary tract symptoms    Gastroesophageal reflux disease without esophagitis    Infantile cerebral palsy    Mixed hyperlipidemia    Thoracic disc disease  Could consider a nerve block with pain management if he desires.  Lumbar disc disease      Follow up in about 3 months (around 2/13/2024), or Osmani-.        11/15/2023 Jonathan Chacon

## 2023-11-17 ENCOUNTER — LAB VISIT (OUTPATIENT)
Dept: LAB | Facility: HOSPITAL | Age: 63
End: 2023-11-17
Attending: NURSE PRACTITIONER
Payer: MEDICARE

## 2023-11-17 ENCOUNTER — OFFICE VISIT (OUTPATIENT)
Dept: UROLOGY | Facility: CLINIC | Age: 63
End: 2023-11-17
Payer: MEDICARE

## 2023-11-17 DIAGNOSIS — N52.8 OTHER MALE ERECTILE DYSFUNCTION: ICD-10-CM

## 2023-11-17 DIAGNOSIS — R35.0 BENIGN PROSTATIC HYPERPLASIA WITH URINARY FREQUENCY: Primary | ICD-10-CM

## 2023-11-17 DIAGNOSIS — N40.1 BENIGN PROSTATIC HYPERPLASIA WITH URINARY FREQUENCY: ICD-10-CM

## 2023-11-17 DIAGNOSIS — N40.1 BENIGN PROSTATIC HYPERPLASIA WITH URINARY FREQUENCY: Primary | ICD-10-CM

## 2023-11-17 DIAGNOSIS — R35.0 BENIGN PROSTATIC HYPERPLASIA WITH URINARY FREQUENCY: ICD-10-CM

## 2023-11-17 LAB — POC RESIDUAL URINE VOLUME: 69 ML (ref 0–100)

## 2023-11-17 PROCEDURE — 99999 PR PBB SHADOW E&M-EST. PATIENT-LVL V: ICD-10-PCS | Mod: PBBFAC,,, | Performed by: NURSE PRACTITIONER

## 2023-11-17 PROCEDURE — 3066F NEPHROPATHY DOC TX: CPT | Mod: CPTII,S$GLB,, | Performed by: NURSE PRACTITIONER

## 2023-11-17 PROCEDURE — 1160F RVW MEDS BY RX/DR IN RCRD: CPT | Mod: CPTII,S$GLB,, | Performed by: NURSE PRACTITIONER

## 2023-11-17 PROCEDURE — 84154 ASSAY OF PSA FREE: CPT | Performed by: NURSE PRACTITIONER

## 2023-11-17 PROCEDURE — 99999 PR PBB SHADOW E&M-EST. PATIENT-LVL V: CPT | Mod: PBBFAC,,, | Performed by: NURSE PRACTITIONER

## 2023-11-17 PROCEDURE — 3066F PR DOCUMENTATION OF TREATMENT FOR NEPHROPATHY: ICD-10-PCS | Mod: CPTII,S$GLB,, | Performed by: NURSE PRACTITIONER

## 2023-11-17 PROCEDURE — 1160F PR REVIEW ALL MEDS BY PRESCRIBER/CLIN PHARMACIST DOCUMENTED: ICD-10-PCS | Mod: CPTII,S$GLB,, | Performed by: NURSE PRACTITIONER

## 2023-11-17 PROCEDURE — 1159F MED LIST DOCD IN RCRD: CPT | Mod: CPTII,S$GLB,, | Performed by: NURSE PRACTITIONER

## 2023-11-17 PROCEDURE — 99214 PR OFFICE/OUTPT VISIT, EST, LEVL IV, 30-39 MIN: ICD-10-PCS | Mod: S$GLB,,, | Performed by: NURSE PRACTITIONER

## 2023-11-17 PROCEDURE — 36415 COLL VENOUS BLD VENIPUNCTURE: CPT | Performed by: NURSE PRACTITIONER

## 2023-11-17 PROCEDURE — 99214 OFFICE O/P EST MOD 30 MIN: CPT | Mod: S$GLB,,, | Performed by: NURSE PRACTITIONER

## 2023-11-17 PROCEDURE — 51798 POCT BLADDER SCAN: ICD-10-PCS | Mod: S$GLB,,, | Performed by: NURSE PRACTITIONER

## 2023-11-17 PROCEDURE — 3061F PR NEG MICROALBUMINURIA RESULT DOCUMENTED/REVIEW: ICD-10-PCS | Mod: CPTII,S$GLB,, | Performed by: NURSE PRACTITIONER

## 2023-11-17 PROCEDURE — 4010F ACE/ARB THERAPY RXD/TAKEN: CPT | Mod: CPTII,S$GLB,, | Performed by: NURSE PRACTITIONER

## 2023-11-17 PROCEDURE — 1159F PR MEDICATION LIST DOCUMENTED IN MEDICAL RECORD: ICD-10-PCS | Mod: CPTII,S$GLB,, | Performed by: NURSE PRACTITIONER

## 2023-11-17 PROCEDURE — 4010F PR ACE/ARB THEARPY RXD/TAKEN: ICD-10-PCS | Mod: CPTII,S$GLB,, | Performed by: NURSE PRACTITIONER

## 2023-11-17 PROCEDURE — 51798 US URINE CAPACITY MEASURE: CPT | Mod: S$GLB,,, | Performed by: NURSE PRACTITIONER

## 2023-11-17 PROCEDURE — 3061F NEG MICROALBUMINURIA REV: CPT | Mod: CPTII,S$GLB,, | Performed by: NURSE PRACTITIONER

## 2023-11-17 RX ORDER — TAMSULOSIN HYDROCHLORIDE 0.4 MG/1
0.4 CAPSULE ORAL DAILY
Qty: 90 CAPSULE | Refills: 4 | Status: ON HOLD | OUTPATIENT
Start: 2023-11-17 | End: 2024-02-27 | Stop reason: HOSPADM

## 2023-11-17 NOTE — PROGRESS NOTES
[facility] Note  Staff: SHABNAM Coe-C    PCP: LAINA Chacon  Urologist:  LAINA Jacinto    Chief Complaint: F/UP-LUTS worsening; BPH S/P TURP (2021) and erectile dysfunction issues.    Subjective:        HPI: Patrick Wilson is a 63 y.o. male presents in office today for evaluation of worsening LUTS and ED at this time.    Pt was last evaluated by Dr. Jacinto in 10/2021 for BPH S/P TURP procedure.    63-year-old man with long history of BPH refractory to alpha blockers found to have 28.2g obstructing prostate with lateral lobe obstruction for which he had urolift in 8/2/18 with good initial response and decrease in symptom score from 26/6 to 3/2. Lost to follow up afte rmay 2019 and recently returned to primary care noting recurrence of symptoms, and the subjectively better than preoperatively, still significant severe LUTS with AUA symptom score 23-24/4 though emptying well with a PVR of 29 cc.  Significant weak stream but also significant progression of urgency and frequency again.  Repeat cystoscopic inspection found good lateral retraction proximally and distally from prior implant placement but some recurrent proximal obstruction under the level of implants from lateral lobe tissue but more so from anterior ingrowth obstructing tissue at the anterior bladder neck.  After extensive discussion of management options, patient elected to proceed with transurethral resection of prostate     5/27/21 TURP/TUEVP: Moderate lateral lobe tissue ingrowth under the level the previously placed anterior implants, as well as significant anterolateral obstructing tissue proximally.  Button TURP performed to vaporize all intervening tissue and obstructing tissue, only exposing mucosalized endplate of 1 of 4 prior implants without need to resect it or remove it  - meatal and fossa navicular narrowing requiring Gilma calibration to 30 Romansh to pass instruments     6/17/21: AUASS: 21/6, PVR  19cc (emptying- 5; Frequency- 5; Intermittency- 3; Urgency- 0; Weak Stream- 5; Straining- 0; Sleeping-3)     21: AUA SS:  14/5 (4:  Sleeping; 3:  Urgency, weak stream; 2:  Emptying, frequency). Severe urgency with UUI. PVR 16cc  No constipation, daily soft bm despite 1-2 hydrocodone daily. ++ snoring. No evaluation for MELINA prior. No split stream or spraying. Occ dribbles.   2 cups AM coffee, tries to drink some water. Water and lemonade in evening. Tries to stop at least 2h before bed  Small volume voids, usually no more than 100ml. DTF 4-5x, all with urgency, nocturia 3-4x with urge waking him up   2/5 no urgency bc trying to timed void. Daily UUI. Did not stop flomax 1 week after but did 1 month after not much change in frequency stopping.  Nocturnal urgency with UUI all voids waking up from sleep. Pad at night. Mostly no pad during day.  - started ditropan 10XL, of note did need meatal calibration at time of turp     OV 10/12/2021:  AUA SS: 10- (3-4 nocturia; 3 weak stream; 2: frequency, urgency)  Bowels not moving very well.   DTF 4x, though NTF 3-4x.    No longer having to pull over to urinate. Very rare FRANK  Pain/urge wakes him up at night to urinate.   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 Still taking pain meds.   ++ Snoring. Has never had a sleep study  Has been minimizing fluid intake in evening    TODAY:  UA in office today-Pt was unable to urinate today.  PVR of 69 mL  No gross hematuria  No dysuria    ED-pt has tried Viagra and Cialis in the past and unable to take p.o. meds due to headaches as SE.    AUA SS Today:  29/  Feeling of ICBE:  5  Frequency:3  Intermittency:4  Urgency:4  Weak urine stream:5  Strainin  Nocturia:3    DOM  Score: 5 Severe ED symptoms  1  1  1  1  1    REVIEW OF SYSTEMS:  A comprehensive 10 system review was performed and is negative except as noted above in HPI    PMHx:  Past Medical History:   Diagnosis Date    Anticoagulant long-term use     Anxiety     Arthritis     Back pain     Cerebral palsy     Depression     GERD (gastroesophageal reflux disease)     Hypertension     Peptic ulcer of stomach     Seizures     AS A CHILD    Wears glasses     CONTACS     PSHx:  Past Surgical History:   Procedure Laterality Date    ANKLE SURGERY Left     BACK SURGERY      CYSTOSCOPY N/A 7/17/2018    Procedure: CYSTOSCOPY;  Surgeon: Jonathan Jacinto MD;  Location: [facility] OR;  Service: Urology;  Laterality: N/A;    CYSTOSCOPY N/A 4/6/2021    Procedure: CYSTOSCOPY;  Surgeon: Jonathan Jacinto MD;  Location: [facility] OR;  Service: Urology;  Laterality: N/A;    CYSTOSCOPY WITH INSERTION OF MINIMALLY INVASIVE IMPLANT TO ENLARGE PROSTATIC URETHRA N/A 8/2/2018    Procedure: CYSTOSCOPY, WITH INSERTION OF UROLIFT IMPLANT;  Surgeon: Jonathan Jacinto MD;  Location: [facility] OR;  Service: Urology;  Laterality: N/A;    LEG SURGERY Left     SHOULDER SURGERY Right     TRANSRECTAL ULTRASOUND EXAMINATION N/A 7/17/2018    Procedure: ULTRASOUND, TRANSRECTAL;  Surgeon: Jonathan Jacinto MD;  Location: [facility] OR;  Service: Urology;  Laterality: N/A;    TRANSURETHRAL RESECTION OF PROSTATE N/A 5/27/2021    Procedure: TURP (TRANSURETHRAL RESECTION OF PROSTATE);  Surgeon: Jonathan Jacinto MD;  Location: [facility] OR;  Service: Urology;  Laterality: N/A;     Allergies:  Tylox [oxycodone-acetaminophen]    Medications: reviewed   Objective:   There were no vitals filed for this visit.    General:WDWN in NAD  Eyes: PERRLA, normal conjunctiva  Respiratory: no increased work on breathing, clear to auscultation  Cardiovascular: regular rate and rhythm. No obvious extremity edema.  GI: palpation of masses. No tenderness. No hepatosplenomegaly to palpation.  Musculoskeletal:  normal range of motion of bilateral upper extremities. Normal muscle strength and tone.  Skin: no obvious rashes or lesions. No tightening of skin noted.  Neurologic: CN grossly normal. Normal sensation.   Psychiatric: awake, alert and oriented x 3. Mood and affect normal. Cooperative.     Exam performed by me in OV today:  30-35g enlarged, smooth prostate exam  No masses, no nodules or tenderness upon exam today.    Assessment:       1. Benign prostatic hyperplasia with urinary frequency    2. Other male erectile dysfunction          Plan:   LUTS s/p TURP, ED issues    Overdue for PSA, Total and Free-to be scheduled.    Restart Flomax 0.4 mg one capsule daily at this time for his current LUTS.  The med prescribed to pt today as trial to see if med improves pt's current LUTS.  Benefits, risks and side affects were thoroughly explained to pt today in office with all questions answered.    Discussed conservative measures to control urgency and frequency including avoiding bladder irritants, bladder timed voiding, not postponing voiding, and bowel regimen (as distended bowel has extrinsic compressive effect on bladder. Discussed bladder irritants include coffe (even decaf), tea, alcohol, soda, spicy foods, acidic juices (orange, tomato), vinegar, and artificial sweeteners.     F/u--It was thoroughly explained to pt upon conclusion of ov today that we will forward all ov notes to MD to review in order to determine suitable further workup and POC at this time.  Pt more than likely will need a repeat Cystoscopy and/or TRUS or both in the near future to evaluate his symptoms.  Pt verbalized understanding.    [facility]: Active    Raysa Weller, [place]

## 2023-11-18 ENCOUNTER — TELEPHONE (OUTPATIENT)
Dept: UROLOGY | Facility: CLINIC | Age: 63
End: 2023-11-18
Payer: MEDICARE

## 2023-11-18 DIAGNOSIS — N13.8 BPH WITH OBSTRUCTION/LOWER URINARY TRACT SYMPTOMS: Primary | ICD-10-CM

## 2023-11-18 DIAGNOSIS — N40.1 BPH WITH OBSTRUCTION/LOWER URINARY TRACT SYMPTOMS: Primary | ICD-10-CM

## 2023-11-18 LAB
PROSTATE SPECIFIC ANTIGEN, TOTAL: 1.7 NG/ML (ref 0–4)
PSA FREE MFR SERPL: 27.65 %
PSA FREE SERPL-MCNC: 0.47 NG/ML (ref 0–1.5)

## 2023-11-18 NOTE — TELEPHONE ENCOUNTER
Reviewed visit with KOURTNEY Weller noting  F/u--It was thoroughly explained to pt upon conclusion of ov today that we will forward all ov notes to MD to review in order to determine suitable further workup and POC at this time.  Pt more than likely will need a repeat Cystoscopy and/or TRUS or both in the near future to evaluate his symptoms.  Pt verbalized understanding.     --> agree given his symptom profile and interval since treatment, would repeat cysto/trus  DTP placed and can choose January [facility] date    Set lab visit 1 week prior for ua/ucx  Lab collect orders placed  Continue flomax until

## 2023-11-20 DIAGNOSIS — N40.1 BPH WITH OBSTRUCTION/LOWER URINARY TRACT SYMPTOMS: Primary | ICD-10-CM

## 2023-11-20 DIAGNOSIS — N13.8 BPH WITH OBSTRUCTION/LOWER URINARY TRACT SYMPTOMS: Primary | ICD-10-CM

## 2023-11-20 NOTE — PROGRESS NOTES
Call patient.  Your urine drug screen shows you are  compliant in taking medication that was prescribed to you.  No problems found.  Continue current medication.

## 2023-11-20 NOTE — PROGRESS NOTES
Procedure Order to Urology [[id number]]    Electronically signed by: Jonathan Jacinto MD on 11/18/23 1323 Status: Active   Ordering user: Jonathan Jacinto MD 11/18/23 1323 Authorized by: Jonathan Jacinto MD   Ordering mode: Standard   Frequency:  11/18/23 -     Diagnoses  BPH with obstruction/lower urinary tract symptoms [N40.1, N13.8]   Questionnaire    Question Answer   Procedure Cystoscopy Comment - Papo (see tele)  TRUS/Trans Rectal Ultrasound   Facility Name: [facility], Please order Urine POCT without micro . Local sedation/trus

## 2023-11-20 NOTE — TELEPHONE ENCOUNTER
Spoke w pt informed of Md austin   Pt vu   Pt chose to be scheduled for 1/9  cysto/trus   Pt urine scheduled 1 wk prior   Discussed urgency/frquency

## 2023-12-04 ENCOUNTER — TELEPHONE (OUTPATIENT)
Dept: FAMILY MEDICINE | Facility: CLINIC | Age: 63
End: 2023-12-04

## 2023-12-04 NOTE — TELEPHONE ENCOUNTER
----- Message from Kenisha An sent at 12/4/2023  8:18 AM CST -----  Contact: Aura Mitchell is not going to change pharmacies. [facility] Aura @[phone]

## 2023-12-12 ENCOUNTER — TELEPHONE (OUTPATIENT)
Dept: FAMILY MEDICINE | Facility: CLINIC | Age: 63
End: 2023-12-12

## 2023-12-12 NOTE — TELEPHONE ENCOUNTER
----- Message from Grisel Ybarra MA sent at 12/12/2023  9:11 AM CST -----    ----- Message -----  From: Phyllis Ballard  Sent: 12/12/2023   9:09 AM CST  To: Jonathan Chacon Staff    Pt wife darci is calling for refill on hydrocodone   [facility]   [phone]

## 2023-12-12 NOTE — TELEPHONE ENCOUNTER
Spoke to pt wife darci and let her know Hydrocodone is at the pharmacy but can not be filled until tomorrow. Darci verbalized understanding

## 2023-12-26 DIAGNOSIS — G89.4 CHRONIC PAIN SYNDROME: ICD-10-CM

## 2023-12-26 RX ORDER — GABAPENTIN 300 MG/1
900 CAPSULE ORAL 3 TIMES DAILY
Qty: 270 CAPSULE | Refills: 4 | Status: SHIPPED | OUTPATIENT
Start: 2023-12-26 | End: 2024-06-23

## 2023-12-26 NOTE — TELEPHONE ENCOUNTER
Last ov 11/13  Next ov 2/23    Spoke to pts wife and she stated pt is taking it 2 - 3 times a day if he needs it.

## 2023-12-26 NOTE — TELEPHONE ENCOUNTER
----- Message from Shakila Estrada sent at 12/26/2023 12:57 PM CST -----  Refill Gabapentin [facility]. Pt's # [phone]

## 2024-01-03 ENCOUNTER — LAB VISIT (OUTPATIENT)
Dept: LAB | Facility: HOSPITAL | Age: 64
End: 2024-01-03
Attending: UROLOGY
Payer: MEDICARE

## 2024-01-03 DIAGNOSIS — N13.8 BPH WITH OBSTRUCTION/LOWER URINARY TRACT SYMPTOMS: ICD-10-CM

## 2024-01-03 DIAGNOSIS — N40.1 BPH WITH OBSTRUCTION/LOWER URINARY TRACT SYMPTOMS: ICD-10-CM

## 2024-01-03 PROCEDURE — 87086 URINE CULTURE/COLONY COUNT: CPT | Performed by: UROLOGY

## 2024-01-05 NOTE — DISCHARGE INSTRUCTIONS
Cystoscopy    Cystoscopy is a procedure that lets your doctor look directly inside your urethra and bladder. It can be used to:  Help diagnose a problem with your urethra, bladder, or kidneys.  Take a sample (biopsy) of bladder or urethral tissue.  Treat certain problems (such as removing kidney stones).  Place a stent to bypass an obstruction.  Take special X-rays of the kidneys.  Based on the findings, your doctor may recommend other tests or treatments.  What is a cystoscope?  A cystoscope is a telescope-like instrument that contains lenses and fiberoptics (small glass wires that make bright light). The cystoscope may be straight and rigid, or flexible to bend around curves in the urethra. The doctor may look directly into the cystoscope, or project the image onto a monitor.  Getting ready  Ask your doctor if you should stop taking any medicines before the procedure.  Ask whether you should avoid eating or drinking anything after midnight before the procedure.  Follow any other instructions your doctor gives you.  Tell your doctor before the exam if you:  Take any medicines, such as aspirin or blood thinners  Have allergies to any medicines  Are pregnant   The procedure  Cystoscopy is done in the doctors office, surgery center, or hospital. The doctor and a nurse are present during the procedure. It takes only a few minutes, longer if a biopsy, X-ray, or treatment needs to be done.  During the procedure:  You lie on an exam table on your back, knees bent and legs apart. You are covered with a drape.  Your urethra and the area around it are washed. Anesthetic jelly may be applied to numb the urethra. Other pain medicine is usually not needed. In some cases, you may be offered a mild sedative to help you relax. If a more extensive procedure is to be done, such as a biopsy or kidney stone removal, general anesthesia may be needed.  The cystoscope is inserted. A sterile fluid is put into the bladder to expand it.  You may feel pressure from this fluid.  When the procedure is done, the cystoscope is removed.  After the procedure  If you had a sedative, general anesthesia, or spinal anesthesia, you must have someone drive you home. Once youre home:  Drink plenty of fluids.  You may have burning or light bleeding when you urinate--this is normal.  Medicines may be prescribed to ease any discomfort or prevent infection. Take these as directed.  Call your doctor if you have heavy bleeding or blood clots, burning that lasts more than a day, a fever over 100°F  (38° C), or trouble urinating.    After Surgery:  Always be aware that any surgery can cause these symptoms:    Pain- Medication can be prescribed for pain to decrease your pain but may not completely take your pain away.  Over the Counter pain medicine my be enough and you can always use Ice and rest to help ease pain.    Bleeding- a little bleeding after a surgery is usually within normal.  If there is a lot of blood you need to notify your MD.  Emergency treatments of bleeding are cold application, elevation of the bleeding site and compression.    Infection- Infection after surgery is NOT a normal occurrence.  Signs of infection are fever, swelling, hot to touch the incision.  If this occurs notify your MD immediately.    Nausea- this can be common after a surgery especially if you have had anesthesia medicine or are taking pain medicine.  Staying on clear liquids, bland foods, gingerale, or over the counter anti nausea medicines can help.  If you vomit more than once, notify your MD.  Anti Nausea medicines can be prescribed.    Transrectal Ultrasound   A transrectal ultrasound is an imaging test. It uses sound waves to create pictures of a mans prostate gland to determine its size.Your prostate gland is in front of your rectum and if too large may become inflamed and impede the flow of urine. For this test, a special probe (transducer) is placed directly into your  rectum.     Before leaving, you may need to wait for a short time while the images are reviewed. In most cases, you can go back to your normal routine after the test.       © 9690-7250 [employer]. [address] [place] [address], [place] [address]. All rights reserved. This information is not intended as a substitute for professional medical care. Always follow your healthcare professional's instructions.

## 2024-01-06 LAB — BACTERIA UR CULT: NO GROWTH

## 2024-01-08 ENCOUNTER — TELEPHONE (OUTPATIENT)
Dept: UROLOGY | Facility: CLINIC | Age: 64
End: 2024-01-08

## 2024-01-08 NOTE — TELEPHONE ENCOUNTER
Spoke w pt informed of MD recs regarding reschedule of procedure schedule for tomm due to delay of insurance auth  Pt vu  Pt agreed with 1/23 for new date of procedure

## 2024-01-08 NOTE — TELEPHONE ENCOUNTER
Please apologize to patient as though his procedure tomorrow has been planned since November, due to changes at his [facility] insurance (now a [employer] plan) authorizations started over after the 1st of the year, and we have been message in back and forth with pre services, but it may take 2 weeks to authorize.    Please advise patient will need to be rescheduled to 1/23/24 rather than tomorrow so he is not responsible out of pocket.    Notify [facility].

## 2024-01-15 DIAGNOSIS — G89.4 CHRONIC PAIN SYNDROME: ICD-10-CM

## 2024-01-16 ENCOUNTER — TELEPHONE (OUTPATIENT)
Dept: UROLOGY | Facility: CLINIC | Age: 64
End: 2024-01-16

## 2024-01-16 RX ORDER — HYDROCODONE BITARTRATE AND ACETAMINOPHEN 10; 325 MG/1; MG/1
1 TABLET ORAL EVERY 6 HOURS PRN
Qty: 90 TABLET | Refills: 0 | Status: SHIPPED | OUTPATIENT
Start: 2024-01-16 | End: 2024-02-13 | Stop reason: SDUPTHER

## 2024-01-16 NOTE — TELEPHONE ENCOUNTER
prescription sent to   [facility] - [address]  [address] [address]  Phone: [phone] Fax: [phone]

## 2024-01-16 NOTE — TELEPHONE ENCOUNTER
----- Message from Heena Chaidez MA sent at 1/16/2024  4:19 PM CST -----  Type: Needs Medical Advice  Who Called:  pt   Best Call Back Number: [phone]    Additional Information: please advise that pt can't make procedure date on 01/23 he would like to rs for a different Monday

## 2024-01-17 NOTE — TELEPHONE ENCOUNTER
Spoke w pt would like to hold off on procedure and schedule for next avaiable Tuesday   Per pt ok to leave a message or portal with new date

## 2024-02-12 DIAGNOSIS — F41.9 ANXIETY DISORDER, UNSPECIFIED TYPE: ICD-10-CM

## 2024-02-12 RX ORDER — ALPRAZOLAM 1 MG/1
1 TABLET ORAL DAILY
Qty: 30 TABLET | Refills: 0 | Status: SHIPPED | OUTPATIENT
Start: 2024-02-12 | End: 2024-03-11 | Stop reason: SDUPTHER

## 2024-02-12 NOTE — TELEPHONE ENCOUNTER
----- Message from Phyllis Ballard sent at 2/12/2024  8:15 AM CST -----  Pt wife darci is calling and he needs refill on alprazolam, losartan  [facility]   [phone]    
Last office visit on 11/13/23 and upcoming on 2/23/24. Per  Xanax last filled on 1/12/24.   Xanax pended for provider review.   Losartan has a year supply that was prescribed on 11/13/23. Left voice message for patient.   
Needs to repeat uds at next visit with Yaya. Looks like positive for both xanax and klonopin. We only prescribe xanax. Sent in 30 day supply only.   
Spoke with patient and verbalized understanding of the year prescription on losartan and his xanax was sent in.   
81.6

## 2024-02-13 DIAGNOSIS — G89.4 CHRONIC PAIN SYNDROME: ICD-10-CM

## 2024-02-14 RX ORDER — HYDROCODONE BITARTRATE AND ACETAMINOPHEN 10; 325 MG/1; MG/1
1 TABLET ORAL EVERY 6 HOURS PRN
Qty: 90 TABLET | Refills: 0 | Status: SHIPPED | OUTPATIENT
Start: 2024-02-18 | End: 2024-03-18 | Stop reason: SDUPTHER

## 2024-02-14 NOTE — TELEPHONE ENCOUNTER
Last OV 11/13/2023  No upcoming OV.  UDS 11/23/2023    Per  last dispensed 01/19/2024     Rx order set up.

## 2024-02-23 ENCOUNTER — OFFICE VISIT (OUTPATIENT)
Dept: FAMILY MEDICINE | Facility: CLINIC | Age: 64
End: 2024-02-23
Payer: MEDICARE

## 2024-02-23 VITALS
HEART RATE: 96 BPM | DIASTOLIC BLOOD PRESSURE: 82 MMHG | BODY MASS INDEX: 35.1 KG/M2 | HEIGHT: 69 IN | OXYGEN SATURATION: 99 % | WEIGHT: 237 LBS | SYSTOLIC BLOOD PRESSURE: 142 MMHG

## 2024-02-23 DIAGNOSIS — G89.4 CHRONIC PAIN SYNDROME: ICD-10-CM

## 2024-02-23 DIAGNOSIS — I10 ESSENTIAL HYPERTENSION: Primary | ICD-10-CM

## 2024-02-23 DIAGNOSIS — Z77.090 ASBESTOS EXPOSURE: ICD-10-CM

## 2024-02-23 DIAGNOSIS — Z00.00 ROUTINE HEALTH MAINTENANCE: ICD-10-CM

## 2024-02-23 DIAGNOSIS — K21.9 GASTROESOPHAGEAL REFLUX DISEASE WITHOUT ESOPHAGITIS: ICD-10-CM

## 2024-02-23 DIAGNOSIS — G80.9 INFANTILE CEREBRAL PALSY: ICD-10-CM

## 2024-02-23 DIAGNOSIS — E78.2 MIXED HYPERLIPIDEMIA: ICD-10-CM

## 2024-02-23 DIAGNOSIS — N40.1 BPH WITH OBSTRUCTION/LOWER URINARY TRACT SYMPTOMS: ICD-10-CM

## 2024-02-23 DIAGNOSIS — L23.7 POISON IVY DERMATITIS: ICD-10-CM

## 2024-02-23 DIAGNOSIS — F41.9 ANXIETY DISORDER, UNSPECIFIED TYPE: ICD-10-CM

## 2024-02-23 DIAGNOSIS — N13.8 BPH WITH OBSTRUCTION/LOWER URINARY TRACT SYMPTOMS: ICD-10-CM

## 2024-02-23 PROCEDURE — 99214 OFFICE O/P EST MOD 30 MIN: CPT | Mod: S$GLB,,, | Performed by: PHYSICIAN ASSISTANT

## 2024-02-23 RX ORDER — CLOBETASOL PROPIONATE 0.5 MG/G
CREAM TOPICAL 2 TIMES DAILY
Qty: 45 G | Refills: 0 | Status: SHIPPED | OUTPATIENT
Start: 2024-02-23 | End: 2024-03-01

## 2024-02-23 RX ORDER — LOSARTAN POTASSIUM 100 MG/1
100 TABLET ORAL DAILY
Qty: 90 TABLET | Refills: 3 | Status: SHIPPED | OUTPATIENT
Start: 2024-02-23 | End: 2025-02-22

## 2024-02-23 NOTE — PROGRESS NOTES
"  SUBJECTIVE:    Patient ID: Patrick Wilson is a 63 y.o. male.    Chief Complaint: Follow-up (Brought bottles//refills needed// right ear sore // pt states he's been having problems having "relations" with his wife after having prostate surgery 2 years ago )    This is a 64 yo male here for 3 month follow up. Reports increase in back and LE pains from cerebral palsy. Sees Dr. Jacinto later this month for cystoscopy. Still wakes up 3-4x nightly despite using flomax. Reports increase in abdominal pain and bowel movements, using miralax daily as prescribed by Dr. Valle for severe constipation. Not coughing at night as much since stopping ACEi. 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 Was an every day smoker for 10 years, quit 35+ years ago    Follow-up  Pertinent negatives include no abdominal pain, chest pain, chills, congestion, fatigue or headaches.       Lab Visit on 01/03/2024   Component Date Value Ref Range Status    Specimen UA 01/03/2024 Urine, Clean Catch   Final    Color, UA 01/03/2024 Yellow  Yellow, Straw, Laila Final    Appearance, UA 01/03/2024 Clear  Clear Final    pH, UA 01/03/2024 7.0  5.0 - 8.0 Final    Specific Gravity, UA 01/03/2024 1.020  1.005 - 1.030 Final    Protein, UA 01/03/2024 Negative  Negative Final    Glucose, UA 01/03/2024 Negative  Negative Final    Ketones, UA 01/03/2024 Negative  Negative Final    Bilirubin (UA) 01/03/2024 Negative  Negative Final    Occult Blood UA 01/03/2024 Negative  Negative Final    Nitrite, UA 01/03/2024 Negative  Negative Final    Urobilinogen, UA 01/03/2024 Negative  <2.0 EU/dL Final    Leukocytes, UA 01/03/2024 Negative  Negative Final   Lab Visit on 01/03/2024   Component Date Value Ref Range Status    Urine Culture, Routine 01/03/2024 No growth   Final   Lab Visit on 11/17/2023   Component Date Value Ref Range Status "    PSA Total 11/17/2023 1.7  0.00 - 4.00 ng/mL Final    PSA, Free 11/17/2023 0.47  0.00 - 1.50 ng/mL Final    PSA, Free % 11/17/2023 27.65  Not established % Final   Office Visit on 11/17/2023   Component Date Value Ref Range Status    POC Residual Urine Volume 11/17/2023 69  0 - 100 mL Final   Office Visit on 11/13/2023   Component Date Value Ref Range Status    Amphetamines 11/13/2023 NEGATIVE  <500 ng/mL Final    Barbiturates 11/13/2023 NEGATIVE  <300 ng/mL Final    Benzodiazepines 11/13/2023 POSITIVE (A)  <100 ng/mL Final    Alphahydroxyalprazolam 11/13/2023 137 (H)  <25 ng/mL Final    Alphahydroxymidazolam 11/13/2023 NEGATIVE  <50 ng/mL Final    Alphahydroxytriazolam 11/13/2023 NEGATIVE  <50 ng/mL Final    Aminoclonazepam 11/13/2023 44 (H)  <25 ng/mL Final    hydroxyethylflurazepam UR GC/MS 11/13/2023 NEGATIVE  <50 ng/mL Final    Lorazepam 11/13/2023 NEGATIVE  <50 ng/mL Final    Nordiazepam Lvl 11/13/2023 NEGATIVE  <50 ng/mL Final    Oxazepam 11/13/2023 NEGATIVE  <50 ng/mL Final    Temazepam GC/MS Conf 11/13/2023 NEGATIVE  <50 ng/mL Final    Benzodiazepines Comments 11/13/2023    Final    Cocaine Metabolites 11/13/2023 NEGATIVE  <150 ng/mL Final    Methadone 11/13/2023 NEGATIVE  <100 ng/mL Final    Opiates 11/13/2023 POSITIVE (A)  <100 ng/mL Final    Codeine 11/13/2023 NEGATIVE  <50 ng/mL Final    Hydrocodone 11/13/2023 665 (H)  <50 ng/mL Final    Hydromorphone 11/13/2023 131 (H)  <50 ng/mL Final    Morphine 11/13/2023 NEGATIVE  <50 ng/mL Final    NORHYDROCODONE 11/13/2023 606 (H)  <50 ng/mL Final    Opiates Comments 11/13/2023    Final    Oxycodone 11/13/2023 NEGATIVE  <100 ng/mL Final    Phencyclidine 11/13/2023 NEGATIVE  <25 ng/mL Final    Creatinine 11/13/2023 68.8  > or = 20.0 mg/dL Final    pH 11/13/2023 6.7  4.5 - 9.0 Final    Oxidants, Urine (Tox) 11/13/2023 NEGATIVE  <200 mcg/mL Final    Notes and Comments 11/13/2023    Final       Past Medical History:   Diagnosis Date    Anticoagulant long-term use      Anxiety     Arthritis     Back pain     Cerebral palsy     Depression     GERD (gastroesophageal reflux disease)     Hypertension     Peptic ulcer of stomach     Seizures     AS A CHILD    Wears glasses     CONTACS     Past Surgical History:   Procedure Laterality Date    ANKLE SURGERY Left     BACK SURGERY      CYSTOSCOPY N/A 7/17/2018    Procedure: CYSTOSCOPY;  Surgeon: Jonathan Jacinto MD;  Location: [facility] OR;  Service: Urology;  Laterality: N/A;    CYSTOSCOPY N/A 4/6/2021    Procedure: CYSTOSCOPY;  Surgeon: Jonathan Jacinto MD;  Location: [facility] OR;  Service: Urology;  Laterality: N/A;    CYSTOSCOPY WITH INSERTION OF MINIMALLY INVASIVE IMPLANT TO ENLARGE PROSTATIC URETHRA N/A 8/2/2018    Procedure: CYSTOSCOPY, WITH INSERTION OF UROLIFT IMPLANT;  Surgeon: Jonathan Jacinto MD;  Location: [facility] OR;  Service: Urology;  Laterality: N/A;    LEG SURGERY Left     SHOULDER SURGERY Right     TRANSRECTAL ULTRASOUND EXAMINATION N/A 7/17/2018    Procedure: ULTRASOUND, TRANSRECTAL;  Surgeon: Jonathan Jacinto MD;  Location: [facility] OR;  Service: Urology;  Laterality: N/A;    TRANSURETHRAL RESECTION OF PROSTATE N/A 5/27/2021    Procedure: TURP (TRANSURETHRAL RESECTION OF PROSTATE);  Surgeon: Jonathan Jacinto MD;  Location: [facility] OR;  Service: Urology;  Laterality: N/A;     Family History   Problem Relation Age of Onset    Melanoma Father        Marital Status:   Alcohol History:  reports current alcohol use.  Tobacco History:  reports that he has been smoking pipe. He has been exposed to tobacco smoke. He has never used smokeless tobacco.  Drug History:  reports no history of drug use.    Review of patient's allergies indicates:   Allergen Reactions    Tylox [oxycodone-acetaminophen] Nausea And Vomiting       Current Outpatient Medications:     ALPRAZolam (XANAX) 1 MG tablet, Take 1 tablet (1 mg total) by mouth once daily., Disp: 30 tablet, Rfl: 0    amLODIPine (NORVASC) 10 MG tablet, Take 1 tablet (10 mg total) by  mouth once daily., Disp: 90 tablet, Rfl: 3    aspirin (ECOTRIN) 81 MG EC tablet, Take 81 mg by mouth once daily., Disp: , Rfl:     atorvastatin (LIPITOR) 40 MG tablet, Take 1 tablet (40 mg total) by mouth once daily., Disp: 90 tablet, Rfl: 3    augmented betamethasone dipropionate (DIPROLENE-AF) 0.05 % cream, Use on AA of hand twice daily., Disp: 50 g, Rfl: 1    cetirizine (ZYRTEC) 10 MG tablet, Take 1 tablet (10 mg total) by mouth once daily., Disp: 90 tablet, Rfl: 1    cyclobenzaprine (FLEXERIL) 5 MG tablet, Take 1 tablet (5 mg total) by mouth 3 (three) times daily as needed., Disp: 90 tablet, Rfl: 2    DULoxetine (CYMBALTA) 60 MG capsule, Take 1 capsule (60 mg total) by mouth once daily., Disp: 90 capsule, Rfl: 3    fluticasone propionate (FLONASE) 50 mcg/actuation nasal spray, 1 spray (50 mcg total) by Each Nostril route as needed., Disp: 16 g, Rfl: 3    gabapentin (NEURONTIN) 300 MG capsule, Take 3 capsules (900 mg total) by mouth 3 (three) times daily., Disp: 270 capsule, Rfl: 4    hydroCHLOROthiazide (HYDRODIURIL) 25 MG tablet, Take 1 tablet (25 mg total) by mouth once daily., Disp: 90 tablet, Rfl: 3    HYDROcodone-acetaminophen (NORCO)  mg per tablet, Take 1 tablet by mouth every 6 (six) hours as needed for Pain., Disp: 90 tablet, Rfl: 0    KRILL OIL ORAL, Take by mouth., Disp: , Rfl:     lisinopriL 10 MG tablet, Take 1 tablet (10 mg total) by mouth once daily., Disp: 90 tablet, Rfl: 1    multivit-min/folic/vit K/lycop (MEN'S 50 PLUS MULTIVITAMIN ORAL), Take 1 tablet by mouth once daily., Disp: , Rfl:     pantoprazole (PROTONIX) 40 MG tablet, Take 1 tablet (40 mg total) by mouth once daily., Disp: 90 tablet, Rfl: 1    tamsulosin (FLOMAX) 0.4 mg Cap, Take 1 capsule (0.4 mg total) by mouth once daily. Take in evening., Disp: 90 capsule, Rfl: 4    triamcinolone acetonide 0.025% (KENALOG) 0.025 % cream, Apply topically 2 (two) times daily., Disp: 80 g, Rfl: 0    clobetasoL (TEMOVATE) 0.05 % cream, Apply  topically 2 (two) times daily. for 7 days, Disp: 45 g, Rfl: 0    D3/E/Se/soy isofl/tocoph/lycop (PROSTATE 2.4 ORAL), Take by mouth., Disp: , Rfl:     HYDROcodone-acetaminophen (NORCO)  mg per tablet, Take 1 tablet by mouth every 6 (six) hours as needed for Pain. (Patient not taking: Reported on 2/23/2024), Disp: 90 tablet, Rfl: 0    HYDROcodone-acetaminophen (NORCO)  mg per tablet, Take 1 tablet by mouth every 6 (six) hours as needed for Pain. (Patient not taking: Reported on 2/23/2024), Disp: 90 tablet, Rfl: 0    HYDROcodone-acetaminophen (NORCO)  mg per tablet, Take 1 tablet by mouth every 6 (six) hours as needed for Pain. (Patient not taking: Reported on 2/23/2024), Disp: 90 tablet, Rfl: 0    HYDROcodone-acetaminophen (NORCO)  mg per tablet, Take 1 tablet by mouth every 6 (six) hours as needed for Pain. (Patient not taking: Reported on 2/23/2024), Disp: 90 tablet, Rfl: 0    HYDROcodone-acetaminophen (NORCO)  mg per tablet, Take 1 tablet by mouth every 6 (six) hours as needed for Pain. (Patient not taking: Reported on 2/23/2024), Disp: 90 tablet, Rfl: 0    losartan (COZAAR) 100 MG tablet, Take 1 tablet (100 mg total) by mouth once daily., Disp: 90 tablet, Rfl: 3    SUTAB 1.479-0.188- 0.225 gram tablet, Take by mouth., Disp: , Rfl:   No current facility-administered medications for this visit.    Facility-Administered Medications Ordered in Other Visits:     ampicillin 2 g in sodium chloride 0.9 % 100 mL IVPB (ready to mix system), 2 g, Intravenous, On Call Procedure, Jonathan Jacinto MD, 2 g at 05/27/21 1234    Review of Systems   Constitutional:  Negative for chills, fatigue and unexpected weight change.   HENT:  Negative for congestion.    Respiratory:  Negative for shortness of breath and wheezing.    Cardiovascular:  Negative for chest pain and palpitations.   Gastrointestinal:  Positive for abdominal distention. Negative for abdominal pain.   Genitourinary:  Positive for  "frequency. Negative for difficulty urinating and hematuria.   Musculoskeletal:  Positive for back pain.   Neurological:  Negative for headaches.          Objective:      Vitals:    02/23/24 0918   BP: (!) 142/82   Pulse: 96   SpO2: 99%   Weight: 107.5 kg (237 lb)   Height: 5' 9" (1.753 m)     Physical Exam  Constitutional:       Appearance: Normal appearance.   HENT:      Head: Normocephalic and atraumatic.   Eyes:      Extraocular Movements: Extraocular movements intact.      Pupils: Pupils are equal, round, and reactive to light.   Cardiovascular:      Rate and Rhythm: Regular rhythm. Tachycardia present.      Pulses: Normal pulses.   Pulmonary:      Effort: Pulmonary effort is normal.      Breath sounds: Normal breath sounds.   Abdominal:      General: Abdomen is flat.      Palpations: Abdomen is soft.   Skin:     General: Skin is warm and dry.      Capillary Refill: Capillary refill takes less than 2 seconds.   Neurological:      Mental Status: He is alert.           Assessment:       1. Essential hypertension    2. Poison ivy dermatitis    3. Anxiety disorder, unspecified type    4. Mixed hyperlipidemia    5. Gastroesophageal reflux disease without esophagitis    6. BPH with obstruction/lower urinary tract symptoms    7. Routine health maintenance    8. Infantile cerebral palsy    9. Asbestos exposure    10. Chronic pain syndrome         Plan:       Essential hypertension  Comments:  Taking losartan 50, amlodipine, and HCTZ daily. Will bump up losartan to 100mg for better BP control.  Orders:  -     losartan (COZAAR) 100 MG tablet; Take 1 tablet (100 mg total) by mouth once daily.  Dispense: 90 tablet; Refill: 3    Poison ivy dermatitis  Comments:  Wants refill of clobetasol  Orders:  -     clobetasoL (TEMOVATE) 0.05 % cream; Apply topically 2 (two) times daily. for 7 days  Dispense: 45 g; Refill: 0    Anxiety disorder, unspecified type  Comments:  Takes Xanax daily for anxiety, symptoms well " controlled.    Mixed hyperlipidemia  Comments:  Labs reviewed from 9/2023.    Gastroesophageal reflux disease without esophagitis  Comments:  Protonix doing well for control of symptoms.    BPH with obstruction/lower urinary tract symptoms  Comments:  Still 3-4 episodes of nocturia nightly with flomax. Follows with Dr. Jacinto. will need to discuss sxs and ED also.    Routine health maintenance    Infantile cerebral palsy    Asbestos exposure  Comments:  CXR from 3 years ago normal. will recheck now for screening.  Orders:  -     X-Ray Chest PA And Lateral; Future; Expected date: 02/23/2024    Chronic pain syndrome  Comments:  Managed pretty well. Refills today through Dr. Chacon      Follow up in about 6 months (around 8/23/2024).        2/23/2024 Yaya Nick PA-C

## 2024-02-27 ENCOUNTER — HOSPITAL ENCOUNTER (OUTPATIENT)
Facility: HOSPITAL | Age: 64
Discharge: HOME | End: 2024-02-27
Attending: UROLOGY | Admitting: UROLOGY
Payer: MEDICARE

## 2024-02-27 DIAGNOSIS — N13.8 BPH WITH OBSTRUCTION/LOWER URINARY TRACT SYMPTOMS: ICD-10-CM

## 2024-02-27 DIAGNOSIS — N40.1 BPH WITH OBSTRUCTION/LOWER URINARY TRACT SYMPTOMS: ICD-10-CM

## 2024-02-27 LAB
BILIRUBIN, UA POC OHS: NEGATIVE
BLOOD, UA POC OHS: NEGATIVE
CLARITY, UA POC OHS: CLEAR
COLOR, UA POC OHS: ABNORMAL
GLUCOSE, UA POC OHS: NEGATIVE
KETONES, UA POC OHS: ABNORMAL
LEUKOCYTES, UA POC OHS: NEGATIVE
NITRITE, UA POC OHS: NEGATIVE
PH, UA POC OHS: 6
PROTEIN, UA POC OHS: NEGATIVE
SPECIFIC GRAVITY, UA POC OHS: 1.02
UROBILINOGEN, UA POC OHS: 0.2

## 2024-02-27 PROCEDURE — A4217 STERILE WATER/SALINE, 500 ML: HCPCS | Performed by: UROLOGY

## 2024-02-27 PROCEDURE — 52000 CYSTOURETHROSCOPY: CPT | Mod: ,,, | Performed by: UROLOGY

## 2024-02-27 PROCEDURE — 52000 CYSTOURETHROSCOPY: CPT | Performed by: UROLOGY

## 2024-02-27 PROCEDURE — 25000003 PHARM REV CODE 250: Performed by: UROLOGY

## 2024-02-27 PROCEDURE — 76872 US TRANSRECTAL: CPT | Performed by: UROLOGY

## 2024-02-27 PROCEDURE — 76872 US TRANSRECTAL: CPT | Mod: 26,,, | Performed by: UROLOGY

## 2024-02-27 RX ORDER — SOLIFENACIN SUCCINATE 5 MG/1
5 TABLET, FILM COATED ORAL DAILY
Qty: 90 TABLET | Refills: 3 | Status: SHIPPED | OUTPATIENT
Start: 2024-02-27 | End: 2025-02-26

## 2024-02-27 RX ORDER — WATER 1 ML/ML
IRRIGANT IRRIGATION
Status: DISCONTINUED | OUTPATIENT
Start: 2024-02-27 | End: 2024-02-27 | Stop reason: HOSPADM

## 2024-02-27 RX ORDER — LIDOCAINE HYDROCHLORIDE 20 MG/ML
JELLY TOPICAL
Status: DISCONTINUED | OUTPATIENT
Start: 2024-02-27 | End: 2024-02-27 | Stop reason: HOSPADM

## 2024-02-27 RX ORDER — CIPROFLOXACIN 500 MG/1
500 TABLET ORAL 2 TIMES DAILY
Qty: 4 TABLET | Refills: 0 | Status: SHIPPED | OUTPATIENT
Start: 2024-02-27

## 2024-02-27 NOTE — PLAN OF CARE
Discharge instructions given to pt/wife, verbalized understanding.  Tolerating PO fluids.  Denies pain.  Prescriptions sent to pharmacy per dr robbins.  Ambulating out with wife in no distress.

## 2024-02-27 NOTE — H&P
[facility] Note  Staff: SHABNAM Coe-C     PCP: LAINA Chacon  Urologist:  LAINA Jacinto     Chief Complaint: F/UP-LUTS worsening; BPH S/P TURP (2021) and erectile dysfunction issues.     Subjective:         Subjective    HPI: Patrick Wilson is a 63 y.o. male presents in office today for evaluation of worsening LUTS and ED at this time.     Pt was last evaluated by Dr. Jacinto in 10/2021 for BPH S/P TURP procedure.     63-year-old man with long history of BPH refractory to alpha blockers found to have 28.2g obstructing prostate with lateral lobe obstruction for which he had urolift in 8/2/18 with good initial response and decrease in symptom score from 26/6 to 3/2. Lost to follow up afte rmay 2019 and recently returned to primary care noting recurrence of symptoms, and the subjectively better than preoperatively, still significant severe LUTS with AUA symptom score 23-24/4 though emptying well with a PVR of 29 cc.  Significant weak stream but also significant progression of urgency and frequency again.  Repeat cystoscopic inspection found good lateral retraction proximally and distally from prior implant placement but some recurrent proximal obstruction under the level of implants from lateral lobe tissue but more so from anterior ingrowth obstructing tissue at the anterior bladder neck.  After extensive discussion of management options, patient elected to proceed with transurethral resection of prostate     5/27/21 TURP/TUEVP: Moderate lateral lobe tissue ingrowth under the level the previously placed anterior implants, as well as significant anterolateral obstructing tissue proximally.  Button TURP performed to vaporize all intervening tissue and obstructing tissue, only exposing mucosalized endplate of 1 of 4 prior implants without need to resect it or remove it  - meatal and fossa navicular narrowing requiring Gilma calibration to 30 Greenlandic to pass instruments     6/17/21:  AUASS: 21/6, PVR 19cc (emptying- 5; Frequency- 5; Intermittency- 3; Urgency- 0; Weak Stream- 5; Straining- 0; Sleeping-3)     7/12/21: AUA SS:  14/5 (4:  Sleeping; 3:  Urgency, weak stream; 2:  Emptying, frequency). Severe urgency with UUI. PVR 16cc  No constipation, daily soft bm despite 1-2 hydrocodone daily. ++ snoring. No evaluation for MELINA prior. No split stream or spraying. Occ dribbles.   2 cups AM coffee, tries to drink some water. Water and lemonade in evening. Tries to stop at least 2h before bed  Small volume voids, usually no more than 100ml. DTF 4-5x, all with urgency, nocturia 3-4x with urge waking him up   2/5 no urgency bc trying to timed void. Daily UUI. Did not stop flomax 1 week after but did 1 month after not much change in frequency stopping.  Nocturnal urgency with UUI all voids waking up from sleep. Pad at night. Mostly no pad during day.  - started ditropan 10XL, of note did need meatal calibration at time of turp     OV 10/12/2021:  AUA SS: 10-11/4 (3-4 nocturia; 3 weak stream; 2: frequency, urgency)  Bowels not moving very well.   DTF 4x, though NTF 3-4x.    No longer having to pull over to urinate. Very rare FRANK  Pain/urge wakes him up at night to urinate.   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 Still taking pain meds.   ++ Snoring. Has never had a sleep study  Has been minimizing fluid intake in evening     TODAY:  UA in office today-Pt was unable to urinate today.  PVR of 69 mL  No gross hematuria  No dysuria     ED-pt has tried Viagra and Cialis in the past and unable to take p.o. meds due to headaches as SE.     AUA SS Today:  29/6  Feeling of ICBE:  5  Frequency:3  Intermittency:4  Urgency:4  Weak urine  stream:5  Strainin  Nocturia:3     DOM Score: 5 Severe ED symptoms  1  1  1  1  1     REVIEW OF SYSTEMS:  A comprehensive 10 system review was performed and is negative except as noted above in HPI     PMHx:       Past Medical History:   Diagnosis Date    Anticoagulant long-term use      Anxiety      Arthritis      Back pain      Cerebral palsy      Depression      GERD (gastroesophageal reflux disease)      Hypertension      Peptic ulcer of stomach      Seizures       AS A CHILD    Wears glasses       CONTACS      PSHx:        Past Surgical History:   Procedure Laterality Date    ANKLE SURGERY Left      BACK SURGERY        CYSTOSCOPY N/A 2018     Procedure: CYSTOSCOPY;  Surgeon: Jonathan Jacinto MD;  Location: [facility] OR;  Service: Urology;  Laterality: N/A;    CYSTOSCOPY N/A 2021     Procedure: CYSTOSCOPY;  Surgeon: Jonathan Jacinto MD;  Location: [facility] OR;  Service: Urology;  Laterality: N/A;    CYSTOSCOPY WITH INSERTION OF MINIMALLY INVASIVE IMPLANT TO ENLARGE PROSTATIC URETHRA N/A 2018     Procedure: CYSTOSCOPY, WITH INSERTION OF UROLIFT IMPLANT;  Surgeon: Jonathan Jacinto MD;  Location: [facility] OR;  Service: Urology;  Laterality: N/A;    LEG SURGERY Left      SHOULDER SURGERY Right      TRANSRECTAL ULTRASOUND EXAMINATION N/A 2018     Procedure: ULTRASOUND, TRANSRECTAL;  Surgeon: Jonathan Jacinto MD;  Location: [facility] OR;  Service: Urology;  Laterality: N/A;    TRANSURETHRAL RESECTION OF PROSTATE N/A 2021     Procedure: TURP (TRANSURETHRAL RESECTION OF PROSTATE);  Surgeon: Jonathan Jacinto MD;  Location: [facility] OR;  Service: Urology;  Laterality: N/A;      Allergies:  Tylox [oxycodone-acetaminophen]     Medications: reviewed   Objective:   There were no vitals filed for this visit.     General:WDWN in NAD  Eyes: PERRLA, normal conjunctiva  Respiratory: no increased work on breathing, clear to auscultation  Cardiovascular: regular rate and rhythm. No obvious extremity edema.  GI:  palpation of masses. No tenderness. No hepatosplenomegaly to palpation.  Musculoskeletal: normal range of motion of bilateral upper extremities. Normal muscle strength and tone.  Skin: no obvious rashes or lesions. No tightening of skin noted.  Neurologic: CN grossly normal. Normal sensation.   Psychiatric: awake, alert and oriented x 3. Mood and affect normal. Cooperative.      Exam performed by me in OV today:  30-35g enlarged, smooth prostate exam  No masses, no nodules or tenderness upon exam today.     Assessment:         Assessment   1. Benign prostatic hyperplasia with urinary frequency    2. Other male erectile dysfunction             Plan:   LUTS s/p TURP, ED issues     Overdue for PSA, Total and Free-to be scheduled.     Restart Flomax 0.4 mg one capsule daily at this time for his current LUTS.  The med prescribed to pt today as trial to see if med improves pt's current LUTS.  Benefits, risks and side affects were thoroughly explained to pt today in office with all questions answered.     Discussed conservative measures to control urgency and frequency including avoiding bladder irritants, bladder timed voiding, not postponing voiding, and bowel regimen (as distended bowel has extrinsic compressive effect on bladder. Discussed bladder irritants include coffe (even decaf), tea, alcohol, soda, spicy foods, acidic juices (orange, tomato), vinegar, and artificial sweeteners.      F/u--It was thoroughly explained to pt upon conclusion of ov today that we will forward all ov notes to MD to review in order to determine suitable further workup and POC at this time.  Pt more than likely will need a repeat Cystoscopy and/or TRUS or both in the near future to evaluate his symptoms.  Pt verbalized understanding.          Reviewed visit with N.P. OGilmar noting  F/u--It was thoroughly explained to pt upon conclusion of ov today that we will forward all ov notes to MD to review in order to determine suitable further  workup and POC at this time.  Pt more than likely will need a repeat Cystoscopy and/or TRUS or both in the near future to evaluate his symptoms.  Pt verbalized understanding.      --> agree given his symptom profile and interval since treatment, would repeat cysto/trus  DTP placed and can choose January ASC date     Set lab visit 1 week prior for ua/ucx  Lab collect orders placed  Continue flomax until      Psa 1.7 free 27%

## 2024-02-28 VITALS
HEIGHT: 69 IN | BODY MASS INDEX: 34.51 KG/M2 | OXYGEN SATURATION: 96 % | HEART RATE: 96 BPM | DIASTOLIC BLOOD PRESSURE: 87 MMHG | SYSTOLIC BLOOD PRESSURE: 165 MMHG | TEMPERATURE: 98 F | WEIGHT: 233 LBS | RESPIRATION RATE: 18 BRPM

## 2024-02-28 NOTE — OP NOTE
[facility] Operative/Brief Discharge Note     Date: 2/27/24     Staff Surgeon: Jonathan Jacinto MD     Pre-Op Diagnosis:   BPH with LUTS     Post-Op Diagnosis:   same     Procedure(s) Performed:   Cystoscopy, flexible  Transrectal ultrasound with volumetric measurement of prostate     Specimen(s): none     Anesthesia: local, 2% xylocaine jelly urojet     Findings:   Volume 30.37 cm3 (W 50mm, H 22.61mm, L 51.36mm), no med lobe, minimal PVR, tur defect seen   Cysto:  Open prostatic urethra post TURP with minimal right lateral lobe ingrowth, nonobstructing, with continuous channel from verumontanum to bladder neck noting to be open with flow of irrigation off.  No strictures.  Minimally trabeculated bladder with no mucosal lesions.        Estimated Blood Loss: none     Drains: none     Complications: none     Indications for procedure:  62yo M with long history of BPH/LUTS who failed minimally invasive BPH intervention and underwent definitive resection with TURP in May of 2021.  By late 2021 was doing overall well with only symptomatic complaints being frequency urgency and bothersome nocturia.  Discussed MELINA evaluation, was lost to follow-up until returning to see NP recently with severe obstructive lower urinary tract symptoms for which Flomax was restarted, but did not provide benefit.  He presents today for repeat lower tract evaluation to help guide further recommendations.     Procedure in detail:  After informed consent, the patient was placed in left lateral decubitus position. Transrectal ultrasound probe was passed into the rectum and the prostate was visualized on the screen.  Three-dimensional measurements taken as above with reported prostate volume as documented.  Pertinent ultrasound findings noted above, with absence of median lobe, and No ultrasonic abnormalities of prostate were visualized. TUR defect seen. Transverse and saggital image captured.  The ultrasound probe was removed     He was then  placed in supine position and prepped and drapped in standard cystoscopic fashion and 2% xylocaine jelly was instilled into the urethra.  A flexible cystoscope was passed into the bladder via the urethra.      Anterior urethra normal without abnormality.. The prostatic urethra demonstrated unobstructed channel as described above.      Bladder otherwise systematically inspected and no mucosal lesions or tumors seen.  Bladder was also assessed for signs of chronic obstruction such as trabeculations, cellules, diverticulum, of which pertinent findings are noted above with extensive signs of chronic obstruction.  Bilateral ureteral orifices seen in orthotopic position on trigone bilaterally with clear efflux.       Patient tolerated the procedure well. No complications     Disposition:  Reviewed findings with patient and wife noting no recurrent or residual obstruction, which is likely why his alpha-blocker has provided no benefit.  Advise he could stop Flomax at this time as there has no obstruction present.  Certainly he did have severe overactive bladder symptoms prior to BPH intervention, which maintained at a low level after intervention, and his most bothersome complaint at this time is nocturia and nocturnal frequency.  He does still snore significantly, never pursued MELINA evaluation, and wife notes that there are snoring/screaming noises.  He has gained some weight as well in the interim.  As well, has some chronic constipation from chronic pain medication use.  We did focus on his urgency frequency and nocturia including conservative recommendations for urgency frequency which were again provided, as well as recommendations for nocturia such as limiting bladder irritants in the afternoon and evening hours, stopping fluids 2 hours before bed, and voiding just before bed and not drinking at night.  I distinctly advised him to pursue MELINA evaluation, as in the absence of prostate obstruction, sleep apnea and its  pathophysiology with diuretic effect will yield nighttime urination unless managed.  Encouraged diet exercise weight loss and control of underlying medical problems.  As well, had risk benefit discussion about OAB medication for urgency frequency, noting maximum benefit is at three-month, and agreed for Rx which was sent to pharmacy, so in combination with all above, will have him follow-up with NP in 6 months for re-evaluation after further discussion with PCP MELINA evaluation following urgency frequency lifestyle modifications and medical management for urgency frequency.  But should discontinue Flomax.     Discharge home today status post uncomplicated procedure as above  Diet - resume home diet  Follow up: 6 mos NP  Drink plenty of water, may see blood in urine, complete prophylactic antibiotics.  Follow conservative recommendations for urgency frequency. Get MELINA eval  Meds:     Medication List        START taking these medications      ciprofloxacin HCl 500 MG tablet  Commonly known as: CIPRO  Take 1 tablet (500 mg total) by mouth 2 (two) times a day.     solifenacin 5 MG tablet  Commonly known as: VESICARE  Take 1 tablet (5 mg total) by mouth once daily.            CONTINUE taking these medications      ALPRAZolam 1 MG tablet  Commonly known as: XANAX  Take 1 tablet (1 mg total) by mouth once daily.     amLODIPine 10 MG tablet  Commonly known as: NORVASC  Take 1 tablet (10 mg total) by mouth once daily.     aspirin 81 MG EC tablet  Commonly known as: ECOTRIN     atorvastatin 40 MG tablet  Commonly known as: LIPITOR  Take 1 tablet (40 mg total) by mouth once daily.     augmented betamethasone dipropionate 0.05 % cream  Commonly known as: DIPROLENE-AF  Use on AA of hand twice daily.     cetirizine 10 MG tablet  Commonly known as: ZYRTEC  Take 1 tablet (10 mg total) by mouth once daily.     clobetasoL 0.05 % cream  Commonly known as: TEMOVATE  Apply topically 2 (two) times daily. for 7 days     cyclobenzaprine 5 MG  tablet  Commonly known as: FLEXERIL  Take 1 tablet (5 mg total) by mouth 3 (three) times daily as needed.     DULoxetine 60 MG capsule  Commonly known as: CYMBALTA  Take 1 capsule (60 mg total) by mouth once daily.     fluticasone propionate 50 mcg/actuation nasal spray  Commonly known as: FLONASE  1 spray (50 mcg total) by Each Nostril route as needed.     gabapentin 300 MG capsule  Commonly known as: NEURONTIN  Take 3 capsules (900 mg total) by mouth 3 (three) times daily.     hydroCHLOROthiazide 25 MG tablet  Commonly known as: HYDRODIURIL  Take 1 tablet (25 mg total) by mouth once daily.     * HYDROcodone-acetaminophen  mg per tablet  Commonly known as: NORCO  Take 1 tablet by mouth every 6 (six) hours as needed for Pain.     KRILL OIL ORAL     lisinopriL 10 MG tablet  Take 1 tablet (10 mg total) by mouth once daily.     losartan 100 MG tablet  Commonly known as: COZAAR  Take 1 tablet (100 mg total) by mouth once daily.     MEN'S 50 PLUS MULTIVITAMIN ORAL     pantoprazole 40 MG tablet  Commonly known as: PROTONIX  Take 1 tablet (40 mg total) by mouth once daily.     PROSTATE 2.4 ORAL     SUTAB 1.479-0.188- 0.225 gram tablet  Generic drug: sod sulf-pot chloride-mag sulf     triamcinolone acetonide 0.025% 0.025 % cream  Commonly known as: KENALOG  Apply topically 2 (two) times daily.           * This list has 1 medication(s) that are the same as other medications prescribed for you. Read the directions carefully, and ask your doctor or other care provider to review them with you.                STOP taking these medications      tamsulosin 0.4 mg Cap  Commonly known as: FLOMAX            ASK your doctor about these medications      * HYDROcodone-acetaminophen  mg per tablet  Commonly known as: NORCO  Take 1 tablet by mouth every 6 (six) hours as needed for Pain.     * HYDROcodone-acetaminophen  mg per tablet  Commonly known as: NORCO  Take 1 tablet by mouth every 6 (six) hours as needed for  Pain.     * HYDROcodone-acetaminophen  mg per tablet  Commonly known as: NORCO  Take 1 tablet by mouth every 6 (six) hours as needed for Pain.     * HYDROcodone-acetaminophen  mg per tablet  Commonly known as: NORCO  Take 1 tablet by mouth every 6 (six) hours as needed for Pain.     * HYDROcodone-acetaminophen  mg per tablet  Commonly known as: NORCO  Take 1 tablet by mouth every 6 (six) hours as needed for Pain.           * This list has 5 medication(s) that are the same as other medications prescribed for you. Read the directions carefully, and ask your doctor or other care provider to review them with you.                   Where to Get Your Medications        These medications were sent to [facility] [place]  [address] [address]      Phone: [phone]   ciprofloxacin HCl 500 MG tablet  solifenacin 5 MG tablet

## 2024-03-11 DIAGNOSIS — F41.9 ANXIETY DISORDER, UNSPECIFIED TYPE: ICD-10-CM

## 2024-03-11 DIAGNOSIS — I10 ESSENTIAL HYPERTENSION: ICD-10-CM

## 2024-03-11 RX ORDER — HYDROCHLOROTHIAZIDE 25 MG/1
25 TABLET ORAL DAILY
Qty: 90 TABLET | Refills: 3 | Status: SHIPPED | OUTPATIENT
Start: 2024-03-11

## 2024-03-11 RX ORDER — ALPRAZOLAM 1 MG/1
1 TABLET ORAL DAILY
Qty: 30 TABLET | Refills: 0 | Status: SHIPPED | OUTPATIENT
Start: 2024-03-11 | End: 2024-03-21 | Stop reason: SDUPTHER

## 2024-03-18 DIAGNOSIS — G89.4 CHRONIC PAIN SYNDROME: ICD-10-CM

## 2024-03-18 RX ORDER — HYDROCODONE BITARTRATE AND ACETAMINOPHEN 10; 325 MG/1; MG/1
1 TABLET ORAL EVERY 6 HOURS PRN
Qty: 90 TABLET | Refills: 0 | Status: SHIPPED | OUTPATIENT
Start: 2024-03-23 | End: 2024-03-21 | Stop reason: SDUPTHER

## 2024-03-20 ENCOUNTER — TELEPHONE (OUTPATIENT)
Dept: FAMILY MEDICINE | Facility: CLINIC | Age: 64
End: 2024-03-20
Payer: MEDICARE

## 2024-03-20 DIAGNOSIS — E78.5 HYPERLIPIDEMIA, UNSPECIFIED HYPERLIPIDEMIA TYPE: ICD-10-CM

## 2024-03-20 RX ORDER — ATORVASTATIN CALCIUM 40 MG/1
40 TABLET, FILM COATED ORAL DAILY
Qty: 90 TABLET | Refills: 3 | Status: SHIPPED | OUTPATIENT
Start: 2024-03-20

## 2024-03-20 NOTE — TELEPHONE ENCOUNTER
Spoke to pt and he stated his knee has been swelling since last week. Would like seen pt scheduled

## 2024-03-20 NOTE — TELEPHONE ENCOUNTER
----- Message from Deepa Clark sent at 3/20/2024  8:52 AM CDT -----  Pt states last wednesday woke up with left knee swollen and been taking gabapentin. Pt states knee is still swollen with pain. Pt did not hit knee or fall, asked if he is able to receive a Cortizone in knee  [phone]

## 2024-03-20 NOTE — TELEPHONE ENCOUNTER
----- Message from Deepa Clark sent at 3/20/2024  9:46 AM CDT -----   Needs refill on lipitor   [facility] [place]   [phone]

## 2024-03-21 ENCOUNTER — OFFICE VISIT (OUTPATIENT)
Dept: FAMILY MEDICINE | Facility: CLINIC | Age: 64
End: 2024-03-21
Payer: MEDICARE

## 2024-03-21 VITALS
SYSTOLIC BLOOD PRESSURE: 116 MMHG | HEART RATE: 93 BPM | DIASTOLIC BLOOD PRESSURE: 78 MMHG | HEIGHT: 69 IN | BODY MASS INDEX: 35.1 KG/M2 | WEIGHT: 237 LBS

## 2024-03-21 DIAGNOSIS — N40.0 BPH WITHOUT OBSTRUCTION/LOWER URINARY TRACT SYMPTOMS: ICD-10-CM

## 2024-03-21 DIAGNOSIS — G80.9 INFANTILE CEREBRAL PALSY: ICD-10-CM

## 2024-03-21 DIAGNOSIS — F41.9 ANXIETY DISORDER, UNSPECIFIED TYPE: ICD-10-CM

## 2024-03-21 DIAGNOSIS — E78.2 MIXED HYPERLIPIDEMIA: ICD-10-CM

## 2024-03-21 DIAGNOSIS — K21.9 GASTROESOPHAGEAL REFLUX DISEASE, UNSPECIFIED WHETHER ESOPHAGITIS PRESENT: ICD-10-CM

## 2024-03-21 DIAGNOSIS — G89.4 CHRONIC PAIN SYNDROME: ICD-10-CM

## 2024-03-21 DIAGNOSIS — M17.12 PRIMARY OSTEOARTHRITIS OF LEFT KNEE: Primary | ICD-10-CM

## 2024-03-21 DIAGNOSIS — M51.9 LUMBAR DISC DISEASE: ICD-10-CM

## 2024-03-21 DIAGNOSIS — I10 ESSENTIAL HYPERTENSION: ICD-10-CM

## 2024-03-21 PROCEDURE — 20610 DRAIN/INJ JOINT/BURSA W/O US: CPT | Mod: LT,S$GLB,, | Performed by: FAMILY MEDICINE

## 2024-03-21 PROCEDURE — 99214 OFFICE O/P EST MOD 30 MIN: CPT | Mod: 25,S$GLB,, | Performed by: FAMILY MEDICINE

## 2024-03-21 RX ORDER — METHYLPREDNISOLONE 4 MG/1
TABLET ORAL
Qty: 21 EACH | Refills: 0 | Status: SHIPPED | OUTPATIENT
Start: 2024-03-21 | End: 2024-04-11

## 2024-03-21 RX ORDER — HYDROCODONE BITARTRATE AND ACETAMINOPHEN 10; 325 MG/1; MG/1
1 TABLET ORAL EVERY 6 HOURS PRN
Qty: 90 TABLET | Refills: 0 | Status: SHIPPED | OUTPATIENT
Start: 2024-03-23 | End: 2024-04-24 | Stop reason: SDUPTHER

## 2024-03-21 RX ORDER — PANTOPRAZOLE SODIUM 40 MG/1
40 TABLET, DELAYED RELEASE ORAL DAILY
Qty: 90 TABLET | Refills: 1 | Status: SHIPPED | OUTPATIENT
Start: 2024-03-21 | End: 2024-09-17

## 2024-03-21 RX ORDER — ALPRAZOLAM 1 MG/1
1 TABLET ORAL DAILY
Qty: 30 TABLET | Refills: 0 | Status: SHIPPED | OUTPATIENT
Start: 2024-03-21 | End: 2024-05-08 | Stop reason: SDUPTHER

## 2024-03-21 RX ORDER — AMLODIPINE BESYLATE 10 MG/1
10 TABLET ORAL DAILY
Qty: 90 TABLET | Refills: 3 | Status: SHIPPED | OUTPATIENT
Start: 2024-03-21

## 2024-03-21 RX ADMIN — TRIAMCINOLONE ACETONIDE 40 MG: 40 INJECTION, SUSPENSION INTRA-ARTICULAR; INTRAMUSCULAR at 07:03

## 2024-03-21 RX ADMIN — LIDOCAINE HYDROCHLORIDE 1 ML: 10 INJECTION, SOLUTION EPIDURAL; INFILTRATION; INTRACAUDAL; PERINEURAL at 07:03

## 2024-03-24 PROBLEM — M17.12 PRIMARY OSTEOARTHRITIS OF LEFT KNEE: Status: ACTIVE | Noted: 2024-03-24

## 2024-03-24 RX ORDER — TRIAMCINOLONE ACETONIDE 40 MG/ML
40 INJECTION, SUSPENSION INTRA-ARTICULAR; INTRAMUSCULAR
Status: DISCONTINUED | OUTPATIENT
Start: 2024-03-21 | End: 2024-03-24 | Stop reason: HOSPADM

## 2024-03-24 RX ORDER — LIDOCAINE HYDROCHLORIDE 10 MG/ML
1 INJECTION, SOLUTION EPIDURAL; INFILTRATION; INTRACAUDAL; PERINEURAL
Status: DISCONTINUED | OUTPATIENT
Start: 2024-03-21 | End: 2024-03-24 | Stop reason: HOSPADM

## 2024-03-24 NOTE — PROCEDURES
Large Joint Aspiration/Injection: L knee    Date/Time: 3/21/2024 7:40 AM    Performed by: Jonathan Chacon MD  Authorized by: Jonathan Chacon MD    Consent Done?:  Yes (Verbal)  Indications:  Arthritis  Site marked: the procedure site was marked    Timeout: prior to procedure the correct patient, procedure, and site was verified    Local anesthesia used?: No      Details:  Needle Size:  21 G  Approach:  Medial  Location:  Knee  Site:  L knee  Medications:  1 mL LIDOcaine (PF) 10 mg/ml (1%) 10 mg/mL (1 %); 40 mg triamcinolone acetonide 40 mg/mL  Patient tolerance:  Patient tolerated the procedure well with no immediate complications

## 2024-03-24 NOTE — PROGRESS NOTES
SUBJECTIVE:    Patient ID: Patrick Wilson is a 63 y.o. male.    Chief Complaint: Low-back Pain (Chronic back pain, knees swelling and pain, brought bottles, depression, need refills,  abc )    63-year-old male here for follow-up of his hypertension and hyperlipidemia.  He is now working 4 days a week at a local [employer].  He has been on his feet for up to 12 hours a day but he reduced his hours back to 4 hours a day due to knee pain and back pain.  His left knee began swelling up 1-1/2 weeks ago.  He is limping and has pain in his lower back as well.    Has seen Dr. Bhatt orthopedics for his spine.  Currently on hydrocodone 10/325 mg t.i.d.    He would like an injection of steroid in his knee for pain relief.    Low-back Pain  Associated symptoms include arthralgias (left knee pain and swelling).       Admission on 02/27/2024, Discharged on 02/27/2024   Component Date Value Ref Range Status    Color, POC UA 02/27/2024 Laila (A)  Yellow, Straw, Colorless Final    Clarity, POC UA 02/27/2024 Clear  Clear Final    Glucose, POC UA 02/27/2024 Negative  Negative Final    Bilirubin, POC UA 02/27/2024 Negative  Negative Final    Ketones, POC UA 02/27/2024 Trace (A)  Negative Final    Spec Grav POC UA 02/27/2024 1.025  1.005 - 1.030 Final    Blood, POC UA 02/27/2024 Negative  Negative Final    pH, POC UA 02/27/2024 6.0  5.0 - 8.0 Final    Protein, POC UA 02/27/2024 Negative  Negative Final    Urobilinogen, POC UA 02/27/2024 0.2  <=1.0 Final    Nitrite, POC UA 02/27/2024 Negative  Negative Final    WBC, POC UA 02/27/2024 Negative  Negative Final   Lab Visit on 01/03/2024   Component Date Value Ref Range Status    Specimen UA 01/03/2024 Urine, Clean Catch   Final    Color, UA 01/03/2024 Yellow  Yellow, Straw, Laila Final    Appearance, UA 01/03/2024 Clear  Clear Final    pH, UA 01/03/2024 7.0  5.0 - 8.0 Final    Specific Gravity, UA 01/03/2024 1.020  1.005 - 1.030 Final    Protein, UA 01/03/2024 Negative   Negative Final    Glucose, UA 01/03/2024 Negative  Negative Final    Ketones, UA 01/03/2024 Negative  Negative Final    Bilirubin (UA) 01/03/2024 Negative  Negative Final    Occult Blood UA 01/03/2024 Negative  Negative Final    Nitrite, UA 01/03/2024 Negative  Negative Final    Urobilinogen, UA 01/03/2024 Negative  <2.0 EU/dL Final    Leukocytes, UA 01/03/2024 Negative  Negative Final   Lab Visit on 01/03/2024   Component Date Value Ref Range Status    Urine Culture, Routine 01/03/2024 No growth   Final   Lab Visit on 11/17/2023   Component Date Value Ref Range Status    PSA Total 11/17/2023 1.7  0.00 - 4.00 ng/mL Final    PSA, Free 11/17/2023 0.47  0.00 - 1.50 ng/mL Final    PSA, Free % 11/17/2023 27.65  Not established % Final   Office Visit on 11/17/2023   Component Date Value Ref Range Status    POC Residual Urine Volume 11/17/2023 69  0 - 100 mL Final   Office Visit on 11/13/2023   Component Date Value Ref Range Status    Amphetamines 11/13/2023 NEGATIVE  <500 ng/mL Final    Barbiturates 11/13/2023 NEGATIVE  <300 ng/mL Final    Benzodiazepines 11/13/2023 POSITIVE (A)  <100 ng/mL Final    Alphahydroxyalprazolam 11/13/2023 137 (H)  <25 ng/mL Final    Alphahydroxymidazolam 11/13/2023 NEGATIVE  <50 ng/mL Final    Alphahydroxytriazolam 11/13/2023 NEGATIVE  <50 ng/mL Final    Aminoclonazepam 11/13/2023 44 (H)  <25 ng/mL Final    hydroxyethylflurazepam UR GC/MS 11/13/2023 NEGATIVE  <50 ng/mL Final    Lorazepam 11/13/2023 NEGATIVE  <50 ng/mL Final    Nordiazepam Lvl 11/13/2023 NEGATIVE  <50 ng/mL Final    Oxazepam 11/13/2023 NEGATIVE  <50 ng/mL Final    Temazepam GC/MS Conf 11/13/2023 NEGATIVE  <50 ng/mL Final    Benzodiazepines Comments 11/13/2023    Final    Cocaine Metabolites 11/13/2023 NEGATIVE  <150 ng/mL Final    Methadone 11/13/2023 NEGATIVE  <100 ng/mL Final    Opiates 11/13/2023 POSITIVE (A)  <100 ng/mL Final    Codeine 11/13/2023 NEGATIVE  <50 ng/mL Final    Hydrocodone 11/13/2023 665 (H)  <50 ng/mL Final     Hydromorphone 11/13/2023 131 (H)  <50 ng/mL Final    Morphine 11/13/2023 NEGATIVE  <50 ng/mL Final    NORHYDROCODONE 11/13/2023 606 (H)  <50 ng/mL Final    Opiates Comments 11/13/2023    Final    Oxycodone 11/13/2023 NEGATIVE  <100 ng/mL Final    Phencyclidine 11/13/2023 NEGATIVE  <25 ng/mL Final    Creatinine 11/13/2023 68.8  > or = 20.0 mg/dL Final    pH 11/13/2023 6.7  4.5 - 9.0 Final    Oxidants, Urine (Tox) 11/13/2023 NEGATIVE  <200 mcg/mL Final    Notes and Comments 11/13/2023    Final       Past Medical History:   Diagnosis Date    Anticoagulant long-term use     Anxiety     Arthritis     Back pain     Cerebral palsy     Depression     GERD (gastroesophageal reflux disease)     Hypertension     Peptic ulcer of stomach     Seizures     AS A CHILD    Wears glasses     CONTACS     Social History     Socioeconomic History    Marital status:    Tobacco Use    Smoking status: Some Days     Types: Pipe     Passive exposure: Current    Smokeless tobacco: Never    Tobacco comments:     occasional.   Substance and Sexual Activity    Alcohol use: Yes     Comment: occasional    Drug use: No    Sexual activity: Yes     Partners: Female     Past Surgical History:   Procedure Laterality Date    ANKLE SURGERY Left     BACK SURGERY      CYSTOSCOPY N/A 7/17/2018    Procedure: CYSTOSCOPY;  Surgeon: Jonathan Jacinto MD;  Location: [facility] OR;  Service: Urology;  Laterality: N/A;    CYSTOSCOPY N/A 4/6/2021    Procedure: CYSTOSCOPY;  Surgeon: Jonathan Jacinto MD;  Location: [facility] OR;  Service: Urology;  Laterality: N/A;    CYSTOSCOPY N/A 2/27/2024    Procedure: CYSTOSCOPY;  Surgeon: Jonathan Jacinto MD;  Location: [facility] OR;  Service: Urology;  Laterality: N/A;    CYSTOSCOPY WITH INSERTION OF MINIMALLY INVASIVE IMPLANT TO ENLARGE PROSTATIC URETHRA N/A 8/2/2018    Procedure: CYSTOSCOPY, WITH INSERTION OF UROLIFT IMPLANT;  Surgeon: Jonathan Jacinto MD;  Location: [facility] OR;  Service: Urology;  Laterality: N/A;    LEG  SURGERY Left     SHOULDER SURGERY Right     TRANSRECTAL ULTRASOUND EXAMINATION N/A 7/17/2018    Procedure: ULTRASOUND, TRANSRECTAL;  Surgeon: Jonathan Jacinto MD;  Location: [facility] OR;  Service: Urology;  Laterality: N/A;    TRANSRECTAL ULTRASOUND EXAMINATION N/A 2/27/2024    Procedure: ULTRASOUND, RECTAL APPROACH;  Surgeon: Jonathan Jacinto MD;  Location: [facility] OR;  Service: Urology;  Laterality: N/A;    TRANSURETHRAL RESECTION OF PROSTATE N/A 5/27/2021    Procedure: TURP (TRANSURETHRAL RESECTION OF PROSTATE);  Surgeon: Jonathan Jacinto MD;  Location: [facility] OR;  Service: Urology;  Laterality: N/A;     Family History   Problem Relation Age of Onset    Melanoma Father        The 10-year CVD risk score (Arleth, et al., 2008) is: 31.6%    Values used to calculate the score:      Age: 63 years      Sex: Male      Diabetic: No      Tobacco smoker: Yes      Systolic Blood Pressure: 116 mmHg      Is BP treated: Yes      HDL Cholesterol: 54 mg/dL      Total Cholesterol: 235 mg/dL    Tests to Keep You Healthy    Colon Cancer Screening: Met on 7/24/2023  Last Blood Pressure <= 139/89 (3/21/2024): Yes  Tobacco Cessation: NO      Review of patient's allergies indicates:   Allergen Reactions    Tylox [oxycodone-acetaminophen] Nausea And Vomiting       Current Outpatient Medications:     aspirin (ECOTRIN) 81 MG EC tablet, Take 81 mg by mouth once daily., Disp: , Rfl:     atorvastatin (LIPITOR) 40 MG tablet, Take 1 tablet (40 mg total) by mouth once daily., Disp: 90 tablet, Rfl: 3    augmented betamethasone dipropionate (DIPROLENE-AF) 0.05 % cream, Use on AA of hand twice daily., Disp: 50 g, Rfl: 1    cyclobenzaprine (FLEXERIL) 5 MG tablet, Take 1 tablet (5 mg total) by mouth 3 (three) times daily as needed., Disp: 90 tablet, Rfl: 2    D3/E/Se/soy isofl/tocoph/lycop (PROSTATE 2.4 ORAL), Take by mouth., Disp: , Rfl:     DULoxetine (CYMBALTA) 60 MG capsule, Take 1 capsule (60 mg total) by mouth once daily., Disp: 90  capsule, Rfl: 3    fluticasone propionate (FLONASE) 50 mcg/actuation nasal spray, 1 spray (50 mcg total) by Each Nostril route as needed., Disp: 16 g, Rfl: 3    gabapentin (NEURONTIN) 300 MG capsule, Take 3 capsules (900 mg total) by mouth 3 (three) times daily., Disp: 270 capsule, Rfl: 4    hydroCHLOROthiazide (HYDRODIURIL) 25 MG tablet, Take 1 tablet (25 mg total) by mouth once daily., Disp: 90 tablet, Rfl: 3    HYDROcodone-acetaminophen (NORCO)  mg per tablet, Take 1 tablet by mouth every 6 (six) hours as needed for Pain., Disp: 90 tablet, Rfl: 0    KRILL OIL ORAL, Take by mouth., Disp: , Rfl:     lisinopriL 10 MG tablet, Take 1 tablet (10 mg total) by mouth once daily., Disp: 90 tablet, Rfl: 1    losartan (COZAAR) 100 MG tablet, Take 1 tablet (100 mg total) by mouth once daily., Disp: 90 tablet, Rfl: 3    multivit-min/folic/vit K/lycop (MEN'S 50 PLUS MULTIVITAMIN ORAL), Take 1 tablet by mouth once daily., Disp: , Rfl:     solifenacin (VESICARE) 5 MG tablet, Take 1 tablet (5 mg total) by mouth once daily., Disp: 90 tablet, Rfl: 3    SUTAB 1.479-0.188- 0.225 gram tablet, Take by mouth., Disp: , Rfl:     triamcinolone acetonide 0.025% (KENALOG) 0.025 % cream, Apply topically 2 (two) times daily., Disp: 80 g, Rfl: 0    ALPRAZolam (XANAX) 1 MG tablet, Take 1 tablet (1 mg total) by mouth once daily., Disp: 30 tablet, Rfl: 0    amLODIPine (NORVASC) 10 MG tablet, Take 1 tablet (10 mg total) by mouth once daily., Disp: 90 tablet, Rfl: 3    cetirizine (ZYRTEC) 10 MG tablet, Take 1 tablet (10 mg total) by mouth once daily., Disp: 90 tablet, Rfl: 1    ciprofloxacin HCl (CIPRO) 500 MG tablet, Take 1 tablet (500 mg total) by mouth 2 (two) times a day. (Patient not taking: Reported on 3/21/2024), Disp: 4 tablet, Rfl: 0    clobetasoL (TEMOVATE) 0.05 % cream, Apply topically 2 (two) times daily. for 7 days, Disp: 45 g, Rfl: 0    HYDROcodone-acetaminophen (NORCO)  mg per tablet, Take 1 tablet by mouth every 6 (six)  "hours as needed for Pain., Disp: 90 tablet, Rfl: 0    HYDROcodone-acetaminophen (NORCO)  mg per tablet, Take 1 tablet by mouth every 6 (six) hours as needed for Pain., Disp: 90 tablet, Rfl: 0    HYDROcodone-acetaminophen (NORCO)  mg per tablet, Take 1 tablet by mouth every 6 (six) hours as needed for Pain., Disp: 90 tablet, Rfl: 0    HYDROcodone-acetaminophen (NORCO)  mg per tablet, Take 1 tablet by mouth every 6 (six) hours as needed for Pain., Disp: 90 tablet, Rfl: 0    HYDROcodone-acetaminophen (NORCO)  mg per tablet, Take 1 tablet by mouth every 6 (six) hours as needed for Pain., Disp: 90 tablet, Rfl: 0    methylPREDNISolone (MEDROL DOSEPACK) 4 mg tablet, use as directed, Disp: 21 each, Rfl: 0    pantoprazole (PROTONIX) 40 MG tablet, Take 1 tablet (40 mg total) by mouth once daily., Disp: 90 tablet, Rfl: 1  No current facility-administered medications for this visit.    Facility-Administered Medications Ordered in Other Visits:     ampicillin 2 g in sodium chloride 0.9 % 100 mL IVPB (ready to mix system), 2 g, Intravenous, On Call Procedure, Jonathan Jacinto MD, 2 g at 05/27/21 1234    Review of Systems   Musculoskeletal:  Positive for arthralgias (left knee pain and swelling).           Objective:      Vitals:    03/21/24 0800   BP: 116/78   Pulse: 93   Weight: 107.5 kg (237 lb)   Height: 5' 9" (1.753 m)     Physical Exam  Vitals and nursing note reviewed.   Constitutional:       Appearance: He is well-developed.   HENT:      Head: Normocephalic and atraumatic.      Right Ear: External ear normal.      Left Ear: External ear normal.      Nose: Nose normal.   Eyes:      Pupils: Pupils are equal, round, and reactive to light.   Neck:      Thyroid: No thyromegaly.      Vascular: No carotid bruit.   Cardiovascular:      Rate and Rhythm: Normal rate and regular rhythm.      Heart sounds: Normal heart sounds. No murmur heard.  Pulmonary:      Effort: Pulmonary effort is normal.      Breath " sounds: Normal breath sounds. No wheezing or rales.   Abdominal:      General: Bowel sounds are normal. There is no distension.      Palpations: Abdomen is soft.      Tenderness: There is no abdominal tenderness.   Musculoskeletal:         General: No tenderness or deformity. Normal range of motion.      Cervical back: Normal range of motion and neck supple.      Lumbar back: Normal. No spasms.      Comments: Bends 90 degrees at  waist /left knee has a moderate effusion.  Pain with flexion and extension very crepitant knee.   Lymphadenopathy:      Cervical: No cervical adenopathy.   Skin:     General: Skin is warm and dry.      Findings: No rash.   Neurological:      Mental Status: He is alert and oriented to person, place, and time.      Cranial Nerves: No cranial nerve deficit.      Coordination: Coordination normal.   Psychiatric:         Behavior: Behavior normal.         Thought Content: Thought content normal.         Judgment: Judgment normal.           Assessment:       1. Primary osteoarthritis of left knee    2. Essential hypertension    3. Anxiety disorder, unspecified type    4. Gastroesophageal reflux disease, unspecified whether esophagitis present    5. Chronic pain syndrome    6. Lumbar disc disease    7. Infantile cerebral palsy    8. Mixed hyperlipidemia    9. BPH without obstruction/lower urinary tract symptoms         Plan:       Primary osteoarthritis of left knee  -     methylPREDNISolone (MEDROL DOSEPACK) 4 mg tablet; use as directed  Dispense: 21 each; Refill: 0  Patient has a moderate effusion on the left with a flare-up of arthritis.  Attempted aspiration of the knee with this was unsuccessful, we did give an injection of lidocaine/triamcinolone, will follow with a Medrol Dosepak if no relief.  He may need further workup with x-rays and MRIs in the future  Essential hypertension  Comments:  Doing well. rechecked by me. refills today.  Orders:  -     amLODIPine (NORVASC) 10 MG tablet; Take 1  tablet (10 mg total) by mouth once daily.  Dispense: 90 tablet; Refill: 3    Anxiety disorder, unspecified type  Comments:  mood and anxiety appear to be stable at this time. continue as is.  Orders:  -     ALPRAZolam (XANAX) 1 MG tablet; Take 1 tablet (1 mg total) by mouth once daily.  Dispense: 30 tablet; Refill: 0    Gastroesophageal reflux disease, unspecified whether esophagitis present  -     pantoprazole (PROTONIX) 40 MG tablet; Take 1 tablet (40 mg total) by mouth once daily.  Dispense: 90 tablet; Refill: 1    Chronic pain syndrome  -     HYDROcodone-acetaminophen (NORCO)  mg per tablet; Take 1 tablet by mouth every 6 (six) hours as needed for Pain.  Dispense: 90 tablet; Refill: 0    Lumbar disc disease    Infantile cerebral palsy    Mixed hyperlipidemia    BPH without obstruction/lower urinary tract symptoms      Follow up in about 3 months (around 6/21/2024), or [facility] , 6 mo Dr MARSHALL        3/24/2024 Jonathan Chacon

## 2024-04-24 DIAGNOSIS — G89.4 CHRONIC PAIN SYNDROME: ICD-10-CM

## 2024-04-24 RX ORDER — HYDROCODONE BITARTRATE AND ACETAMINOPHEN 10; 325 MG/1; MG/1
1 TABLET ORAL EVERY 6 HOURS PRN
Qty: 90 TABLET | Refills: 0 | Status: SHIPPED | OUTPATIENT
Start: 2024-04-24 | End: 2024-05-22 | Stop reason: SDUPTHER

## 2024-04-29 ENCOUNTER — TELEPHONE (OUTPATIENT)
Dept: FAMILY MEDICINE | Facility: CLINIC | Age: 64
End: 2024-04-29
Payer: MEDICARE

## 2024-04-29 NOTE — TELEPHONE ENCOUNTER
Spoke with patient and received cortisone injection about a month ago and left knee pain has started to bother him again. Didn't take steroid pills yet that were prescribed. States pain is back to the where he started at. When first getting up left knee will bother patient and if turns wrong will be bothersome.   Noticed dry mouth about two weeks ago. Tries not to drink water past 7:30 pm per Dr. Jacinto but has too and will wake every morning with cotton mouth.  Wife and friends state patient has been forgetful and repeating himself, started about few months ago, all the time every day. Denies any injuries. Patient states he does have cerebral palsy since birth.    Started Vesicare in February, prescribed by Dr. Jacinto. Getting with provider for further review.

## 2024-04-29 NOTE — TELEPHONE ENCOUNTER
Patient states he is going to start the steroid prescription. Patient has been moved to Dr. Chacon's scheduled in June to further discuss injection.

## 2024-04-29 NOTE — TELEPHONE ENCOUNTER
Called pt to see what he needed to be seen for today, explained the dry mouth is most likely from the vesicare, if he thinks he's having side effects from that med, he needs to call Dr. Ku  Wants appt for Dr. Chacon to inject the knee

## 2024-04-29 NOTE — TELEPHONE ENCOUNTER
----- Message from Shakila Estrada sent at 4/29/2024  8:32 AM CDT -----  The patient saw Dr. Chacon about his knee a month and 1/2 ago. He got a Cortizone shot. It helped him for a while. Its started bothering him. Can he take the steroid pack you called in for him? He has been getting bad  dry mouth at night. He is not sure why.  He said some of his friends said he has been getting forgetful. He will keep repeating himself. He needs a call. He thinks he may be some of the medication  he on. He sees Dr. Jacinto. He gave him a new medication for his bladder after a procedure he had. Pt's # [phone] GH

## 2024-05-08 DIAGNOSIS — F41.9 ANXIETY DISORDER, UNSPECIFIED TYPE: ICD-10-CM

## 2024-05-08 RX ORDER — ALPRAZOLAM 1 MG/1
1 TABLET ORAL DAILY
Qty: 30 TABLET | Refills: 5 | Status: SHIPPED | OUTPATIENT
Start: 2024-05-08

## 2024-05-22 ENCOUNTER — TELEPHONE (OUTPATIENT)
Dept: FAMILY MEDICINE | Facility: CLINIC | Age: 64
End: 2024-05-22
Payer: MEDICARE

## 2024-05-22 DIAGNOSIS — G89.4 CHRONIC PAIN SYNDROME: ICD-10-CM

## 2024-05-22 RX ORDER — HYDROCODONE BITARTRATE AND ACETAMINOPHEN 10; 325 MG/1; MG/1
1 TABLET ORAL EVERY 6 HOURS PRN
Qty: 90 TABLET | Refills: 0 | Status: SHIPPED | OUTPATIENT
Start: 2024-05-23 | End: 2024-06-12 | Stop reason: SDUPTHER

## 2024-05-22 NOTE — TELEPHONE ENCOUNTER
----- Message from Phyllis Ballard sent at 5/22/2024  9:40 AM CDT -----  Pt is calling back from yesterday   [phone]

## 2024-05-22 NOTE — TELEPHONE ENCOUNTER
Last office visit on 3/21/24 and upcoming on 6/24/24. Per  last filled on 4/25/24.   Prescription pended for provider review.

## 2024-05-22 NOTE — TELEPHONE ENCOUNTER
Deepa Clark Staff  Caller: Aura (Today,  2:02 PM)  Wife Aura calling about pt appointment being rescheduled to the following due to going out of town on the 28th.  [phone]    Patient has been rescheduled.

## 2024-05-24 ENCOUNTER — TELEPHONE (OUTPATIENT)
Dept: FAMILY MEDICINE | Facility: CLINIC | Age: 64
End: 2024-05-24
Payer: MEDICARE

## 2024-05-24 NOTE — TELEPHONE ENCOUNTER
Spoke with patient's wife and informed that it is recommended seek injection at their local pharmacy. Waiting vaccine due to having a new grandchild. Patient's wife verbalized understanding.

## 2024-05-24 NOTE — TELEPHONE ENCOUNTER
----- Message from Grisel Ybarra MA sent at 5/24/2024 10:56 AM CDT -----    ----- Message -----  From: Alexus Whitt MA  Sent: 5/24/2024   9:22 AM CDT  To: Jonathan Chacon Staff    Pt wife Ms Chavarria is calling requesting getting  Tdap vaccine for her and    [phone]

## 2024-05-28 DIAGNOSIS — Z00.00 ENCOUNTER FOR MEDICARE ANNUAL WELLNESS EXAM: ICD-10-CM

## 2024-06-04 ENCOUNTER — TELEPHONE (OUTPATIENT)
Dept: FAMILY MEDICINE | Facility: CLINIC | Age: 64
End: 2024-06-04
Payer: MEDICARE

## 2024-06-04 DIAGNOSIS — E78.5 HYPERLIPIDEMIA, UNSPECIFIED HYPERLIPIDEMIA TYPE: ICD-10-CM

## 2024-06-04 DIAGNOSIS — I10 ESSENTIAL HYPERTENSION: ICD-10-CM

## 2024-06-04 DIAGNOSIS — Z79.899 ENCOUNTER FOR LONG-TERM (CURRENT) USE OF OTHER MEDICATIONS: Primary | ICD-10-CM

## 2024-06-06 DIAGNOSIS — G89.4 CHRONIC PAIN SYNDROME: ICD-10-CM

## 2024-06-06 RX ORDER — GABAPENTIN 300 MG/1
900 CAPSULE ORAL 3 TIMES DAILY
Qty: 270 CAPSULE | Refills: 4 | Status: SHIPPED | OUTPATIENT
Start: 2024-06-06 | End: 2024-12-03

## 2024-06-06 NOTE — TELEPHONE ENCOUNTER
----- Message from Grisel Ybarra MA sent at 6/6/2024 10:16 AM CDT -----    ----- Message -----  From: Shakila Estrada  Sent: 6/6/2024  10:05 AM CDT  To: Jonathan Chacon Staff    Refill Gabapentin. [facility] pt's # [phone] [facility]

## 2024-06-12 ENCOUNTER — OFFICE VISIT (OUTPATIENT)
Dept: FAMILY MEDICINE | Facility: CLINIC | Age: 64
End: 2024-06-12
Payer: MEDICARE

## 2024-06-12 VITALS
SYSTOLIC BLOOD PRESSURE: 138 MMHG | HEART RATE: 100 BPM | BODY MASS INDEX: 35.55 KG/M2 | DIASTOLIC BLOOD PRESSURE: 86 MMHG | HEIGHT: 69 IN | WEIGHT: 240 LBS
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
## 2024-06-12 DIAGNOSIS — K21.9 GASTROESOPHAGEAL REFLUX DISEASE WITHOUT ESOPHAGITIS: ICD-10-CM

## 2024-06-12 DIAGNOSIS — Z51.81 ENCOUNTER FOR THERAPEUTIC DRUG MONITORING: ICD-10-CM

## 2024-06-12 DIAGNOSIS — E78.2 MIXED HYPERLIPIDEMIA: ICD-10-CM

## 2024-06-12 DIAGNOSIS — M51.9 LUMBAR DISC DISEASE: ICD-10-CM

## 2024-06-12 DIAGNOSIS — E78.5 HYPERLIPIDEMIA, UNSPECIFIED HYPERLIPIDEMIA TYPE: ICD-10-CM

## 2024-06-12 DIAGNOSIS — M17.12 PRIMARY OSTEOARTHRITIS OF LEFT KNEE: ICD-10-CM

## 2024-06-12 DIAGNOSIS — N40.1 BPH WITH URINARY OBSTRUCTION: ICD-10-CM

## 2024-06-12 DIAGNOSIS — I10 ESSENTIAL HYPERTENSION: ICD-10-CM
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
## 2024-06-12 DIAGNOSIS — Z79.899 ENCOUNTER FOR LONG-TERM (CURRENT) USE OF OTHER MEDICATIONS: ICD-10-CM

## 2024-06-12 DIAGNOSIS — F41.9 ANXIETY DISORDER, UNSPECIFIED TYPE: ICD-10-CM

## 2024-06-12 DIAGNOSIS — G80.9 INFANTILE CEREBRAL PALSY: ICD-10-CM

## 2024-06-12 DIAGNOSIS — N13.8 BPH WITH URINARY OBSTRUCTION: ICD-10-CM

## 2024-06-12 DIAGNOSIS — G89.4 CHRONIC PAIN SYNDROME: ICD-10-CM

## 2024-06-12 PROBLEM — T46.4X5A ACE-INHIBITOR COUGH: Status: RESOLVED | Noted: 2023-11-15 | Resolved: 2024-06-12

## 2024-06-12 PROBLEM — R05.8 ACE-INHIBITOR COUGH: Status: RESOLVED | Noted: 2023-01-01 | Resolved: 2024-01-01

## 2024-06-12 PROCEDURE — 3075F SYST BP GE 130 - 139MM HG: CPT | Mod: CPTII,S$GLB,, | Performed by: FAMILY MEDICINE

## 2024-06-12 PROCEDURE — 3079F DIAST BP 80-89 MM HG: CPT | Mod: CPTII,S$GLB,, | Performed by: FAMILY MEDICINE

## 2024-06-12 PROCEDURE — 99214 OFFICE O/P EST MOD 30 MIN: CPT | Mod: S$GLB,,, | Performed by: FAMILY MEDICINE

## 2024-06-12 PROCEDURE — 4010F ACE/ARB THERAPY RXD/TAKEN: CPT | Mod: CPTII,S$GLB,, | Performed by: FAMILY MEDICINE

## 2024-06-12 PROCEDURE — 3008F BODY MASS INDEX DOCD: CPT | Mod: CPTII,S$GLB,, | Performed by: FAMILY MEDICINE

## 2024-06-12 PROCEDURE — 1159F MED LIST DOCD IN RCRD: CPT | Mod: CPTII,S$GLB,, | Performed by: FAMILY MEDICINE

## 2024-06-12 RX ORDER — HYDROCODONE BITARTRATE AND ACETAMINOPHEN 10; 325 MG/1; MG/1
1 TABLET ORAL EVERY 6 HOURS PRN
Qty: 90 TABLET | Refills: 0 | Status: SHIPPED | OUTPATIENT
Start: 2024-06-12

## 2024-06-12 NOTE — PROGRESS NOTES
SUBJECTIVE:    Patient ID: Patrick Wilson is a 64 y.o. male.    Chief Complaint: Joint Swelling (Multiple joints, left knee, bilateral shoulder pain,brought bottles, UDS ordered,hands and legs swelling, dry mouth at night, abc )    64-year-old male with cerebral palsy.  He tripped and fell in his boat last week while going for a boat ride.  He got a hematoma of the right lower leg but it is resolving gradually.  He is active in mows the lawn with a push mower at home.  He does not take aspirin.    Has chronic lumbar disc disease and low back pains, intermittent right-sided sciatica.  He had some 2 day history of joint swelling of the right hand ,no trauma, and has subsided spontaneously, no history of gout, currently on Norco 10/325 mg t.i.d. and gabapentin 300 mg b.i.d. for chronic back pain symptoms.    History of dry mouth from taking VESIcare 5 mg daily    Left knee has medial joint pain, cortisone shot six-months ago seems to help.        Admission on 02/27/2024, Discharged on 02/27/2024   Component Date Value Ref Range Status    Color, POC UA 02/27/2024 Laila (A)  Yellow, Straw, Colorless Final    Clarity, POC UA 02/27/2024 Clear  Clear Final    Glucose, POC UA 02/27/2024 Negative  Negative Final    Bilirubin, POC UA 02/27/2024 Negative  Negative Final    Ketones, POC UA 02/27/2024 Trace (A)  Negative Final    Spec Grav POC UA 02/27/2024 1.025  1.005 - 1.030 Final    Blood, POC UA 02/27/2024 Negative  Negative Final    pH, POC UA 02/27/2024 6.0  5.0 - 8.0 Final    Protein, POC UA 02/27/2024 Negative  Negative Final    Urobilinogen, POC UA 02/27/2024 0.2  <=1.0 Final    Nitrite, POC UA 02/27/2024 Negative  Negative Final    WBC, POC UA 02/27/2024 Negative  Negative Final   Lab Visit on 01/03/2024   Component Date Value Ref Range Status    Specimen UA 01/03/2024 Urine, Clean Catch   Final    Color, UA 01/03/2024 Yellow  Yellow, Straw, Laila Final    Appearance, UA 01/03/2024 Clear  Clear Final    pH,  UA 01/03/2024 7.0  5.0 - 8.0 Final    Specific Gravity, UA 01/03/2024 1.020  1.005 - 1.030 Final    Protein, UA 01/03/2024 Negative  Negative Final    Glucose, UA 01/03/2024 Negative  Negative Final    Ketones, UA 01/03/2024 Negative  Negative Final    Bilirubin (UA) 01/03/2024 Negative  Negative Final    Occult Blood UA 01/03/2024 Negative  Negative Final    Nitrite, UA 01/03/2024 Negative  Negative Final    Urobilinogen, UA 01/03/2024 Negative  <2.0 EU/dL Final    Leukocytes, UA 01/03/2024 Negative  Negative Final   Lab Visit on 01/03/2024   Component Date Value Ref Range Status    Urine Culture, Routine 01/03/2024 No growth   Final       Past Medical History:   Diagnosis Date    ACE-inhibitor cough 11/15/2023    Anticoagulant long-term use     Anxiety     Arthritis     Back pain     Cerebral palsy     Depression     GERD (gastroesophageal reflux disease)     Hypertension     Peptic ulcer of stomach     Seizures     AS A CHILD    Wears glasses     CONTACS     Social History     Socioeconomic History    Marital status:    Tobacco Use    Smoking status: Some Days     Types: Pipe     Passive exposure: Current    Smokeless tobacco: Never    Tobacco comments:     occasional.   Substance and Sexual Activity    Alcohol use: Yes     Comment: occasional    Drug use: No    Sexual activity: Yes     Partners: Female     Past Surgical History:   Procedure Laterality Date    ANKLE SURGERY Left     BACK SURGERY      CYSTOSCOPY N/A 7/17/2018    Procedure: CYSTOSCOPY;  Surgeon: Jonathan Jacinto MD;  Location: [facility] OR;  Service: Urology;  Laterality: N/A;    CYSTOSCOPY N/A 4/6/2021    Procedure: CYSTOSCOPY;  Surgeon: Jonathan Jacinto MD;  Location: [facility] OR;  Service: Urology;  Laterality: N/A;    CYSTOSCOPY N/A 2/27/2024    Procedure: CYSTOSCOPY;  Surgeon: Jonathan Jacinto MD;  Location: [facility] OR;  Service: Urology;  Laterality: N/A;    CYSTOSCOPY WITH INSERTION OF MINIMALLY INVASIVE IMPLANT TO ENLARGE PROSTATIC  URETHRA N/A 8/2/2018    Procedure: CYSTOSCOPY, WITH INSERTION OF UROLIFT IMPLANT;  Surgeon: Jonathan Jacinto MD;  Location: [facility] OR;  Service: Urology;  Laterality: N/A;    LEG SURGERY Left     SHOULDER SURGERY Right     TRANSRECTAL ULTRASOUND EXAMINATION N/A 7/17/2018    Procedure: ULTRASOUND, TRANSRECTAL;  Surgeon: Jonathan Jacinto MD;  Location: [facility] OR;  Service: Urology;  Laterality: N/A;    TRANSRECTAL ULTRASOUND EXAMINATION N/A 2/27/2024    Procedure: ULTRASOUND, RECTAL APPROACH;  Surgeon: Jonathan Jacinto MD;  Location: [facility];  Service: Urology;  Laterality: N/A;    TRANSURETHRAL RESECTION OF PROSTATE N/A 5/27/2021    Procedure: TURP (TRANSURETHRAL RESECTION OF PROSTATE);  Surgeon: Jonathan Jacinto MD;  Location: [facility] OR;  Service: Urology;  Laterality: N/A;     Family History   Problem Relation Name Age of Onset    Melanoma Father         The 10-year CVD risk score (Landonino, et al., 2008) is: 43.1%    Values used to calculate the score:      Age: 64 years      Sex: Male      Diabetic: No      Tobacco smoker: Yes      Systolic Blood Pressure: 138 mmHg      Is BP treated: Yes      HDL Cholesterol: 54 mg/dL      Total Cholesterol: 235 mg/dL    Tests to Keep You Healthy    Colon Cancer Screening: Met on 7/24/2023  Last Blood Pressure <= 139/89 (6/12/2024): Yes  Tobacco Cessation: NO      Review of patient's allergies indicates:   Allergen Reactions    Tylox [oxycodone-acetaminophen] Nausea And Vomiting       Current Outpatient Medications:     ALPRAZolam (XANAX) 1 MG tablet, Take 1 tablet (1 mg total) by mouth once daily., Disp: 30 tablet, Rfl: 5    amLODIPine (NORVASC) 10 MG tablet, Take 1 tablet (10 mg total) by mouth once daily., Disp: 90 tablet, Rfl: 3    aspirin (ECOTRIN) 81 MG EC tablet, Take 81 mg by mouth once daily., Disp: , Rfl:     atorvastatin (LIPITOR) 40 MG tablet, Take 1 tablet (40 mg total) by mouth once daily., Disp: 90 tablet, Rfl: 3    augmented betamethasone dipropionate  (DIPROLENE-AF) 0.05 % cream, Use on AA of hand twice daily., Disp: 50 g, Rfl: 1    cyclobenzaprine (FLEXERIL) 5 MG tablet, Take 1 tablet (5 mg total) by mouth 3 (three) times daily as needed., Disp: 90 tablet, Rfl: 2    D3/E/Se/soy isofl/tocoph/lycop (PROSTATE 2.4 ORAL), Take by mouth., Disp: , Rfl:     DULoxetine (CYMBALTA) 60 MG capsule, Take 1 capsule (60 mg total) by mouth once daily., Disp: 90 capsule, Rfl: 3    fluticasone propionate (FLONASE) 50 mcg/actuation nasal spray, 1 spray (50 mcg total) by Each Nostril route as needed., Disp: 16 g, Rfl: 3    gabapentin (NEURONTIN) 300 MG capsule, Take 3 capsules (900 mg total) by mouth 3 (three) times daily., Disp: 270 capsule, Rfl: 4    hydroCHLOROthiazide (HYDRODIURIL) 25 MG tablet, Take 1 tablet (25 mg total) by mouth once daily., Disp: 90 tablet, Rfl: 3    HYDROcodone-acetaminophen (NORCO)  mg per tablet, Take 1 tablet by mouth every 6 (six) hours as needed for Pain., Disp: 90 tablet, Rfl: 0    KRILL OIL ORAL, Take by mouth., Disp: , Rfl:     lisinopriL 10 MG tablet, Take 1 tablet (10 mg total) by mouth once daily., Disp: 90 tablet, Rfl: 1    losartan (COZAAR) 100 MG tablet, Take 1 tablet (100 mg total) by mouth once daily., Disp: 90 tablet, Rfl: 3    multivit-min/folic/vit K/lycop (MEN'S 50 PLUS MULTIVITAMIN ORAL), Take 1 tablet by mouth once daily., Disp: , Rfl:     pantoprazole (PROTONIX) 40 MG tablet, Take 1 tablet (40 mg total) by mouth once daily., Disp: 90 tablet, Rfl: 1    solifenacin (VESICARE) 5 MG tablet, Take 1 tablet (5 mg total) by mouth once daily., Disp: 90 tablet, Rfl: 3    SUTAB 1.479-0.188- 0.225 gram tablet, Take by mouth., Disp: , Rfl:     triamcinolone acetonide 0.025% (KENALOG) 0.025 % cream, Apply topically 2 (two) times daily., Disp: 80 g, Rfl: 0    cetirizine (ZYRTEC) 10 MG tablet, Take 1 tablet (10 mg total) by mouth once daily., Disp: 90 tablet, Rfl: 1    ciprofloxacin HCl (CIPRO) 500 MG tablet, Take 1 tablet (500 mg total) by  mouth 2 (two) times a day. (Patient not taking: Reported on 3/21/2024), Disp: 4 tablet, Rfl: 0    clobetasoL (TEMOVATE) 0.05 % cream, Apply topically 2 (two) times daily. for 7 days, Disp: 45 g, Rfl: 0    HYDROcodone-acetaminophen (NORCO)  mg per tablet, Take 1 tablet by mouth every 6 (six) hours as needed for Pain. (Patient not taking: Reported on 6/12/2024), Disp: 90 tablet, Rfl: 0    HYDROcodone-acetaminophen (NORCO)  mg per tablet, Take 1 tablet by mouth every 6 (six) hours as needed for Pain. (Patient not taking: Reported on 6/12/2024), Disp: 90 tablet, Rfl: 0    HYDROcodone-acetaminophen (NORCO)  mg per tablet, Take 1 tablet by mouth every 6 (six) hours as needed for Pain. (Patient not taking: Reported on 6/12/2024), Disp: 90 tablet, Rfl: 0    HYDROcodone-acetaminophen (NORCO)  mg per tablet, Take 1 tablet by mouth every 6 (six) hours as needed for Pain. (Patient not taking: Reported on 6/12/2024), Disp: 90 tablet, Rfl: 0    HYDROcodone-acetaminophen (NORCO)  mg per tablet, Take 1 tablet by mouth every 6 (six) hours as needed for Pain., Disp: 90 tablet, Rfl: 0  No current facility-administered medications for this visit.    Facility-Administered Medications Ordered in Other Visits:     ampicillin 2 g in sodium chloride 0.9 % 100 mL IVPB (ready to mix system), 2 g, Intravenous, On Call Procedure, Jonathan Jacinto MD, 2 g at 05/27/21 1234    Review of Systems   Constitutional:  Negative for appetite change, chills, fatigue, fever and unexpected weight change.   HENT:  Negative for ear pain and trouble swallowing.    Eyes:  Negative for pain, discharge and visual disturbance.   Respiratory:  Negative for apnea, cough, shortness of breath and wheezing.    Cardiovascular:  Negative for chest pain and leg swelling.   Gastrointestinal:  Negative for abdominal pain, blood in stool, constipation, diarrhea, nausea, vomiting and reflux.   Endocrine: Negative for cold intolerance, heat  "intolerance and polydipsia.   Genitourinary:  Negative for bladder incontinence, dysuria, erectile dysfunction, frequency, hematuria, testicular pain and urgency.   Musculoskeletal:  Positive for arthralgias, back pain and gait problem. Negative for joint swelling and myalgias.   Neurological:  Positive for weakness. Negative for dizziness, seizures and numbness.   Psychiatric/Behavioral:  Negative for agitation, behavioral problems and hallucinations. The patient is not nervous/anxious.            Objective:      Vitals:    06/12/24 1010   BP: 138/86   Pulse: 100   Weight: 108.9 kg (240 lb)   Height: 5' 9" (1.753 m)     Physical Exam  Vitals and nursing note reviewed.   Constitutional:       General: He is not in acute distress.     Appearance: He is well-developed. He is obese. He is not toxic-appearing.   HENT:      Head: Normocephalic and atraumatic.      Right Ear: Tympanic membrane and external ear normal.      Left Ear: Tympanic membrane and external ear normal.      Nose: Nose normal.      Mouth/Throat:      Pharynx: Oropharynx is clear.   Eyes:      Pupils: Pupils are equal, round, and reactive to light.   Neck:      Thyroid: No thyromegaly.      Vascular: No carotid bruit.   Cardiovascular:      Rate and Rhythm: Normal rate and regular rhythm.      Heart sounds: Normal heart sounds. No murmur heard.  Pulmonary:      Effort: Pulmonary effort is normal.      Breath sounds: Normal breath sounds. No wheezing or rales.   Abdominal:      General: Bowel sounds are normal. There is no distension.      Palpations: Abdomen is soft.      Tenderness: There is no abdominal tenderness.   Musculoskeletal:         General: No tenderness or deformity. Normal range of motion.      Cervical back: Normal range of motion and neck supple.      Lumbar back: Normal. No spasms.      Comments: Bends 90 degrees at  waist no joint swellings to the hands today, able to close in a fist, knees are crepitant left greater than right. "   Lymphadenopathy:      Cervical: No cervical adenopathy.   Skin:     General: Skin is warm and dry.      Findings: No rash.   Neurological:      Mental Status: He is alert and oriented to person, place, and time. Mental status is at baseline.      Cranial Nerves: No cranial nerve deficit.      Motor: Weakness (Left hemiparesis due to cerebral palsy from birth) present.      Coordination: Coordination normal.      Gait: Gait abnormal.   Psychiatric:         Mood and Affect: Mood normal.         Behavior: Behavior normal.         Thought Content: Thought content normal.         Judgment: Judgment normal.      Comments: Easily distractible, has trouble reading and retaining information, may have had ADD since childhood.           Assessment:       1. Fall on ladder in water transport injuring occupant of small powered boat, initial encounter    2. Chronic pain syndrome    3. Encounter for long-term (current) use of other medications    4. Encounter for therapeutic drug monitoring    5. Hyperlipidemia, unspecified hyperlipidemia type    6. Lumbar disc disease    7. Infantile cerebral palsy    8. Anxiety disorder, unspecified type    9. Mixed hyperlipidemia    10. Essential hypertension    11. BPH with urinary obstruction    12. Gastroesophageal reflux disease without esophagitis    13. Primary osteoarthritis of left knee         Plan:       Fall on ladder in water transport injuring occupant of small powered boat, initial encounter  Patient has a small hematoma in the right lower leg, it is resolving nicely.  Chronic pain syndrome  -     HYDROcodone-acetaminophen (NORCO)  mg per tablet; Take 1 tablet by mouth every 6 (six) hours as needed for Pain.  Dispense: 90 tablet; Refill: 0  -     DRUG MONITOR, PANEL 4, W/CONF, URINE; Future; Expected date: 06/12/2024  Refill hydrocodone for chronic pain management  Encounter for long-term (current) use of other medications  -     DRUG MONITOR, PANEL 4, W/CONF, URINE;  Future; Expected date: 06/12/2024  Drug screen ordered  Encounter for therapeutic drug monitoring  -     DRUG MONITOR, PANEL 4, W/CONF, URINE; Future; Expected date: 06/12/2024    Hyperlipidemia, unspecified hyperlipidemia type  -     Comprehensive Metabolic Panel; Future; Expected date: 06/12/2024  -     Lipid Panel; Future; Expected date: 06/12/2024  -     Uric Acid; Future; Expected date: 06/12/2024  Lipid panel due now  Lumbar disc disease    Infantile cerebral palsy    Anxiety disorder, unspecified type    Mixed hyperlipidemia    Essential hypertension  Blood pressure well controlled  BPH with urinary obstruction  Continue VESIcare 5 mg, cautioned him that dry mouth is a common side effect of the medications  Gastroesophageal reflux disease without esophagitis  Continue PPI  Primary osteoarthritis of left knee  May need another cortisone injection if not improving, advised patient to get uric acid test done to rule out gout in his extremities    Follow up in about 3 months (around 9/12/2024), or Dr LIDYA Keen CP 6 mo.        6/12/2024 Jonathan Chacon

## 2024-06-15 LAB
1OH-MIDAZOLAM UR-MCNC: NEGATIVE NG/ML
7AMINOCLONAZEPAM UR-MCNC: NEGATIVE NG/ML
A-OH ALPRAZ UR-MCNC: 71 NG/ML
A-OH-TRIAZOLAM UR-MCNC: NEGATIVE NG/ML
ALBUMIN SERPL-MCNC: 4.5 G/DL (ref 3.6–5.1)
ALBUMIN/CREAT UR: 5 MG/G CREAT
ALBUMIN/GLOB SERPL: 1.7 (CALC) (ref 1–2.5)
ALP SERPL-CCNC: 131 U/L (ref 35–144)
ALT SERPL-CCNC: 44 U/L (ref 9–46)
AMPHETAMINES UR QL: NEGATIVE NG/ML
AST SERPL-CCNC: 20 U/L (ref 10–35)
BARBITURATES UR QL: NEGATIVE NG/ML
BENZODIAZ UR QL: POSITIVE NG/ML
BILIRUB SERPL-MCNC: 0.6 MG/DL (ref 0.2–1.2)
BUN SERPL-MCNC: 16 MG/DL (ref 7–25)
BUN/CREAT SERPL: ABNORMAL (CALC) (ref 6–22)
BZE UR QL: NEGATIVE NG/ML
CALCIUM SERPL-MCNC: 9.5 MG/DL (ref 8.6–10.3)
CHLORIDE SERPL-SCNC: 97 MMOL/L (ref 98–110)
CHOLEST SERPL-MCNC: 195 MG/DL
CHOLEST/HDLC SERPL: 4 (CALC)
CO2 SERPL-SCNC: 26 MMOL/L (ref 20–32)
CODEINE UR-MCNC: NEGATIVE NG/ML
CREAT SERPL-MCNC: 1.01 MG/DL (ref 0.7–1.35)
CREAT UR-MCNC: 103 MG/DL
CREAT UR-MCNC: 103 MG/DL (ref 20–320)
DRUG SCREEN COMMENT UR-IMP: ABNORMAL
DRUG SCREEN COMMENT UR-IMP: ABNORMAL
EGFR: 84 ML/MIN/1.73M2
GLOBULIN SER CALC-MCNC: 2.6 G/DL (CALC) (ref 1.9–3.7)
GLUCOSE SERPL-MCNC: 109 MG/DL (ref 65–139)
HDLC SERPL-MCNC: 49 MG/DL
HYDROCODONE UR-MCNC: 1070 NG/ML
HYDROMORPHONE UR-MCNC: 256 NG/ML
LDLC SERPL CALC-MCNC: 121 MG/DL (CALC)
LORAZEPAM UR-MCNC: NEGATIVE NG/ML
METHADONE UR QL: NEGATIVE NG/ML
MICROALBUMIN UR-MCNC: 0.5 MG/DL
MORPHINE UR-MCNC: NEGATIVE NG/ML
NONHDLC SERPL-MCNC: 146 MG/DL (CALC)
NORDIAZEPAM UR-MCNC: NEGATIVE NG/ML
NORHYDROCODONE UR CFM-MCNC: 1066 NG/ML
NOTES AND COMMENTS: ABNORMAL
OH-ETHYLFLURAZ UR-MCNC: NEGATIVE NG/ML
OPIATES UR QL: POSITIVE NG/ML
OXAZEPAM UR-MCNC: NEGATIVE NG/ML
OXIDANTS UR QL: NEGATIVE MCG/ML
OXYCODONE UR QL: NEGATIVE NG/ML
PCP UR QL: NEGATIVE NG/ML
PH UR: 6.1 [PH] (ref 4.5–9)
POTASSIUM SERPL-SCNC: 3.9 MMOL/L (ref 3.5–5.3)
PROT SERPL-MCNC: 7.1 G/DL (ref 6.1–8.1)
SODIUM SERPL-SCNC: 137 MMOL/L (ref 135–146)
TEMAZEPAM UR-MCNC: NEGATIVE NG/ML
TRIGL SERPL-MCNC: 141 MG/DL
URATE SERPL-MCNC: 7.7 MG/DL (ref 4–8)

## 2024-06-17 ENCOUNTER — TELEPHONE (OUTPATIENT)
Dept: FAMILY MEDICINE | Facility: CLINIC | Age: 64
End: 2024-06-17
Payer: MEDICARE

## 2024-06-17 NOTE — TELEPHONE ENCOUNTER
----- Message from Jonathan Chacon MD sent at 6/17/2024  9:44 AM CDT -----  Call patient.  Your urine drug screen shows you are  compliant in taking medication that was prescribed to you.  No problems found.  Continue current medication.  Your  sugar , kidneys , liver were all normal. ,chol excellent  at  195 , cont  current meds.

## 2024-06-17 NOTE — PROGRESS NOTES
Call patient.  Your urine drug screen shows you are  compliant in taking medication that was prescribed to you.  No problems found.  Continue current medication.  Your  sugar , kidneys , liver were all normal. ,chol excellent  at  195 , cont  current meds.

## 2024-06-26 ENCOUNTER — TELEPHONE (OUTPATIENT)
Dept: FAMILY MEDICINE | Facility: CLINIC | Age: 64
End: 2024-06-26
Payer: MEDICARE

## 2024-06-26 NOTE — TELEPHONE ENCOUNTER
----- Message from Shakila Estrada sent at 6/26/2024  9:10 AM CDT -----  Aura called the patients wife .The patient passed away yesterday. She is waiting on the Autopsy.  Aura # [phone]  [facility]

## (undated) DEVICE — TRAY DRY SPONGE SCRUB W FOAM

## (undated) DEVICE — Device

## (undated) DEVICE — DRESSING GAUZE CISION 1X8IN

## (undated) DEVICE — SLEEVE SCD EXPRESS KNEE MEDIUM

## (undated) DEVICE — GLOVE PROTEXIS PI SYN SURG 7

## (undated) DEVICE — SOL PVP-I SCRUB 7.5% 4OZ

## (undated) DEVICE — TUBING ARTHRO IRR 4-LEAD

## (undated) DEVICE — SOL IRR NACL .9% 3000ML

## (undated) DEVICE — CONTAINER SPECIMEN STRL 4OZ

## (undated) DEVICE — ELECTRODE RESECTION BUTTON LRG

## (undated) DEVICE — SHEET DRAPE MEDIUM

## (undated) DEVICE — SOL 9P NACL IRR PIC IL

## (undated) DEVICE — GLOVE SENSICARE PI ALOE 7

## (undated) DEVICE — SET IRR URLGY 2LINE UNIV SPIKE

## (undated) DEVICE — GAUZE SPONGE BULKEE 6X6.75IN

## (undated) DEVICE — SET CYSTO IRRIGATION UNIV SPIK

## (undated) DEVICE — GUIDE BIOPSY BIPLANAR 18G

## (undated) DEVICE — ELECTRODE RESECTION LOOP LRGE

## (undated) DEVICE — DRAPE UINDERBUT GRAD PCH

## (undated) DEVICE — JELLY LUBRICATING STERILE 5 GR

## (undated) DEVICE — SEE MEDLINE ITEM 146313

## (undated) DEVICE — WATER STERILE INJ 500ML BAG

## (undated) DEVICE — SPONGE GAUZE 16PLY 4X4

## (undated) DEVICE — SYR 50ML CATH TIP

## (undated) DEVICE — SYR 10CC LUER LOCK

## (undated) DEVICE — DRAPE MINOR PROCEDURE

## (undated) DEVICE — SEE MEDLINE ITEM 157116

## (undated) DEVICE — SEE L#95700

## (undated) DEVICE — BAG LINGEMAN DRAIN UROLOGY

## (undated) DEVICE — GLOVE SURG ULTRA TOUCH 7

## (undated) DEVICE — SEE MEDLINE ITEM 152651

## (undated) DEVICE — GOWN X-LG STERILE BACK

## (undated) DEVICE — SCRUB 10% POVIDONE IODINE 4OZ

## (undated) DEVICE — SEE MEDLINE ITEM 157185

## (undated) DEVICE — MANIFOLD 4 PORT

## (undated) DEVICE — JELLY LUBRICANT STERILE 4 OZ

## (undated) DEVICE — SEE MEDLINE ITEM 157117

## (undated) DEVICE — LUBRICANT SURGILUBE 2 OZ

## (undated) DEVICE — SET CYSTO IRR DRP CHMBR 84IN

## (undated) DEVICE — SEE L#120831

## (undated) DEVICE — COVER TRANSDUCER LATEX N/STERI

## (undated) DEVICE — SPONGE SUPER KERLIX 6X6.75IN

## (undated) DEVICE — BAG URINARY DRN 2000ML